# Patient Record
Sex: FEMALE | Race: WHITE | NOT HISPANIC OR LATINO | Employment: UNEMPLOYED | URBAN - METROPOLITAN AREA
[De-identification: names, ages, dates, MRNs, and addresses within clinical notes are randomized per-mention and may not be internally consistent; named-entity substitution may affect disease eponyms.]

---

## 2019-10-01 RX ORDER — LORATADINE 10 MG/1
TABLET ORAL
Qty: 30 TABLET | Refills: 0 | Status: SHIPPED | OUTPATIENT
Start: 2019-10-01 | End: 2019-12-30

## 2019-10-23 RX ORDER — OMEPRAZOLE 40 MG/1
CAPSULE, DELAYED RELEASE ORAL
Qty: 90 CAPSULE | Refills: 3 | Status: SHIPPED | OUTPATIENT
Start: 2019-10-23 | End: 2019-10-25

## 2019-10-24 ENCOUNTER — TELEPHONE (OUTPATIENT)
Dept: FAMILY MEDICINE CLINIC | Facility: CLINIC | Age: 54
End: 2019-10-24

## 2019-10-25 ENCOUNTER — OFFICE VISIT (OUTPATIENT)
Dept: FAMILY MEDICINE CLINIC | Facility: CLINIC | Age: 54
End: 2019-10-25

## 2019-10-25 VITALS
DIASTOLIC BLOOD PRESSURE: 68 MMHG | OXYGEN SATURATION: 100 % | HEIGHT: 66 IN | HEART RATE: 111 BPM | SYSTOLIC BLOOD PRESSURE: 126 MMHG | BODY MASS INDEX: 47.09 KG/M2 | TEMPERATURE: 97.8 F | WEIGHT: 293 LBS

## 2019-10-25 DIAGNOSIS — F43.23 ADJUSTMENT DISORDER WITH MIXED ANXIETY AND DEPRESSED MOOD: ICD-10-CM

## 2019-10-25 DIAGNOSIS — E11.9 TYPE 2 DIABETES MELLITUS WITHOUT COMPLICATION, WITHOUT LONG-TERM CURRENT USE OF INSULIN (HCC): ICD-10-CM

## 2019-10-25 DIAGNOSIS — Z23 NEEDS FLU SHOT: ICD-10-CM

## 2019-10-25 DIAGNOSIS — Z79.899 ENCOUNTER FOR LONG-TERM (CURRENT) USE OF MEDICATIONS: ICD-10-CM

## 2019-10-25 DIAGNOSIS — E78.2 MIXED HYPERLIPIDEMIA: ICD-10-CM

## 2019-10-25 DIAGNOSIS — K21.9 GASTROESOPHAGEAL REFLUX DISEASE WITHOUT ESOPHAGITIS: Primary | ICD-10-CM

## 2019-10-25 DIAGNOSIS — E66.01 CLASS 3 SEVERE OBESITY WITH SERIOUS COMORBIDITY AND BODY MASS INDEX (BMI) OF 45.0 TO 49.9 IN ADULT, UNSPECIFIED OBESITY TYPE (HCC): ICD-10-CM

## 2019-10-25 DIAGNOSIS — I10 ESSENTIAL HYPERTENSION: ICD-10-CM

## 2019-10-25 DIAGNOSIS — R01.1 MURMUR, CARDIAC: ICD-10-CM

## 2019-10-25 PROBLEM — E78.5 HYPERLIPIDEMIA: Status: ACTIVE | Noted: 2019-10-25

## 2019-10-25 PROCEDURE — 99214 OFFICE O/P EST MOD 30 MIN: CPT | Performed by: FAMILY MEDICINE

## 2019-10-25 PROCEDURE — 90686 IIV4 VACC NO PRSV 0.5 ML IM: CPT | Performed by: FAMILY MEDICINE

## 2019-10-25 PROCEDURE — 90471 IMMUNIZATION ADMIN: CPT | Performed by: FAMILY MEDICINE

## 2019-10-25 RX ORDER — BLOOD-GLUCOSE METER
1 KIT MISCELLANEOUS DAILY
Refills: 6 | COMMUNITY
Start: 2019-10-01 | End: 2020-06-08

## 2019-10-25 RX ORDER — METFORMIN HYDROCHLORIDE 500 MG/1
500 TABLET, EXTENDED RELEASE ORAL
Qty: 90 TABLET | Refills: 1 | Status: SHIPPED | OUTPATIENT
Start: 2019-10-25 | End: 2020-05-12

## 2019-10-25 RX ORDER — DOCUSATE SODIUM 100 MG/1
CAPSULE, LIQUID FILLED ORAL
Refills: 7 | COMMUNITY
Start: 2019-09-20 | End: 2020-01-28 | Stop reason: SDUPTHER

## 2019-10-25 RX ORDER — ATORVASTATIN CALCIUM 10 MG/1
10 TABLET, FILM COATED ORAL NIGHTLY
Qty: 90 TABLET | Refills: 1 | Status: SHIPPED | OUTPATIENT
Start: 2019-10-25 | End: 2020-05-12

## 2019-10-25 RX ORDER — ATORVASTATIN CALCIUM 10 MG/1
10 TABLET, FILM COATED ORAL NIGHTLY
Refills: 1 | COMMUNITY
Start: 2019-10-01 | End: 2019-10-25 | Stop reason: SDUPTHER

## 2019-10-25 RX ORDER — FERROUS SULFATE 325(65) MG
TABLET ORAL
Refills: 10 | COMMUNITY
Start: 2019-09-20 | End: 2019-10-29 | Stop reason: SDUPTHER

## 2019-10-25 RX ORDER — LISINOPRIL 40 MG/1
40 TABLET ORAL DAILY
Refills: 3 | COMMUNITY
Start: 2019-10-01 | End: 2019-10-25 | Stop reason: SDUPTHER

## 2019-10-25 RX ORDER — METFORMIN HYDROCHLORIDE 500 MG/1
TABLET, EXTENDED RELEASE ORAL
Refills: 2 | COMMUNITY
Start: 2019-10-11 | End: 2019-10-25 | Stop reason: SDUPTHER

## 2019-10-25 RX ORDER — PANTOPRAZOLE SODIUM 40 MG/1
40 TABLET, DELAYED RELEASE ORAL DAILY
Qty: 90 TABLET | Refills: 1 | Status: SHIPPED | OUTPATIENT
Start: 2019-10-25 | End: 2020-05-12 | Stop reason: SDUPTHER

## 2019-10-25 RX ORDER — LISINOPRIL 40 MG/1
40 TABLET ORAL DAILY
Qty: 90 TABLET | Refills: 1 | Status: SHIPPED | OUTPATIENT
Start: 2019-10-25 | End: 2020-06-10

## 2019-10-25 RX ORDER — SERTRALINE HYDROCHLORIDE 25 MG/1
TABLET, FILM COATED ORAL
Refills: 3 | COMMUNITY
Start: 2019-10-01 | End: 2019-10-25 | Stop reason: SDUPTHER

## 2019-10-25 NOTE — PROGRESS NOTES
Sreekanth Stapleton is a 54 y.o. female.     Chief Complaint   Patient presents with   • Heartburn     insurance will no longer cover omeprozole   • Hypertension   • Hyperlipidemia   • Diabetes       Patient is here to follow-up on her chronic health conditions:  GERD.  The patient has not been taking omeprazole the past couple of months because her insurance stopped paying for it.  She tried to take the over-the-counter medication but it was not as good as the omeprazole 40 mg I had her on.  She has a lot of gas on her stomach and belching and heartburn symptoms.  The symptoms occur daily.  Hypertension.  The patient is compliant with taking lisinopril 40 mg daily.  She denies any side effects.  She states that her blood pressures have been under good control.  Hyperlipidemia.  The patient is compliant with taking atorvastatin 10 mg daily and denies any side effects.  Type 2 diabetes.  The patient is compliant with taking metformin 500 mg once daily.  She denies any side effects and states that her blood sugars have been under good control.  Depression.  The patient currently takes sertraline 25 mg daily.  She originally was sleeping very well when she first started the medication however lately she has not been resting as well.  Her  seems to think that increasing the medication would help her rest better.  Patient agrees with this.    After hearing and murmur on exam I asked the patient if she had been told in the past that she has a murmur and she stated she had not.  She denies ever having a echo.         Review of Systems   Constitutional: Negative for activity change, chills, fatigue and fever.   HENT: Negative for hearing loss, swollen glands, tinnitus and trouble swallowing.    Eyes: Negative for pain and visual disturbance.   Respiratory: Negative for cough and shortness of breath.    Cardiovascular: Negative for chest pain, palpitations and leg swelling.   Gastrointestinal: Positive for  indigestion. Negative for diarrhea and nausea.   Endocrine: Negative for polydipsia and polyuria.   Genitourinary: Negative for difficulty urinating and urinary incontinence.   Musculoskeletal: Negative for arthralgias, gait problem and joint swelling.   Skin: Negative for rash.   Allergic/Immunologic: Negative for immunocompromised state.   Neurological: Negative for dizziness, light-headedness and headache.   Hematological: Negative for adenopathy. Does not bruise/bleed easily.   Psychiatric/Behavioral: Negative for dysphoric mood and sleep disturbance.       The following portions of the patient's history were reviewed and updated as appropriate: allergies, current medications, past family history, past medical history, past social history, past surgical history and problem list.    Past Medical History:   Diagnosis Date   • Adjustment disorder with mixed anxiety and depressed mood    • Allergic rhinitis    • Anemia    • Elevated LFTs    • Encounter for long-term (current) use of medications    • GERD (gastroesophageal reflux disease)    • Hyperlipidemia    • Hypertension    • Leukopenia    • Overweight    • Type II diabetes mellitus (CMS/[facility])        History reviewed. No pertinent surgical history.    Family History   Problem Relation Age of Onset   • Diabetes Other    • Hypertension Other        Social History     Socioeconomic History   • Marital status: Single     Spouse name: Not on file   • Number of children: Not on file   • Years of education: Not on file   • Highest education level: Not on file   Tobacco Use   • Smoking status: Never Smoker   Substance and Sexual Activity   • Alcohol use: No     Frequency: Never         Current Outpatient Medications:   •  atorvastatin (LIPITOR) 10 MG tablet, Take 10 mg by mouth Every Night., Disp: , Rfl: 1  •  docusate sodium (COLACE) 100 MG capsule, TAKE ONE CAPSULE BY MOUTH ONCE DAILY WHILE TAKING IRON SUPPLEMENT, Disp: , Rfl: 7  •  FEROSUL 325 (65 Fe) MG tablet, TAKE  "ONE TABLET BY MOUTH ONCE DAILY WITH FOOD FOR ANEMIA. TAKE WITH STOOL SOFTENER AS NEEDED, Disp: , Rfl: 10  •  FREESTYLE LITE test strip, 1 each by Other route Daily., Disp: , Rfl: 6  •  lisinopril (PRINIVIL,ZESTRIL) 40 MG tablet, Take 40 mg by mouth Daily., Disp: , Rfl: 3  •  loratadine (CLARITIN) 10 MG tablet, TAKE ONE TABLET BY MOUTH ONCE DAILY AS NEEDED FOR ALLERGIES, Disp: 30 tablet, Rfl: 0  •  metFORMIN ER (GLUCOPHAGE-XR) 500 MG 24 hr tablet, TAKE ONE TABLET BY MOUTH ONCE DAILY WITH EVENING MEAL, Disp: , Rfl: 2  •  sertraline (ZOLOFT) 25 MG tablet, TAKE 1 TABLET BY MOUTH EVERY DAY FOR MOOD, Disp: , Rfl: 3  •  pantoprazole (PROTONIX) 40 MG EC tablet, Take 1 tablet by mouth Daily., Disp: 90 tablet, Rfl: 1    Objective     Vitals:    10/25/19 1416   BP: 126/68   Pulse: 111   Temp: 97.8 °F (36.6 °C)   SpO2: 100%   Weight: 136 kg (300 lb)   Height: 167.6 cm (66\")       Body mass index is 48.42 kg/m².    No components found for: 2D    Physical Exam   Constitutional: She is oriented to person, place, and time. She appears well-developed and well-nourished. She is obese.  HENT:   Head: Normocephalic and atraumatic.   Eyes: Conjunctivae are normal.   Neck: Normal range of motion. Neck supple.   Cardiovascular: Normal rate, regular rhythm and intact distal pulses.   Murmur (2/6 RAMSES at LSB) heard.  Pulmonary/Chest: Effort normal and breath sounds normal.   Abdominal: Soft. Bowel sounds are normal.   Musculoskeletal: Normal range of motion. She exhibits no edema.   Neurological: She is alert and oriented to person, place, and time.   Skin: Skin is warm and dry. Capillary refill takes less than 2 seconds. No rash noted.   Psychiatric: She has a normal mood and affect. Her behavior is normal. Judgment and thought content normal.   Nursing note and vitals reviewed.      Procedures    Assessment/Plan   Sameera was seen today for heartburn, hypertension, hyperlipidemia and diabetes.    Diagnoses and all orders for this " visit:    Gastroesophageal reflux disease without esophagitis  -     pantoprazole (PROTONIX) 40 MG EC tablet; Take 1 tablet by mouth Daily.    Essential hypertension    Mixed hyperlipidemia  -     Lipid Panel    Type 2 diabetes mellitus without complication, without long-term current use of insulin (CMS/HCC)  -     Comprehensive Metabolic Panel  -     Hemoglobin A1c    Encounter for long-term (current) use of medications  -     pantoprazole (PROTONIX) 40 MG EC tablet; Take 1 tablet by mouth Daily.  -     CBC & Differential  -     Lipid Panel  -     Comprehensive Metabolic Panel  -     TSH    Murmur, cardiac  -     Adult Transthoracic Echo Complete W/ Cont if Necessary Per Protocol; Future    Class 3 severe obesity with serious comorbidity and body mass index (BMI) of 45.0 to 49.9 in adult, unspecified obesity type (CMS/HCC)    Needs flu shot  -     Fluarix/Fluzone/Afluria Quad>6 Months        Patient Instructions   We discussed the possible causes of heart murmurs and that most heart murmurs and reassurance was given.  I started the patient on a new medication for her reflux since her insurance will not cover omeprazole I have written for pantoprazole.  I have also increased the patient's Zoloft to 50 mg at nighttime for better rest.      Heart Murmur    A heart murmur is an extra sound that is caused by chaotic blood flow. The murmur can be heard as a “hum” or “whoosh” sound when blood flows through the heart.  The heart has four areas called chambers. Valves separate the upper and lower chambers from each other (tricuspid valve and mitral valve) and separate the lower chambers of the heart from pathways that lead away from the heart (aortic valve and pulmonary valve). Normally, the valves open to let blood flow through or out of your heart, and then they shut to keep the blood from flowing backward.  There are two types of heart murmurs:  · Innocent murmurs. Most people with this type of heart murmur do not have a  heart problem. Many children have innocent heart murmurs. Your health care provider may suggest some basic testing to find out whether your murmur is an innocent murmur. If an innocent heart murmur is found, there is no need for further tests or treatment and no need to restrict activities or stop playing sports.  · Abnormal murmurs. These types of murmurs can occur in children and adults. Abnormal murmurs may be a sign of a more serious heart condition, such as a heart defect present at birth (congenital defect) or heart valve disease.  What are the causes?  This condition is caused by heart valves that are not working properly. In children, abnormal heart murmurs are typically caused by congenital defects. In adults, abnormal murmurs are usually from heart valve problems caused by disease, infection, or aging. Three types of heart valve defects can cause a murmur:  · Regurgitation. This is when blood leaks back through the valve in the wrong direction.  · Mitral valve prolapse. This is when the mitral valve of the heart has a loose flap and does not close tightly.  · Stenosis. This is when a valve does not open enough and blocks blood flow.  This condition may also be caused by:  · Pregnancy.  · Fever.  · Overactive thyroid gland.  · Anemia.  · Exercise.  · Rapid growth spurts (in children).  What are the signs or symptoms?  Innocent murmurs do not cause symptoms, and many people with abnormal murmurs may or may not have symptoms. If symptoms do develop, they may include:  · Shortness of breath.  · Blue coloring of the skin, especially on the fingertips.  · Chest pain.  · Palpitations, or feeling a fluttering or skipped heartbeat.  · Fainting.  · Persistent cough.  · Getting tired much faster than expected.  · Swelling in the abdomen, feet, or ankles.  How is this diagnosed?  This condition may be diagnosed during a routine physical or other exam. If your health care provider hears a murmur with a stethoscope, he  or she will listen for:  · Where the murmur is located in your heart.  · How long the murmur lasts (duration).  · When the murmur is heard during the heartbeat.  · How loud the murmur is. This may help the health care provider figure out what is causing the murmur.  You may be referred to a heart specialist (cardiologist). You may also have other tests, including:  · Electrocardiogram (ECG or EKG). This test measures the electrical activity of your heart.  · Echocardiogram. This test uses high frequency sound waves to make pictures of your heart.  · MRI or chest X-ray.  · Cardiac catheterization. This test looks at blood flow through the heart.  For children and adults who have an abnormal heart murmur and want to stay active, it is important to complete testing, review test results, and receive recommendations from your health care provider. If heart disease is present, it may not be safe to play or be active.  How is this treated?  Heart murmurs themselves do not need treatment. In some cases, a heart murmur may go away on its own. If an underlying problem or disease is causing the murmur, you may need treatment. If treatment is needed, it will depend on the type and severity of the disease or heart problem causing the murmur. Treatment may include:  · Medicine.  · Surgery.  · Dietary and lifestyle changes.  Follow these instructions at home:  · Talk with your health care provider before participating in sports or other activities that require a lot of effort and energy (are strenuous).  · Learn as much as possible about your condition and any related diseases. Ask your health care provider if you may at risk for any medical emergencies.  · Talk with your health care provider about what symptoms you should look out for.  · It is up to you to get your test results. Ask your health care provider, or the department that is doing the test, when your results will be ready.  · Keep all follow-up visits as told by your  health care provider. This is important.  Contact a health care provider if:  · You feel light-headed.  · You are frequently short of breath.  · You feel more tired than usual.  · You are having a hard time keeping up with normal activities or fitness routines.  · You have swelling in your ankles or feet.  · You have chest pain.  · You notice that your heart often beats irregularly.  · You develop any new symptoms.  Get help right away if:  · You develop severe chest pain.  · You are having trouble breathing.  · You have fainting spells.  · Your symptoms suddenly get worse.  These symptoms may represent a serious problem that is an emergency. Do not wait to see if the symptoms will go away. Get medical help right away. Call your local emergency services (911 in the [place] Do not drive yourself to the hospital.  Summary  · Normally, the heart valves open to let blood flow through or out of your heart, and then they shut to keep the blood from flowing backward.  · Heart murmur is caused by heart valves that are not working properly.  · You may need treatment if an underlying problem or disease is causing the heart murmur. Treatment may include medicine, surgery, or dietary and lifestyle changes.  · Talk with your health care provider before participating in sports or other activities that require a lot of effort and energy (are strenuous).  · Talk with your health care provider about what symptoms you should watch out for.  This information is not intended to replace advice given to you by your health care provider. Make sure you discuss any questions you have with your health care provider.  Document Released: 01/25/2006 Document Revised: 12/06/2017 Document Reviewed: 12/06/2017  [employer] Interactive Patient Education © 2019 [employer]

## 2019-10-25 NOTE — PATIENT INSTRUCTIONS
We discussed the possible causes of heart murmurs and that most heart murmurs and reassurance was given.  I started the patient on a new medication for her reflux since her insurance will not cover omeprazole I have written for pantoprazole.  I have also increased the patient's Zoloft to 50 mg at nighttime for better rest.      Heart Murmur    A heart murmur is an extra sound that is caused by chaotic blood flow. The murmur can be heard as a “hum” or “whoosh” sound when blood flows through the heart.  The heart has four areas called chambers. Valves separate the upper and lower chambers from each other (tricuspid valve and mitral valve) and separate the lower chambers of the heart from pathways that lead away from the heart (aortic valve and pulmonary valve). Normally, the valves open to let blood flow through or out of your heart, and then they shut to keep the blood from flowing backward.  There are two types of heart murmurs:  · Innocent murmurs. Most people with this type of heart murmur do not have a heart problem. Many children have innocent heart murmurs. Your health care provider may suggest some basic testing to find out whether your murmur is an innocent murmur. If an innocent heart murmur is found, there is no need for further tests or treatment and no need to restrict activities or stop playing sports.  · Abnormal murmurs. These types of murmurs can occur in children and adults. Abnormal murmurs may be a sign of a more serious heart condition, such as a heart defect present at birth (congenital defect) or heart valve disease.  What are the causes?  This condition is caused by heart valves that are not working properly. In children, abnormal heart murmurs are typically caused by congenital defects. In adults, abnormal murmurs are usually from heart valve problems caused by disease, infection, or aging. Three types of heart valve defects can cause a murmur:  · Regurgitation. This is when blood leaks back  through the valve in the wrong direction.  · Mitral valve prolapse. This is when the mitral valve of the heart has a loose flap and does not close tightly.  · Stenosis. This is when a valve does not open enough and blocks blood flow.  This condition may also be caused by:  · Pregnancy.  · Fever.  · Overactive thyroid gland.  · Anemia.  · Exercise.  · Rapid growth spurts (in children).  What are the signs or symptoms?  Innocent murmurs do not cause symptoms, and many people with abnormal murmurs may or may not have symptoms. If symptoms do develop, they may include:  · Shortness of breath.  · Blue coloring of the skin, especially on the fingertips.  · Chest pain.  · Palpitations, or feeling a fluttering or skipped heartbeat.  · Fainting.  · Persistent cough.  · Getting tired much faster than expected.  · Swelling in the abdomen, feet, or ankles.  How is this diagnosed?  This condition may be diagnosed during a routine physical or other exam. If your health care provider hears a murmur with a stethoscope, he or she will listen for:  · Where the murmur is located in your heart.  · How long the murmur lasts (duration).  · When the murmur is heard during the heartbeat.  · How loud the murmur is. This may help the health care provider figure out what is causing the murmur.  You may be referred to a heart specialist (cardiologist). You may also have other tests, including:  · Electrocardiogram (ECG or EKG). This test measures the electrical activity of your heart.  · Echocardiogram. This test uses high frequency sound waves to make pictures of your heart.  · MRI or chest X-ray.  · Cardiac catheterization. This test looks at blood flow through the heart.  For children and adults who have an abnormal heart murmur and want to stay active, it is important to complete testing, review test results, and receive recommendations from your health care provider. If heart disease is present, it may not be safe to play or be  active.  How is this treated?  Heart murmurs themselves do not need treatment. In some cases, a heart murmur may go away on its own. If an underlying problem or disease is causing the murmur, you may need treatment. If treatment is needed, it will depend on the type and severity of the disease or heart problem causing the murmur. Treatment may include:  · Medicine.  · Surgery.  · Dietary and lifestyle changes.  Follow these instructions at home:  · Talk with your health care provider before participating in sports or other activities that require a lot of effort and energy (are strenuous).  · Learn as much as possible about your condition and any related diseases. Ask your health care provider if you may at risk for any medical emergencies.  · Talk with your health care provider about what symptoms you should look out for.  · It is up to you to get your test results. Ask your health care provider, or the department that is doing the test, when your results will be ready.  · Keep all follow-up visits as told by your health care provider. This is important.  Contact a health care provider if:  · You feel light-headed.  · You are frequently short of breath.  · You feel more tired than usual.  · You are having a hard time keeping up with normal activities or fitness routines.  · You have swelling in your ankles or feet.  · You have chest pain.  · You notice that your heart often beats irregularly.  · You develop any new symptoms.  Get help right away if:  · You develop severe chest pain.  · You are having trouble breathing.  · You have fainting spells.  · Your symptoms suddenly get worse.  These symptoms may represent a serious problem that is an emergency. Do not wait to see if the symptoms will go away. Get medical help right away. Call your local emergency services (911 in the [place] Do not drive yourself to the hospital.  Summary  · Normally, the heart valves open to let blood flow through or out of your heart, and  then they shut to keep the blood from flowing backward.  · Heart murmur is caused by heart valves that are not working properly.  · You may need treatment if an underlying problem or disease is causing the heart murmur. Treatment may include medicine, surgery, or dietary and lifestyle changes.  · Talk with your health care provider before participating in sports or other activities that require a lot of effort and energy (are strenuous).  · Talk with your health care provider about what symptoms you should watch out for.  This information is not intended to replace advice given to you by your health care provider. Make sure you discuss any questions you have with your health care provider.  Document Released: 01/25/2006 Document Revised: 12/06/2017 Document Reviewed: 12/06/2017  [employer] Interactive Patient Education © 2019 [employer]

## 2019-10-26 LAB
ALBUMIN SERPL-MCNC: 3 G/DL (ref 3.5–5.5)
ALBUMIN/GLOB SERPL: 0.8 {RATIO} (ref 1.2–2.2)
ALP SERPL-CCNC: 107 IU/L (ref 39–117)
ALT SERPL-CCNC: 29 IU/L (ref 0–32)
AST SERPL-CCNC: 70 IU/L (ref 0–40)
BASOPHILS # BLD AUTO: 0 X10E3/UL (ref 0–0.2)
BASOPHILS NFR BLD AUTO: 1 %
BILIRUB SERPL-MCNC: 1 MG/DL (ref 0–1.2)
BUN SERPL-MCNC: 12 MG/DL (ref 6–24)
BUN/CREAT SERPL: 14 (ref 9–23)
CALCIUM SERPL-MCNC: 8.8 MG/DL (ref 8.7–10.2)
CHLORIDE SERPL-SCNC: 109 MMOL/L (ref 96–106)
CHOLEST SERPL-MCNC: 138 MG/DL (ref 100–199)
CO2 SERPL-SCNC: 23 MMOL/L (ref 20–29)
CREAT SERPL-MCNC: 0.87 MG/DL (ref 0.57–1)
EOSINOPHIL # BLD AUTO: 0.2 X10E3/UL (ref 0–0.4)
EOSINOPHIL NFR BLD AUTO: 4 %
ERYTHROCYTE [DISTWIDTH] IN BLOOD BY AUTOMATED COUNT: 15.5 % (ref 12.3–15.4)
GLOBULIN SER CALC-MCNC: 3.9 G/DL (ref 1.5–4.5)
GLUCOSE SERPL-MCNC: 92 MG/DL (ref 65–99)
HBA1C MFR BLD: 5.5 % (ref 4.8–5.6)
HCT VFR BLD AUTO: 33.7 % (ref 34–46.6)
HDLC SERPL-MCNC: 55 MG/DL
HGB BLD-MCNC: 11 G/DL (ref 11.1–15.9)
IMM GRANULOCYTES # BLD AUTO: 0 X10E3/UL (ref 0–0.1)
IMM GRANULOCYTES NFR BLD AUTO: 0 %
LDLC SERPL CALC-MCNC: 67 MG/DL (ref 0–99)
LYMPHOCYTES # BLD AUTO: 1.1 X10E3/UL (ref 0.7–3.1)
LYMPHOCYTES NFR BLD AUTO: 25 %
MCH RBC QN AUTO: 33.5 PG (ref 26.6–33)
MCHC RBC AUTO-ENTMCNC: 32.6 G/DL (ref 31.5–35.7)
MCV RBC AUTO: 103 FL (ref 79–97)
MONOCYTES # BLD AUTO: 0.4 X10E3/UL (ref 0.1–0.9)
MONOCYTES NFR BLD AUTO: 8 %
NEUTROPHILS # BLD AUTO: 2.7 X10E3/UL (ref 1.4–7)
NEUTROPHILS NFR BLD AUTO: 62 %
PLATELET # BLD AUTO: 129 X10E3/UL (ref 150–450)
POTASSIUM SERPL-SCNC: 4.1 MMOL/L (ref 3.5–5.2)
PROT SERPL-MCNC: 6.9 G/DL (ref 6–8.5)
RBC # BLD AUTO: 3.28 X10E6/UL (ref 3.77–5.28)
SODIUM SERPL-SCNC: 143 MMOL/L (ref 134–144)
TRIGL SERPL-MCNC: 80 MG/DL (ref 0–149)
TSH SERPL DL<=0.005 MIU/L-ACNC: 1.79 UIU/ML (ref 0.45–4.5)
VLDLC SERPL CALC-MCNC: 16 MG/DL (ref 5–40)
WBC # BLD AUTO: 4.4 X10E3/UL (ref 3.4–10.8)

## 2019-10-29 RX ORDER — FERROUS SULFATE 325(65) MG
TABLET ORAL
Qty: 30 TABLET | Refills: 10 | Status: SHIPPED | OUTPATIENT
Start: 2019-10-29 | End: 2020-11-20

## 2019-11-26 DIAGNOSIS — R01.1 MURMUR, CARDIAC: ICD-10-CM

## 2019-12-30 RX ORDER — LORATADINE 10 MG/1
TABLET ORAL
Qty: 30 TABLET | Refills: 0 | Status: SHIPPED | OUTPATIENT
Start: 2019-12-30 | End: 2020-01-27 | Stop reason: SDUPTHER

## 2020-01-27 RX ORDER — LORATADINE 10 MG/1
TABLET ORAL
Qty: 30 TABLET | Refills: 0 | Status: SHIPPED | OUTPATIENT
Start: 2020-01-27 | End: 2020-03-24

## 2020-01-28 ENCOUNTER — OFFICE VISIT (OUTPATIENT)
Dept: FAMILY MEDICINE CLINIC | Facility: CLINIC | Age: 55
End: 2020-01-28

## 2020-01-28 DIAGNOSIS — Z79.899 ENCOUNTER FOR LONG-TERM (CURRENT) USE OF MEDICATIONS: ICD-10-CM

## 2020-01-28 DIAGNOSIS — E66.01 CLASS 3 SEVERE OBESITY WITH SERIOUS COMORBIDITY AND BODY MASS INDEX (BMI) OF 45.0 TO 49.9 IN ADULT, UNSPECIFIED OBESITY TYPE (HCC): ICD-10-CM

## 2020-01-28 DIAGNOSIS — R01.0 BENIGN AND INNOCENT CARDIAC MURMURS: ICD-10-CM

## 2020-01-28 DIAGNOSIS — K21.9 GASTROESOPHAGEAL REFLUX DISEASE WITHOUT ESOPHAGITIS: ICD-10-CM

## 2020-01-28 DIAGNOSIS — E11.9 TYPE 2 DIABETES MELLITUS WITHOUT COMPLICATION, WITHOUT LONG-TERM CURRENT USE OF INSULIN (HCC): Primary | ICD-10-CM

## 2020-01-28 DIAGNOSIS — F43.23 ADJUSTMENT DISORDER WITH MIXED ANXIETY AND DEPRESSED MOOD: ICD-10-CM

## 2020-01-28 DIAGNOSIS — E78.2 MIXED HYPERLIPIDEMIA: ICD-10-CM

## 2020-01-28 DIAGNOSIS — I10 ESSENTIAL HYPERTENSION: ICD-10-CM

## 2020-01-28 LAB
GLUCOSE BLDC GLUCOMTR-MCNC: 144 MG/DL (ref 70–130)
HBA1C MFR BLD: 5.9 %

## 2020-01-28 PROCEDURE — 82962 GLUCOSE BLOOD TEST: CPT | Performed by: FAMILY MEDICINE

## 2020-01-28 PROCEDURE — 99214 OFFICE O/P EST MOD 30 MIN: CPT | Performed by: FAMILY MEDICINE

## 2020-01-28 PROCEDURE — 83036 HEMOGLOBIN GLYCOSYLATED A1C: CPT | Performed by: FAMILY MEDICINE

## 2020-01-28 RX ORDER — LANCETS 28 GAUGE
EACH MISCELLANEOUS
COMMUNITY
Start: 2019-12-17 | End: 2020-08-07

## 2020-01-28 RX ORDER — DOCUSATE SODIUM 100 MG/1
100 CAPSULE, LIQUID FILLED ORAL DAILY
Qty: 90 CAPSULE | Refills: 3 | Status: SHIPPED | OUTPATIENT
Start: 2020-01-28 | End: 2020-09-23

## 2020-01-28 RX ORDER — FLUOXETINE HYDROCHLORIDE 20 MG/1
20 CAPSULE ORAL DAILY
Qty: 90 CAPSULE | Refills: 0 | Status: SHIPPED | OUTPATIENT
Start: 2020-01-28 | End: 2021-01-01

## 2020-01-28 NOTE — PROGRESS NOTES
Sreekanth Stapleton is a 54 y.o. female.     Chief Complaint   Patient presents with   • Hypertension   • Hyperlipidemia   • Diabetes   • Anxiety   • Depression       Patient is here to follow-up on her chronic health conditions:  GERD.  The patient has not been taking omeprazole the past couple of months because her insurance stopped paying for it.  She tried to take the over-the-counter medication but it was not as good as the omeprazole 40 mg I had her on.  She has a lot of gas on her stomach and belching and heartburn symptoms.  The symptoms occur daily.  Hypertension.  The patient is compliant with taking lisinopril 40 mg daily.  She denies any side effects.  She states that her blood pressures have been under good control.  Hyperlipidemia.  The patient is compliant with taking atorvastatin 10 mg daily and denies any side effects.  Type 2 diabetes.  The patient is compliant with taking metformin 500 mg once daily.  She denies any side effects and states that her blood sugars have been under good control.  Depression.  The patient currently takes sertraline 50 mg daily.  We increased it at the last visit to help her sleep but it is not helping.  She also does not feel that is helping with her mood.       Review of Systems   Constitutional: Negative for activity change, chills, fatigue and fever.   HENT: Negative for hearing loss, swollen glands, tinnitus and trouble swallowing.    Eyes: Negative for pain and visual disturbance.   Respiratory: Negative for cough and shortness of breath.    Cardiovascular: Negative for chest pain, palpitations and leg swelling.   Gastrointestinal: Negative for diarrhea and nausea.   Endocrine: Negative for polydipsia and polyuria.   Genitourinary: Negative for difficulty urinating and urinary incontinence.   Musculoskeletal: Negative for arthralgias, gait problem and joint swelling.   Skin: Negative for rash.   Allergic/Immunologic: Negative for immunocompromised state.    Neurological: Negative for dizziness, light-headedness and headache.   Hematological: Negative for adenopathy. Does not bruise/bleed easily.   Psychiatric/Behavioral: Negative for dysphoric mood and sleep disturbance.       The following portions of the patient's history were reviewed and updated as appropriate: allergies, current medications, past family history, past medical history, past social history, past surgical history and problem list.    Past Medical History:   Diagnosis Date   • Adjustment disorder with mixed anxiety and depressed mood    • Allergic rhinitis    • Anemia    • Elevated LFTs    • Encounter for long-term (current) use of medications    • GERD (gastroesophageal reflux disease)    • Hyperlipidemia    • Hypertension    • Leukopenia    • Overweight    • Type II diabetes mellitus (CMS/[facility])        History reviewed. No pertinent surgical history.    Family History   Problem Relation Age of Onset   • Diabetes Other    • Hypertension Other        Social History     Socioeconomic History   • Marital status: Single     Spouse name: Not on file   • Number of children: Not on file   • Years of education: Not on file   • Highest education level: Not on file   Tobacco Use   • Smoking status: Never Smoker   Substance and Sexual Activity   • Alcohol use: No     Frequency: Never         Current Outpatient Medications:   •  atorvastatin (LIPITOR) 10 MG tablet, Take 1 tablet by mouth Every Night., Disp: 90 tablet, Rfl: 1  •  docusate sodium (COLACE) 100 MG capsule, Take 1 capsule by mouth Daily., Disp: 90 capsule, Rfl: 3  •  FEROSUL 325 (65 Fe) MG tablet, TAKE ONE TABLET BY MOUTH ONCE DAILY WITH FOOD FOR ANEMIA. TAKE WITH STOOL SOFTENER AS NEEDED, Disp: 30 tablet, Rfl: 10  •  FREESTYLE LITE test strip, 1 each by Other route Daily., Disp: , Rfl: 6  •  lisinopril (PRINIVIL,ZESTRIL) 40 MG tablet, Take 1 tablet by mouth Daily., Disp: 90 tablet, Rfl: 1  •  loratadine (CLARITIN) 10 MG tablet, TAKE 1 TABLET BY MOUTH  EVERY DAY AS NEEDED FOR ALLERGIES, Disp: 30 tablet, Rfl: 0  •  metFORMIN ER (GLUCOPHAGE-XR) 500 MG 24 hr tablet, Take 1 tablet by mouth Daily With Breakfast., Disp: 90 tablet, Rfl: 1  •  pantoprazole (PROTONIX) 40 MG EC tablet, Take 1 tablet by mouth Daily., Disp: 90 tablet, Rfl: 1  •  FLUoxetine (PROzac) 20 MG capsule, Take 1 capsule by mouth Daily., Disp: 90 capsule, Rfl: 0  •  Lancets (FREESTYLE) lancets, , Disp: , Rfl:     Objective     There were no vitals filed for this visit.    There is no height or weight on file to calculate BMI.    No components found for: 2D    Physical Exam   Constitutional: She is oriented to person, place, and time. She appears well-developed and well-nourished.   HENT:   Head: Normocephalic and atraumatic.   Eyes: Conjunctivae are normal.   Neck: Normal range of motion. Neck supple.   Cardiovascular: Normal rate, regular rhythm, normal heart sounds and intact distal pulses.   Pulmonary/Chest: Effort normal and breath sounds normal.   Abdominal: Soft. Bowel sounds are normal.   Musculoskeletal: Normal range of motion. She exhibits no edema.   Neurological: She is alert and oriented to person, place, and time.   Skin: Skin is warm and dry. Capillary refill takes less than 2 seconds. No rash noted.   Psychiatric: She has a normal mood and affect. Her behavior is normal. Judgment and thought content normal.   Nursing note and vitals reviewed.      Procedures    Assessment/Plan   Sameera was seen today for hypertension, hyperlipidemia, diabetes, anxiety and depression.    Diagnoses and all orders for this visit:    Type 2 diabetes mellitus without complication, without long-term current use of insulin (CMS/[facility])  -     POCT Glucose  -     POC Glycosylated Hemoglobin (Hb A1C)  -     Comprehensive Metabolic Panel    Benign and innocent cardiac murmurs  -     Comprehensive Metabolic Panel    Essential hypertension    Mixed hyperlipidemia  -     Comprehensive Metabolic Panel    Gastroesophageal  reflux disease without esophagitis  -     CBC & Differential    Adjustment disorder with mixed anxiety and depressed mood  -     FLUoxetine (PROzac) 20 MG capsule; Take 1 capsule by mouth Daily.    Encounter for long-term (current) use of medications  -     CBC & Differential  -     Comprehensive Metabolic Panel  -     Amylase  -     Lipase    Class 3 severe obesity with serious comorbidity and body mass index (BMI) of 45.0 to 49.9 in adult, unspecified obesity type (CMS/HCC)    Other orders  -     docusate sodium (COLACE) 100 MG capsule; Take 1 capsule by mouth Daily.        Patient Instructions   Surveillance labs were obtained today and any medication changes will be made based on lab results and will be called to the patient later this week.    For better control of the patient's depression I have changed her medication from sertraline to fluoxetine today.      Fatty Liver Disease    Fatty liver disease occurs when too much fat has built up in your liver cells. Fatty liver disease is also called hepatic steatosis or steatohepatitis. The liver removes harmful substances from your bloodstream and produces fluids that your body needs. It also helps your body use and store energy from the food you eat.  In many cases, fatty liver disease does not cause symptoms or problems. It is often diagnosed when tests are being done for other reasons. However, over time, fatty liver can cause inflammation that may lead to more serious liver problems, such as scarring of the liver (cirrhosis) and liver failure.  Fatty liver is associated with insulin resistance, increased body fat, high blood pressure (hypertension), and high cholesterol. These are features of metabolic syndrome and increase your risk for stroke, diabetes, and heart disease.  What are the causes?  This condition may be caused by:  · Drinking too much alcohol.  · Poor nutrition.  · Obesity.  · Cushing's syndrome.  · Diabetes.  · High cholesterol.  · Certain  drugs.  · Poisons.  · Some viral infections.  · Pregnancy.  What increases the risk?  You are more likely to develop this condition if you:  · Abuse alcohol.  · Are overweight.  · Have diabetes.  · Have hepatitis.  · Have a high triglyceride level.  · Are pregnant.  What are the signs or symptoms?  Fatty liver disease often does not cause symptoms. If symptoms do develop, they can include:  · Fatigue.  · Weakness.  · Weight loss.  · Confusion.  · Abdominal pain.  · Nausea and vomiting.  · Yellowing of your skin and the white parts of your eyes (jaundice).  · Itchy skin.  How is this diagnosed?  This condition may be diagnosed by:  · A physical exam and medical history.  · Blood tests.  · Imaging tests, such as an ultrasound, CT scan, or MRI.  · A liver biopsy. A small sample of liver tissue is removed using a needle. The sample is then looked at under a microscope.  How is this treated?  Fatty liver disease is often caused by other health conditions. Treatment for fatty liver may involve medicines and lifestyle changes to manage conditions such as:  · Alcoholism.  · High cholesterol.  · Diabetes.  · Being overweight or obese.  Follow these instructions at home:    · Do not drink alcohol. If you have trouble quitting, ask your health care provider how to safely quit with the help of medicine or a supervised program. This is important to keep your condition from getting worse.  · Eat a healthy diet as told by your health care provider. Ask your health care provider about working with a diet and nutrition specialist (dietitian) to develop an eating plan.  · Exercise regularly. This can help you lose weight and control your cholesterol and diabetes. Talk to your health care provider about an exercise plan and which activities are best for you.  · Take over-the-counter and prescription medicines only as told by your health care provider.  · Keep all follow-up visits as told by your health care provider. This is  important.  Contact a health care provider if:  You have trouble controlling your:  · Blood sugar. This is especially important if you have diabetes.  · Cholesterol.  · Drinking of alcohol.  Get help right away if:  · You have abdominal pain.  · You have jaundice.  · You have nausea and vomiting.  · You vomit blood or material that looks like coffee grounds.  · You have stools that are black, tar-like, or bloody.  Summary  · Fatty liver disease develops when too much fat builds up in the cells of your liver.  · Fatty liver disease often causes no symptoms or problems. However, over time, fatty liver can cause inflammation that may lead to more serious liver problems, such as scarring of the liver (cirrhosis).  · You are more likely to develop this condition if you abuse alcohol, are pregnant, are overweight, have diabetes, have hepatitis, or have high triglyceride levels.  · Contact your health care provider if you have trouble controlling your weight, blood sugar, cholesterol, or drinking of alcohol.  This information is not intended to replace advice given to you by your health care provider. Make sure you discuss any questions you have with your health care provider.  Document Released: 02/02/2007 Document Revised: 09/26/2018 Document Reviewed: 09/26/2018  [employer] Patient Education © 2019 [employer]

## 2020-01-28 NOTE — PATIENT INSTRUCTIONS
Surveillance labs were obtained today and any medication changes will be made based on lab results and will be called to the patient later this week.    For better control of the patient's depression I have changed her medication from sertraline to fluoxetine today.      Fatty Liver Disease    Fatty liver disease occurs when too much fat has built up in your liver cells. Fatty liver disease is also called hepatic steatosis or steatohepatitis. The liver removes harmful substances from your bloodstream and produces fluids that your body needs. It also helps your body use and store energy from the food you eat.  In many cases, fatty liver disease does not cause symptoms or problems. It is often diagnosed when tests are being done for other reasons. However, over time, fatty liver can cause inflammation that may lead to more serious liver problems, such as scarring of the liver (cirrhosis) and liver failure.  Fatty liver is associated with insulin resistance, increased body fat, high blood pressure (hypertension), and high cholesterol. These are features of metabolic syndrome and increase your risk for stroke, diabetes, and heart disease.  What are the causes?  This condition may be caused by:  · Drinking too much alcohol.  · Poor nutrition.  · Obesity.  · Cushing's syndrome.  · Diabetes.  · High cholesterol.  · Certain drugs.  · Poisons.  · Some viral infections.  · Pregnancy.  What increases the risk?  You are more likely to develop this condition if you:  · Abuse alcohol.  · Are overweight.  · Have diabetes.  · Have hepatitis.  · Have a high triglyceride level.  · Are pregnant.  What are the signs or symptoms?  Fatty liver disease often does not cause symptoms. If symptoms do develop, they can include:  · Fatigue.  · Weakness.  · Weight loss.  · Confusion.  · Abdominal pain.  · Nausea and vomiting.  · Yellowing of your skin and the white parts of your eyes (jaundice).  · Itchy skin.  How is this diagnosed?  This  condition may be diagnosed by:  · A physical exam and medical history.  · Blood tests.  · Imaging tests, such as an ultrasound, CT scan, or MRI.  · A liver biopsy. A small sample of liver tissue is removed using a needle. The sample is then looked at under a microscope.  How is this treated?  Fatty liver disease is often caused by other health conditions. Treatment for fatty liver may involve medicines and lifestyle changes to manage conditions such as:  · Alcoholism.  · High cholesterol.  · Diabetes.  · Being overweight or obese.  Follow these instructions at home:    · Do not drink alcohol. If you have trouble quitting, ask your health care provider how to safely quit with the help of medicine or a supervised program. This is important to keep your condition from getting worse.  · Eat a healthy diet as told by your health care provider. Ask your health care provider about working with a diet and nutrition specialist (dietitian) to develop an eating plan.  · Exercise regularly. This can help you lose weight and control your cholesterol and diabetes. Talk to your health care provider about an exercise plan and which activities are best for you.  · Take over-the-counter and prescription medicines only as told by your health care provider.  · Keep all follow-up visits as told by your health care provider. This is important.  Contact a health care provider if:  You have trouble controlling your:  · Blood sugar. This is especially important if you have diabetes.  · Cholesterol.  · Drinking of alcohol.  Get help right away if:  · You have abdominal pain.  · You have jaundice.  · You have nausea and vomiting.  · You vomit blood or material that looks like coffee grounds.  · You have stools that are black, tar-like, or bloody.  Summary  · Fatty liver disease develops when too much fat builds up in the cells of your liver.  · Fatty liver disease often causes no symptoms or problems. However, over time, fatty liver can cause  inflammation that may lead to more serious liver problems, such as scarring of the liver (cirrhosis).  · You are more likely to develop this condition if you abuse alcohol, are pregnant, are overweight, have diabetes, have hepatitis, or have high triglyceride levels.  · Contact your health care provider if you have trouble controlling your weight, blood sugar, cholesterol, or drinking of alcohol.  This information is not intended to replace advice given to you by your health care provider. Make sure you discuss any questions you have with your health care provider.  Document Released: 02/02/2007 Document Revised: 09/26/2018 Document Reviewed: 09/26/2018  [employer] Patient Education © 2019 [employer]

## 2020-01-29 LAB
ALBUMIN SERPL-MCNC: 3.1 G/DL (ref 3.8–4.9)
ALBUMIN/GLOB SERPL: 0.8 {RATIO} (ref 1.2–2.2)
ALP SERPL-CCNC: 92 IU/L (ref 39–117)
ALT SERPL-CCNC: 24 IU/L (ref 0–32)
AMYLASE SERPL-CCNC: 77 U/L (ref 31–110)
AST SERPL-CCNC: 52 IU/L (ref 0–40)
BASOPHILS # BLD AUTO: 0 X10E3/UL (ref 0–0.2)
BASOPHILS NFR BLD AUTO: 1 %
BILIRUB SERPL-MCNC: 1 MG/DL (ref 0–1.2)
BUN SERPL-MCNC: 14 MG/DL (ref 6–24)
BUN/CREAT SERPL: 13 (ref 9–23)
CALCIUM SERPL-MCNC: 8.7 MG/DL (ref 8.7–10.2)
CHLORIDE SERPL-SCNC: 104 MMOL/L (ref 96–106)
CO2 SERPL-SCNC: 23 MMOL/L (ref 20–29)
CREAT SERPL-MCNC: 1.11 MG/DL (ref 0.57–1)
EOSINOPHIL # BLD AUTO: 0.1 X10E3/UL (ref 0–0.4)
EOSINOPHIL NFR BLD AUTO: 3 %
ERYTHROCYTE [DISTWIDTH] IN BLOOD BY AUTOMATED COUNT: 13.5 % (ref 11.7–15.4)
GLOBULIN SER CALC-MCNC: 3.9 G/DL (ref 1.5–4.5)
GLUCOSE SERPL-MCNC: 117 MG/DL (ref 65–99)
HCT VFR BLD AUTO: 32.2 % (ref 34–46.6)
HGB BLD-MCNC: 10.7 G/DL (ref 11.1–15.9)
IMM GRANULOCYTES # BLD AUTO: 0 X10E3/UL (ref 0–0.1)
IMM GRANULOCYTES NFR BLD AUTO: 0 %
LIPASE SERPL-CCNC: 70 U/L (ref 14–72)
LYMPHOCYTES # BLD AUTO: 1.3 X10E3/UL (ref 0.7–3.1)
LYMPHOCYTES NFR BLD AUTO: 31 %
MCH RBC QN AUTO: 32.3 PG (ref 26.6–33)
MCHC RBC AUTO-ENTMCNC: 33.2 G/DL (ref 31.5–35.7)
MCV RBC AUTO: 97 FL (ref 79–97)
MONOCYTES # BLD AUTO: 0.7 X10E3/UL (ref 0.1–0.9)
MONOCYTES NFR BLD AUTO: 16 %
NEUTROPHILS # BLD AUTO: 2.1 X10E3/UL (ref 1.4–7)
NEUTROPHILS NFR BLD AUTO: 49 %
PLATELET # BLD AUTO: 132 X10E3/UL (ref 150–450)
POTASSIUM SERPL-SCNC: 3.9 MMOL/L (ref 3.5–5.2)
PROT SERPL-MCNC: 7 G/DL (ref 6–8.5)
RBC # BLD AUTO: 3.31 X10E6/UL (ref 3.77–5.28)
SODIUM SERPL-SCNC: 140 MMOL/L (ref 134–144)
WBC # BLD AUTO: 4.2 X10E3/UL (ref 3.4–10.8)

## 2020-03-24 RX ORDER — LORATADINE 10 MG/1
TABLET ORAL
Qty: 90 TABLET | Refills: 2 | Status: SHIPPED | OUTPATIENT
Start: 2020-03-24 | End: 2020-11-20

## 2020-04-17 ENCOUNTER — TELEPHONE (OUTPATIENT)
Dept: FAMILY MEDICINE CLINIC | Facility: CLINIC | Age: 55
End: 2020-04-17

## 2020-04-17 NOTE — TELEPHONE ENCOUNTER
PLEASE ADVISE IF SHE WILL NEED TO DO A VISIT BEFORE FILLING THE INHALER. IT IS NOT LISTED ON HER MED LIST, AND I WAS UNABLE TO FIND IT ON AN OLD OFFICE NOTE.

## 2020-04-17 NOTE — TELEPHONE ENCOUNTER
Pharmacy is calling in stating the patient was at the pharmacy requesting a prescription of  Ventolin HFA Inhaler  Pharmacy did not have any record of one and was wondering if Dr Roper would be able to send over a prescription for one.  PLEASE ADVISE  [facility]  [phone]

## 2020-04-20 ENCOUNTER — OFFICE VISIT (OUTPATIENT)
Dept: FAMILY MEDICINE CLINIC | Facility: CLINIC | Age: 55
End: 2020-04-20

## 2020-04-20 DIAGNOSIS — R06.2 WHEEZING: Primary | ICD-10-CM

## 2020-04-20 PROCEDURE — 99442 PR PHYS/QHP TELEPHONE EVALUATION 11-20 MIN: CPT | Performed by: FAMILY MEDICINE

## 2020-04-20 RX ORDER — ALBUTEROL SULFATE 90 UG/1
2 AEROSOL, METERED RESPIRATORY (INHALATION) EVERY 4 HOURS PRN
Qty: 1 INHALER | Refills: 0 | Status: SHIPPED | OUTPATIENT
Start: 2020-04-20 | End: 2020-06-26

## 2020-04-20 NOTE — PROGRESS NOTES
Sreekanth Stapleton is a 54 y.o. female.     Chief Complaint   Patient presents with   • Shortness of Breath       You have chosen to receive care through a telephone visit. Do you consent to use a telephone visit for your medical care today? Yes    Patient requested a telephone visit today during this coronavirus pandemic to discuss shortness of breath.  The patient states that intermittently in her lifetime she has had shortness of breath.  She seems to notice it when she is really active or really anxious.  She has used an albuterol inhaler in the past with relief.  She states that she sometimes notices wheezing at night.  She denies any cough, fever, or chest pain.  She has never been diagnosed with asthma.  She does not smoke.       Review of Systems   Constitutional: Negative for activity change, chills, fatigue and fever.   HENT: Negative for hearing loss, swollen glands, tinnitus and trouble swallowing.    Eyes: Negative for pain and visual disturbance.   Respiratory: Positive for shortness of breath (Intermittent) and wheezing. Negative for cough.    Cardiovascular: Negative for chest pain, palpitations and leg swelling.   Gastrointestinal: Negative for diarrhea and nausea.   Endocrine: Negative for polydipsia and polyuria.   Genitourinary: Negative for difficulty urinating and urinary incontinence.   Musculoskeletal: Negative for arthralgias, gait problem and joint swelling.   Skin: Negative for rash.   Allergic/Immunologic: Negative for immunocompromised state.   Neurological: Negative for dizziness, light-headedness and headache.   Hematological: Negative for adenopathy. Does not bruise/bleed easily.   Psychiatric/Behavioral: Negative for dysphoric mood and sleep disturbance.       The following portions of the patient's history were reviewed and updated as appropriate: allergies, current medications, past family history, past medical history, past social history, past surgical history and problem  list.    Past Medical History:   Diagnosis Date   • Adjustment disorder with mixed anxiety and depressed mood    • Allergic rhinitis    • Anemia    • Elevated LFTs    • Encounter for long-term (current) use of medications    • GERD (gastroesophageal reflux disease)    • Hyperlipidemia    • Hypertension    • Leukopenia    • Overweight    • Type II diabetes mellitus (CMS/HCC)        History reviewed. No pertinent surgical history.    Family History   Problem Relation Age of Onset   • Diabetes Other    • Hypertension Other        Social History     Socioeconomic History   • Marital status: Single     Spouse name: Not on file   • Number of children: Not on file   • Years of education: Not on file   • Highest education level: Not on file   Tobacco Use   • Smoking status: Never Smoker   Substance and Sexual Activity   • Alcohol use: No     Frequency: Never         Current Outpatient Medications:   •  atorvastatin (LIPITOR) 10 MG tablet, Take 1 tablet by mouth Every Night., Disp: 90 tablet, Rfl: 1  •  docusate sodium (COLACE) 100 MG capsule, Take 1 capsule by mouth Daily., Disp: 90 capsule, Rfl: 3  •  FEROSUL 325 (65 Fe) MG tablet, TAKE ONE TABLET BY MOUTH ONCE DAILY WITH FOOD FOR ANEMIA. TAKE WITH STOOL SOFTENER AS NEEDED, Disp: 30 tablet, Rfl: 10  •  FLUoxetine (PROzac) 20 MG capsule, Take 1 capsule by mouth Daily., Disp: 90 capsule, Rfl: 0  •  FREESTYLE LITE test strip, 1 each by Other route Daily., Disp: , Rfl: 6  •  Lancets (FREESTYLE) lancets, , Disp: , Rfl:   •  lisinopril (PRINIVIL,ZESTRIL) 40 MG tablet, Take 1 tablet by mouth Daily., Disp: 90 tablet, Rfl: 1  •  loratadine (CLARITIN) 10 MG tablet, TAKE 1 TABLET BY MOUTH EVERY DAY AS NEEDED FOR ALLERGIES, Disp: 90 tablet, Rfl: 2  •  metFORMIN ER (GLUCOPHAGE-XR) 500 MG 24 hr tablet, Take 1 tablet by mouth Daily With Breakfast., Disp: 90 tablet, Rfl: 1  •  pantoprazole (PROTONIX) 40 MG EC tablet, Take 1 tablet by mouth Daily., Disp: 90 tablet, Rfl: 1  •  albuterol  sulfate  (90 Base) MCG/ACT inhaler, Inhale 2 puffs Every 4 (Four) Hours As Needed for Wheezing., Disp: 1 inhaler, Rfl: 0    Objective     There were no vitals filed for this visit.    There is no height or weight on file to calculate BMI.    No components found for: 2D    Physical Exam   Constitutional: She is oriented to person, place, and time.   Pulmonary/Chest: Effort normal.   Neurological: She is alert and oriented to person, place, and time.   Psychiatric: She has a normal mood and affect. Her behavior is normal. Judgment and thought content normal.       Procedures    Assessment/Plan   Sameera was seen today for shortness of breath.    Diagnoses and all orders for this visit:    Wheezing  -     albuterol sulfate  (90 Base) MCG/ACT inhaler; Inhale 2 puffs Every 4 (Four) Hours As Needed for Wheezing.    Patient was advised to contact the office if the inhaler does not help with her intermittent shortness of breath and wheezing.  If symptoms worsen contact the office or go to the nearest emergency room depending on the severity.    This visit has been scheduled as a phone visit to comply with patient safety concerns in accordance with CDC recommendations during the coronavirus pandemic. Total time of discussion was 11 minutes.      There are no Patient Instructions on file for this visit.

## 2020-04-28 ENCOUNTER — TELEPHONE (OUTPATIENT)
Dept: FAMILY MEDICINE CLINIC | Facility: CLINIC | Age: 55
End: 2020-04-28

## 2020-04-28 NOTE — TELEPHONE ENCOUNTER
PT CALLED AND HAS SOME SWELLING IN HER LEFT LEG AND WANTS TO SCHEDULE A TELEPHONE VISIT.    PLEASE ADVISE     PT CALL BACK   [phone]

## 2020-05-12 ENCOUNTER — OFFICE VISIT (OUTPATIENT)
Dept: FAMILY MEDICINE CLINIC | Facility: CLINIC | Age: 55
End: 2020-05-12

## 2020-05-12 VITALS
DIASTOLIC BLOOD PRESSURE: 62 MMHG | HEIGHT: 66 IN | OXYGEN SATURATION: 99 % | TEMPERATURE: 97.3 F | HEART RATE: 120 BPM | WEIGHT: 293 LBS | SYSTOLIC BLOOD PRESSURE: 120 MMHG | BODY MASS INDEX: 47.09 KG/M2

## 2020-05-12 DIAGNOSIS — K21.9 GASTROESOPHAGEAL REFLUX DISEASE WITHOUT ESOPHAGITIS: ICD-10-CM

## 2020-05-12 DIAGNOSIS — K76.0: ICD-10-CM

## 2020-05-12 DIAGNOSIS — E11.9 TYPE 2 DIABETES MELLITUS WITHOUT COMPLICATION, WITHOUT LONG-TERM CURRENT USE OF INSULIN (HCC): ICD-10-CM

## 2020-05-12 DIAGNOSIS — I10 ESSENTIAL HYPERTENSION: ICD-10-CM

## 2020-05-12 DIAGNOSIS — E78.2 MIXED HYPERLIPIDEMIA: ICD-10-CM

## 2020-05-12 DIAGNOSIS — D64.9 ANEMIA, UNSPECIFIED TYPE: ICD-10-CM

## 2020-05-12 DIAGNOSIS — K72.90 LIVER FAILURE WITHOUT HEPATIC COMA, UNSPECIFIED CHRONICITY (HCC): Primary | ICD-10-CM

## 2020-05-12 DIAGNOSIS — G93.40: ICD-10-CM

## 2020-05-12 DIAGNOSIS — Z79.899 ENCOUNTER FOR LONG-TERM (CURRENT) USE OF MEDICATIONS: ICD-10-CM

## 2020-05-12 PROCEDURE — 99214 OFFICE O/P EST MOD 30 MIN: CPT | Performed by: FAMILY MEDICINE

## 2020-05-12 RX ORDER — LACTULOSE 10 G/15ML
30 SOLUTION ORAL; RECTAL DAILY
COMMUNITY
Start: 2020-04-25 | End: 2020-05-18 | Stop reason: HOSPADM

## 2020-05-12 RX ORDER — PANTOPRAZOLE SODIUM 40 MG/1
40 TABLET, DELAYED RELEASE ORAL DAILY
Qty: 90 TABLET | Refills: 1 | Status: SHIPPED | OUTPATIENT
Start: 2020-05-12 | End: 2020-11-02 | Stop reason: SDUPTHER

## 2020-05-12 RX ORDER — IBUPROFEN 600 MG/1
TABLET ORAL
COMMUNITY
Start: 2020-03-16 | End: 2020-05-18 | Stop reason: HOSPADM

## 2020-05-12 NOTE — PROGRESS NOTES
Sreekanth Stapleton is a 54 y.o. female.     Chief Complaint   Patient presents with   • Altered Mental Status     WENT TO [facility] FOR ALTERED MENTAL STATUS, PT WAS NOTED TO HAVE AN ABNORMAL AMMONIA LEVEL AND UTI WHILE INPATIENT       Patient is here today to follow-up from [facility] admission on April 24 for acute mental status changes.  It was found during that hospitalization that her ammonia level was 127 and the source of her mental status changes was likely due to acute liver failure.  On the ultrasound of the abdomen it did reveal some ascites.  Patient had presumed fatty liver disease.  Patient states that she was tested for hepatitis in the hospital and she did not have it.  She states that since she has been home she has been having loose bowel movements but no diarrhea.  She has not taken the lactulose in a few days.  She was told to take it, if she was unable to have a bowel movement.       Review of Systems   Constitutional: Positive for fatigue. Negative for activity change, chills and fever.   HENT: Negative for hearing loss, swollen glands, tinnitus and trouble swallowing.    Eyes: Negative for pain and visual disturbance.   Respiratory: Negative for cough and shortness of breath.    Cardiovascular: Negative for chest pain, palpitations and leg swelling.   Gastrointestinal: Negative for diarrhea and nausea.   Endocrine: Negative for polydipsia and polyuria.   Genitourinary: Negative for difficulty urinating and urinary incontinence.   Musculoskeletal: Negative for arthralgias, gait problem and joint swelling.   Skin: Negative for rash.   Allergic/Immunologic: Negative for immunocompromised state.   Neurological: Negative for dizziness, light-headedness and headache.   Hematological: Negative for adenopathy. Does not bruise/bleed easily.   Psychiatric/Behavioral: Negative for dysphoric mood and sleep disturbance.       The following portions of the patient's history were reviewed and updated as  appropriate: allergies, current medications, past family history, past medical history, past social history, past surgical history and problem list.    Past Medical History:   Diagnosis Date   • Adjustment disorder with mixed anxiety and depressed mood    • Allergic rhinitis    • Anemia    • Elevated LFTs    • Encounter for long-term (current) use of medications    • GERD (gastroesophageal reflux disease)    • Hyperlipidemia    • Hypertension    • Leukopenia    • Overweight    • Type II diabetes mellitus (CMS/[facility])        History reviewed. No pertinent surgical history.    Family History   Problem Relation Age of Onset   • Diabetes Other    • Hypertension Other        Social History     Socioeconomic History   • Marital status: Single     Spouse name: Not on file   • Number of children: Not on file   • Years of education: Not on file   • Highest education level: Not on file   Tobacco Use   • Smoking status: Never Smoker   Substance and Sexual Activity   • Alcohol use: No     Frequency: Never         Current Outpatient Medications:   •  albuterol sulfate  (90 Base) MCG/ACT inhaler, Inhale 2 puffs Every 4 (Four) Hours As Needed for Wheezing., Disp: 1 inhaler, Rfl: 0  •  docusate sodium (COLACE) 100 MG capsule, Take 1 capsule by mouth Daily., Disp: 90 capsule, Rfl: 3  •  ENULOSE 10 GM/15ML solution solution (encephalopathy), , Disp: , Rfl:   •  FEROSUL 325 (65 Fe) MG tablet, TAKE ONE TABLET BY MOUTH ONCE DAILY WITH FOOD FOR ANEMIA. TAKE WITH STOOL SOFTENER AS NEEDED, Disp: 30 tablet, Rfl: 10  •  FLUoxetine (PROzac) 20 MG capsule, Take 1 capsule by mouth Daily., Disp: 90 capsule, Rfl: 0  •  FREESTYLE LITE test strip, 1 each by Other route Daily., Disp: , Rfl: 6  •  ibuprofen (ADVIL,MOTRIN) 600 MG tablet, , Disp: , Rfl:   •  Lancets (FREESTYLE) lancets, , Disp: , Rfl:   •  lisinopril (PRINIVIL,ZESTRIL) 40 MG tablet, Take 1 tablet by mouth Daily., Disp: 90 tablet, Rfl: 1  •  loratadine (CLARITIN) 10 MG tablet, TAKE  "1 TABLET BY MOUTH EVERY DAY AS NEEDED FOR ALLERGIES, Disp: 90 tablet, Rfl: 2  •  pantoprazole (PROTONIX) 40 MG EC tablet, Take 1 tablet by mouth Daily., Disp: 90 tablet, Rfl: 1    Objective     Vitals:    05/12/20 1058   BP: 120/62   Pulse: 120   Temp: 97.3 °F (36.3 °C)   SpO2: 99%   Weight: (!) 149 kg (327 lb 9.6 oz)   Height: 167.6 cm (66\")       Body mass index is 52.88 kg/m².    No components found for: 2D    Physical Exam   Constitutional: She is oriented to person, place, and time. She appears well-developed and well-nourished.   HENT:   Head: Normocephalic and atraumatic.   Eyes: Conjunctivae are normal.   Neck: Normal range of motion. Neck supple.   Cardiovascular: Normal rate, regular rhythm, normal heart sounds and intact distal pulses.   Pulmonary/Chest: Effort normal and breath sounds normal.   Abdominal: Soft. Bowel sounds are normal.   Large abdominal girth compared to her extremities.   Musculoskeletal: Normal range of motion. She exhibits no edema.   Neurological: She is alert and oriented to person, place, and time.   Skin: Skin is warm and dry. Capillary refill takes less than 2 seconds. No rash noted.   Psychiatric: She has a normal mood and affect. Her behavior is normal. Judgment and thought content normal.   Nursing note and vitals reviewed.    Troponins were negative x2, TSH 2.4, lactic acid 1.4, creatinine 0.95, bilirubin 1.1, AST 57, ALT 28,  Hemoglobin 7.9, platelets 119, urine drug screen was negative    Procedures    Assessment/Plan   Sameera was seen today for altered mental status.    Diagnoses and all orders for this visit:    Liver failure without hepatic coma, unspecified chronicity (CMS/HCC)  -     Comprehensive Metabolic Panel  -     Ammonia  -     Ambulatory Referral to Hepatology    Fatty liver with encephalopathy  -     Comprehensive Metabolic Panel  -     Ammonia  -     Ambulatory Referral to Hepatology    Anemia, unspecified type  -     CBC & Differential    Mixed " hyperlipidemia    Gastroesophageal reflux disease without esophagitis  -     pantoprazole (PROTONIX) 40 MG EC tablet; Take 1 tablet by mouth Daily.    Essential hypertension    Encounter for long-term (current) use of medications  -     pantoprazole (PROTONIX) 40 MG EC tablet; Take 1 tablet by mouth Daily.  -     Comprehensive Metabolic Panel  -     CBC & Differential  -     Ammonia    Type 2 diabetes mellitus without complication, without long-term current use of insulin (CMS/HCC)  -     Hemoglobin A1c    I will recheck abnormal labs today (hemoglobin, platelets, LFTs, and ammonia level).  The patient will be referred to hepatology for work-up.  I will take the patient off metformin and atorvastatin today until further notice.  Patient was advised to restart lactulose and continue it unless she gets severe diarrhea of more than 5 bowel movements a day.  Follow-up will be based on labs today.        There are no Patient Instructions on file for this visit.

## 2020-05-13 LAB
ALBUMIN SERPL-MCNC: 2.4 G/DL (ref 3.8–4.9)
ALBUMIN/GLOB SERPL: 0.6 {RATIO} (ref 1.2–2.2)
ALP SERPL-CCNC: 108 IU/L (ref 39–117)
ALT SERPL-CCNC: 19 IU/L (ref 0–32)
AMMONIA PLAS-MCNC: 196 UG/DL (ref 34–178)
AST SERPL-CCNC: 45 IU/L (ref 0–40)
BASOPHILS # BLD AUTO: 0 X10E3/UL (ref 0–0.2)
BASOPHILS NFR BLD AUTO: 1 %
BILIRUB SERPL-MCNC: 0.9 MG/DL (ref 0–1.2)
BUN SERPL-MCNC: 8 MG/DL (ref 6–24)
BUN/CREAT SERPL: 9 (ref 9–23)
CALCIUM SERPL-MCNC: 8.1 MG/DL (ref 8.7–10.2)
CHLORIDE SERPL-SCNC: 105 MMOL/L (ref 96–106)
CO2 SERPL-SCNC: 24 MMOL/L (ref 20–29)
CREAT SERPL-MCNC: 0.89 MG/DL (ref 0.57–1)
EOSINOPHIL # BLD AUTO: 0.3 X10E3/UL (ref 0–0.4)
EOSINOPHIL NFR BLD AUTO: 13 %
ERYTHROCYTE [DISTWIDTH] IN BLOOD BY AUTOMATED COUNT: 14.4 % (ref 11.7–15.4)
GLOBULIN SER CALC-MCNC: 4.1 G/DL (ref 1.5–4.5)
GLUCOSE SERPL-MCNC: 115 MG/DL (ref 65–99)
HBA1C MFR BLD: 5.5 % (ref 4.8–5.6)
HCT VFR BLD AUTO: 22.3 % (ref 34–46.6)
HGB BLD-MCNC: 6.9 G/DL (ref 11.1–15.9)
IMM GRANULOCYTES # BLD AUTO: 0 X10E3/UL (ref 0–0.1)
IMM GRANULOCYTES NFR BLD AUTO: 0 %
LYMPHOCYTES # BLD AUTO: 0.8 X10E3/UL (ref 0.7–3.1)
LYMPHOCYTES NFR BLD AUTO: 32 %
MCH RBC QN AUTO: 27.1 PG (ref 26.6–33)
MCHC RBC AUTO-ENTMCNC: 30.9 G/DL (ref 31.5–35.7)
MCV RBC AUTO: 88 FL (ref 79–97)
MONOCYTES # BLD AUTO: 0.3 X10E3/UL (ref 0.1–0.9)
MONOCYTES NFR BLD AUTO: 10 %
NEUTROPHILS # BLD AUTO: 1.1 X10E3/UL (ref 1.4–7)
NEUTROPHILS NFR BLD AUTO: 44 %
PLATELET # BLD AUTO: 115 X10E3/UL (ref 150–450)
POTASSIUM SERPL-SCNC: 3.6 MMOL/L (ref 3.5–5.2)
PROT SERPL-MCNC: 6.5 G/DL (ref 6–8.5)
RBC # BLD AUTO: 2.55 X10E6/UL (ref 3.77–5.28)
SODIUM SERPL-SCNC: 142 MMOL/L (ref 134–144)
WBC # BLD AUTO: 2.4 X10E3/UL (ref 3.4–10.8)

## 2020-05-14 ENCOUNTER — APPOINTMENT (OUTPATIENT)
Dept: CT IMAGING | Facility: HOSPITAL | Age: 55
End: 2020-05-14

## 2020-05-14 ENCOUNTER — HOSPITAL ENCOUNTER (INPATIENT)
Facility: HOSPITAL | Age: 55
LOS: 4 days | Discharge: HOME | End: 2020-05-18
Attending: INTERNAL MEDICINE | Admitting: INTERNAL MEDICINE

## 2020-05-14 DIAGNOSIS — K74.4 SECONDARY BILIARY CIRRHOSIS (HCC): ICD-10-CM

## 2020-05-14 DIAGNOSIS — D50.8 OTHER IRON DEFICIENCY ANEMIA: Primary | ICD-10-CM

## 2020-05-14 LAB
ALBUMIN SERPL-MCNC: 2.6 G/DL (ref 3.5–5.2)
ALBUMIN/GLOB SERPL: 0.6 G/DL
ALP SERPL-CCNC: 111 U/L (ref 39–117)
ALT SERPL W P-5'-P-CCNC: 19 U/L (ref 1–33)
AMMONIA BLD-SCNC: 109 UMOL/L (ref 11–51)
ANION GAP SERPL CALCULATED.3IONS-SCNC: 6.8 MMOL/L (ref 5–15)
AST SERPL-CCNC: 47 U/L (ref 1–32)
BILIRUB SERPL-MCNC: 1.4 MG/DL (ref 0.2–1.2)
BUN BLD-MCNC: 6 MG/DL (ref 6–20)
BUN/CREAT SERPL: 6.5 (ref 7–25)
CALCIUM SPEC-SCNC: 7.9 MG/DL (ref 8.6–10.5)
CERULOPLASMIN SERPL-MCNC: 17 MG/DL (ref 19–39)
CHLORIDE SERPL-SCNC: 107 MMOL/L (ref 98–107)
CO2 SERPL-SCNC: 26.2 MMOL/L (ref 22–29)
CREAT BLD-MCNC: 0.92 MG/DL (ref 0.57–1)
DEPRECATED RDW RBC AUTO: 46.2 FL (ref 37–54)
ERYTHROCYTE [DISTWIDTH] IN BLOOD BY AUTOMATED COUNT: 14.6 % (ref 12.3–15.4)
FERRITIN SERPL-MCNC: 23.1 NG/ML (ref 13–150)
GFR SERPL CREATININE-BSD FRML MDRD: 64 ML/MIN/1.73
GLOBULIN UR ELPH-MCNC: 4.1 GM/DL
GLUCOSE BLD-MCNC: 110 MG/DL (ref 65–99)
GLUCOSE BLDC GLUCOMTR-MCNC: 114 MG/DL (ref 70–130)
GLUCOSE BLDC GLUCOMTR-MCNC: 118 MG/DL (ref 70–130)
GLUCOSE BLDC GLUCOMTR-MCNC: 127 MG/DL (ref 70–130)
HCT VFR BLD AUTO: 23.4 % (ref 34–46.6)
HGB BLD-MCNC: 7.2 G/DL (ref 12–15.9)
IRON 24H UR-MRATE: 20 MCG/DL (ref 37–145)
IRON SATN MFR SERPL: 6 % (ref 20–50)
MAGNESIUM SERPL-MCNC: 1.6 MG/DL (ref 1.6–2.6)
MCH RBC QN AUTO: 26.6 PG (ref 26.6–33)
MCHC RBC AUTO-ENTMCNC: 30.8 G/DL (ref 31.5–35.7)
MCV RBC AUTO: 86.3 FL (ref 79–97)
PLATELET # BLD AUTO: 124 10*3/MM3 (ref 140–450)
PMV BLD AUTO: 10.9 FL (ref 6–12)
POTASSIUM BLD-SCNC: 3.6 MMOL/L (ref 3.5–5.2)
PROT SERPL-MCNC: 6.7 G/DL (ref 6–8.5)
RBC # BLD AUTO: 2.71 10*6/MM3 (ref 3.77–5.28)
SARS-COV-2 RNA RESP QL NAA+PROBE: NOT DETECTED
SODIUM BLD-SCNC: 140 MMOL/L (ref 136–145)
TIBC SERPL-MCNC: 349 MCG/DL (ref 298–536)
TRANSFERRIN SERPL-MCNC: 234 MG/DL (ref 200–360)
WBC NRBC COR # BLD: 2.83 10*3/MM3 (ref 3.4–10.8)

## 2020-05-14 PROCEDURE — 74177 CT ABD & PELVIS W/CONTRAST: CPT

## 2020-05-14 PROCEDURE — 82390 ASSAY OF CERULOPLASMIN: CPT | Performed by: INTERNAL MEDICINE

## 2020-05-14 PROCEDURE — 80053 COMPREHEN METABOLIC PANEL: CPT | Performed by: INTERNAL MEDICINE

## 2020-05-14 PROCEDURE — 85027 COMPLETE CBC AUTOMATED: CPT | Performed by: INTERNAL MEDICINE

## 2020-05-14 PROCEDURE — 99254 IP/OBS CNSLTJ NEW/EST MOD 60: CPT | Performed by: INTERNAL MEDICINE

## 2020-05-14 PROCEDURE — 83540 ASSAY OF IRON: CPT | Performed by: INTERNAL MEDICINE

## 2020-05-14 PROCEDURE — 87635 SARS-COV-2 COVID-19 AMP PRB: CPT | Performed by: INTERNAL MEDICINE

## 2020-05-14 PROCEDURE — 82140 ASSAY OF AMMONIA: CPT | Performed by: INTERNAL MEDICINE

## 2020-05-14 PROCEDURE — 83735 ASSAY OF MAGNESIUM: CPT | Performed by: INTERNAL MEDICINE

## 2020-05-14 PROCEDURE — 25010000002 IOPAMIDOL 61 % SOLUTION: Performed by: INTERNAL MEDICINE

## 2020-05-14 PROCEDURE — 84466 ASSAY OF TRANSFERRIN: CPT | Performed by: INTERNAL MEDICINE

## 2020-05-14 PROCEDURE — 82962 GLUCOSE BLOOD TEST: CPT

## 2020-05-14 PROCEDURE — 82728 ASSAY OF FERRITIN: CPT | Performed by: INTERNAL MEDICINE

## 2020-05-14 RX ORDER — DOCUSATE SODIUM 100 MG/1
100 CAPSULE, LIQUID FILLED ORAL DAILY
Status: DISCONTINUED | OUTPATIENT
Start: 2020-05-14 | End: 2020-05-18 | Stop reason: HOSPADM

## 2020-05-14 RX ORDER — ALBUTEROL SULFATE 2.5 MG/3ML
2.5 SOLUTION RESPIRATORY (INHALATION) EVERY 6 HOURS PRN
Status: DISCONTINUED | OUTPATIENT
Start: 2020-05-14 | End: 2020-05-18 | Stop reason: HOSPADM

## 2020-05-14 RX ORDER — PANTOPRAZOLE SODIUM 40 MG/1
40 TABLET, DELAYED RELEASE ORAL DAILY
Status: DISCONTINUED | OUTPATIENT
Start: 2020-05-14 | End: 2020-05-18 | Stop reason: HOSPADM

## 2020-05-14 RX ORDER — LISINOPRIL 40 MG/1
40 TABLET ORAL DAILY
Status: DISCONTINUED | OUTPATIENT
Start: 2020-05-14 | End: 2020-05-18 | Stop reason: HOSPADM

## 2020-05-14 RX ORDER — FLUOXETINE HYDROCHLORIDE 20 MG/1
20 CAPSULE ORAL DAILY
Status: DISCONTINUED | OUTPATIENT
Start: 2020-05-14 | End: 2020-05-18 | Stop reason: HOSPADM

## 2020-05-14 RX ADMIN — IOPAMIDOL 85 ML: 612 INJECTION, SOLUTION INTRAVENOUS at 18:30

## 2020-05-14 RX ADMIN — PANTOPRAZOLE SODIUM 40 MG: 40 TABLET, DELAYED RELEASE ORAL at 16:53

## 2020-05-14 RX ADMIN — FLUOXETINE HYDROCHLORIDE 20 MG: 20 CAPSULE ORAL at 16:53

## 2020-05-14 RX ADMIN — LISINOPRIL 40 MG: 40 TABLET ORAL at 16:53

## 2020-05-14 NOTE — CONSULTS
"[facility]  Initial Inpatient Consult Note    Referring Provider: Dr Restrepo    Reason for Consultation: cirrhosis, ascites    Subjective     History of present illness:      Thank you for requesting my opinion.    This is a 54-year-old woman, previously unknown to our service who was directly admitted by her primary care physician for findings of anemia and new liver disease.  The patient is a poor historian and is quite simple in her thinking but on review of her chart, it appears that she has been hospitalized at [facility] on 2 occasions, once in March and once in April both for indications of altered mental status.  Both of these episodes were attributed to a UTI, however most recently it was found that she had elevated liver enzymes and an ultrasound was done of her abdomen showing ascites.  She has had anemia but denies any overt bleeding.  She says that \"they did lots of tests on her\" at [facility].  She does say that they put something down her throat and upper bottom but on review of the discharge summaries from both of these visits it does not appear that she has had any endoscopy.    She is a morbidly obese woman with a history of diabetes.  She denies that she is ever heard of any issues with liver disease.  There is no family history of liver disease to her knowledge.  She does not drink alcohol, she denies any history of hepatitis C.  She lives in apartment near her boyfriend and does not work.  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    She says that she otherwise was feeling fine and was planning on going for a walk today when she got called by her primary care physician.  Blood work on May 12 showed a hemoglobin of 6.9 with normocytic indices.  Platelets were low at 115.  Labs today show hemoglobin of 7.2.  Platelets of 124.  Ferritin of 23.10    She was found to have elevated ammonia levels at [place] and was treated with lactulose but she says " that she has not been taking that regularly.    She says that she feels pretty well now and does not feel that anything is wrong with her.      Past Medical History:  Past Medical History:   Diagnosis Date   • Adjustment disorder with mixed anxiety and depressed mood    • Allergic rhinitis    • Anemia    • Anemia    • Elevated LFTs    • Encounter for long-term (current) use of medications    • GERD (gastroesophageal reflux disease)    • Hyperlipidemia    • Hypertension    • Leukopenia    • Overweight    • Type II diabetes mellitus (CMS/HCC)        Past Surgical History:  No past surgical history on file.     Social History:   Social History     Tobacco Use   • Smoking status: Never Smoker   Substance Use Topics   • Alcohol use: No     Frequency: Never        Family History:  Family History   Problem Relation Age of Onset   • Diabetes Other    • Hypertension Other        Home Meds:  Medications Prior to Admission   Medication Sig Dispense Refill Last Dose   • albuterol sulfate  (90 Base) MCG/ACT inhaler Inhale 2 puffs Every 4 (Four) Hours As Needed for Wheezing. 1 inhaler 0 Taking   • ENULOSE 10 GM/15ML solution solution (encephalopathy) 30 g Daily.   Taking   • FEROSUL 325 (65 Fe) MG tablet TAKE ONE TABLET BY MOUTH ONCE DAILY WITH FOOD FOR ANEMIA. TAKE WITH STOOL SOFTENER AS NEEDED 30 tablet 10 Taking   • FLUoxetine (PROzac) 20 MG capsule Take 1 capsule by mouth Daily. 90 capsule 0 Taking   • ibuprofen (ADVIL,MOTRIN) 600 MG tablet    Taking   • lisinopril (PRINIVIL,ZESTRIL) 40 MG tablet Take 1 tablet by mouth Daily. 90 tablet 1 Taking   • loratadine (CLARITIN) 10 MG tablet TAKE 1 TABLET BY MOUTH EVERY DAY AS NEEDED FOR ALLERGIES 90 tablet 2 Taking   • pantoprazole (PROTONIX) 40 MG EC tablet Take 1 tablet by mouth Daily. 90 tablet 1    • docusate sodium (COLACE) 100 MG capsule Take 1 capsule by mouth Daily. 90 capsule 3 Taking   • FREESTYLE LITE test strip 1 each by Other route Daily.  6 Taking   • Lancets  (FREESTYLE) lancets    Taking       Current Meds:     docusate sodium 100 mg Oral Daily   FLUoxetine 20 mg Oral Daily   iopamidol 100 mL Intravenous Once in imaging   lisinopril 40 mg Oral Daily   pantoprazole 40 mg Oral Daily       Allergies:  No Known Allergies    Review of Systems  The following systems were reviewed and negative;  constitution, eyes, ENT, respiratory, cardiovascular, gastrointestinal, genitourinary, integument, musculoskeletal, neurological, behavioral/psych, endocrine and allergies / immunologic     Objective     Vital Signs  Temp:  [98.5 °F (36.9 °C)] 98.5 °F (36.9 °C)  Heart Rate:  [96] 96  Resp:  [16] 16  BP: (121)/(48) 121/48    Physical Exam:  Constitutional:   Alert, cooperative, in no acute distress, appears stated age, morbidly obese   Eyes:           Lids and lashes normal, conjunctivae and sclerae normal,   no icterus   Ears, nose, mouth and throat:  Normal appearance of external ears and nose, no oral l  lesions, no thrush, oral mucosa moist   Respiratory:    Clear to auscultation, respirations regular, even and             unlabored    Cardiovascular:   Regular rhythm and normal rate, normal S1 and S2, no        murmur, no gallop, palpable distal pulses, no lower extremity edema   Gastrointestinal:    Firm, obese nondistended, nontender to palpation, no guarding, no rebound tenderness, normal bowel sounds, no palpable masses or organomegaly  Rectal exam: deferred   Musculoskeletal:  Normal station, no atrophy, no tenderness to palpation, normal digits and nails   Skin:  Normal color, no bleeding, bruising, rashes or lesions   Lymphatics:  No palpable cervical or supraclavicular adenopathy   Psychiatric:  Judgement and insight: Limited   Orientation to person, place and time: normal   Mood and affect: normal       Results Review:   I reviewed the patient's new clinical results.    Results from last 7 days   Lab Units 05/14/20  1505 05/12/20  1159   WBC 10*3/mm3 2.83* 2.4*    HEMOGLOBIN g/dL 7.2* 6.9*   HEMATOCRIT % 23.4* 22.3*   PLATELETS 10*3/mm3 124* 115*       Results from last 7 days   Lab Units 05/14/20  1505 05/12/20  1159   SODIUM mmol/L 140 142   POTASSIUM mmol/L 3.6 3.6   CHLORIDE mmol/L 107 105   TOTAL CO2 mmol/L  --  24   CO2 mmol/L 26.2  --    BUN mg/dL 6 8   CREATININE mg/dL 0.92 0.89   CALCIUM mg/dL 7.9* 8.1*   BILIRUBIN mg/dL 1.4* 0.9   ALK PHOS U/L 111 108   ALT (SGPT) U/L 19 19   AST (SGOT) U/L 47* 45*   GLUCOSE mg/dL 110*  --              Lab Results   Lab Value Date/Time    LIPASE 70 01/28/2020 1607       Radiology:  Imaging Results (Last 72 Hours)     Procedure Component Value Units Date/Time    CT Abdomen Pelvis With Contrast [[id number]] Resulted:  05/14/20 1740     Updated:  05/14/20 1740          Assessment/Plan       * No active hospital problems. *      Impression  1.  Suspected [place]N cirrhosis: complicated by hepatic encephalopathy, ascites which would indicate decompensated/end-stage disease    2.  Iron deficiency anemia    3.  Hepatic encephalopathy    4.  Diabetes    5.  Morbid obesity    6. Suspected intellectual disability    Plan  Liver serologic work-up  Clear liquid diet  She is going to need EGD and colonoscopy for further work-up of her anemia and for variceal surveillance, will plan for bowel prep starting tomorrow and to do both procedures on the 16th  Continue lactulose  Monitor for overt bleeding  It appears that she does a good job of following up with her doctor though I am worried about her capability of understanding the complexity of her disease  3 was CT imaging to further assess liver, ascites  If there is ascites, will need paracentesis with fluid studies    Given cirrhosis, this is quite advanced disease for a 54-year-old woman and long-term prognosis is poor.      I discussed the patients findings and my recommendations with patient and nursing staff    Bibiana Smith MD  [facility]      Aneglina Walden  dictation

## 2020-05-14 NOTE — PLAN OF CARE
Problem: Patient Care Overview  Goal: Plan of Care Review  Flowsheets  Taken 5/14/2020 1616  Progress: no change  Outcome Summary: DR. FLOR SAW.CT ORDERED OF ABD/PELVIS. HGB 7.2. NO ORDERS FOR BLOOD AT THIS TIME. AMMONIA ELEVATED. RESTING COMFORTABLY WITH NO COMPLAINTS. SAFETY MAINTAINED WILL CONTINUE TO MONITOR.  Taken 5/14/2020 1344  Plan of Care Reviewed With: patient

## 2020-05-14 NOTE — H&P
HISTORY AND PHYSICAL   [facility]        Patient Identification:  Name: Sameera Stapleton  Age: 54 y.o.  Sex: female  :  1965  MRN: [id number]                     Primary Care Physician: Maia Roper DO    Chief Complaint:  54 year old female who was sent from her pcp office for direct admission; she has a history of liver disease for which she was recently hospitalized at [facility]; she was started on lactulose for elevated ammonia but has not been taking it;     History of Present Illness:   As above    Past Medical History:  Past Medical History:   Diagnosis Date   • Adjustment disorder with mixed anxiety and depressed mood    • Allergic rhinitis    • Anemia    • Anemia    • Elevated LFTs    • Encounter for long-term (current) use of medications    • GERD (gastroesophageal reflux disease)    • Hyperlipidemia    • Hypertension    • Leukopenia    • Overweight    • Type II diabetes mellitus (CMS/HCC)      Past Surgical History:  No past surgical history on file.   Home Meds:  Medications Prior to Admission   Medication Sig Dispense Refill Last Dose   • albuterol sulfate  (90 Base) MCG/ACT inhaler Inhale 2 puffs Every 4 (Four) Hours As Needed for Wheezing. 1 inhaler 0 Taking   • ENULOSE 10 GM/15ML solution solution (encephalopathy) 30 g Daily.   Taking   • FEROSUL 325 (65 Fe) MG tablet TAKE ONE TABLET BY MOUTH ONCE DAILY WITH FOOD FOR ANEMIA. TAKE WITH STOOL SOFTENER AS NEEDED 30 tablet 10 Taking   • FLUoxetine (PROzac) 20 MG capsule Take 1 capsule by mouth Daily. 90 capsule 0 Taking   • ibuprofen (ADVIL,MOTRIN) 600 MG tablet    Taking   • lisinopril (PRINIVIL,ZESTRIL) 40 MG tablet Take 1 tablet by mouth Daily. 90 tablet 1 Taking   • loratadine (CLARITIN) 10 MG tablet TAKE 1 TABLET BY MOUTH EVERY DAY AS NEEDED FOR ALLERGIES 90 tablet 2 Taking   • pantoprazole (PROTONIX) 40 MG EC tablet Take 1 tablet by mouth Daily. 90 tablet 1    • docusate sodium (COLACE) 100 MG capsule Take 1  capsule by mouth Daily. 90 capsule 3 Taking   • FREESTYLE LITE test strip 1 each by Other route Daily.  6 Taking   • Lancets (FREESTYLE) lancets    Taking       Allergies:  No Known Allergies  Immunizations:  Immunization History   Administered Date(s) Administered   • FLUARIX/FLUZONE/AFLURIA/FLULAVAL QUAD 10/25/2019     Social History:   Social History     Social History Narrative   • Not on file     Social History     Socioeconomic History   • Marital status: Single     Spouse name: Not on file   • Number of children: Not on file   • Years of education: Not on file   • Highest education level: Not on file   Tobacco Use   • Smoking status: Never Smoker   Substance and Sexual Activity   • Alcohol use: No     Frequency: Never       Family History:  Family History   Problem Relation Age of Onset   • Diabetes Other    • Hypertension Other         Review of Systems  See history of present illness and past medical history.  Patient denies headache, dizziness, syncope, falls, trauma, change in vision, change in hearing, change in taste, changes in weight, changes in appetite, focal weakness, numbness, or paresthesia.  Patient denies chest pain, palpitations, dyspnea, orthopnea, PND, cough, sinus pressure, rhinorrhea, epistaxis, hemoptysis, nausea, vomiting,hematemesis, diarrhea, constipation or hematchezia.  Denies cold or heat intolerance, polydipsia, polyuria, polyphagia. Denies hematuria, pyuria, dysuria, hesitancy, frequency or urgency. Denies consumption of raw and under cooked meats foods or change in water source.  Denies fever, chills, sweats, night sweats.  Denies missing any routine medications. Remainder of ROS is negative.    Objective:  tMax 24 hrs: Temp (24hrs), Av.5 °F (36.9 °C), Min:98.5 °F (36.9 °C), Max:98.5 °F (36.9 °C)    Vitals Ranges:   Temp:  [98.5 °F (36.9 °C)] 98.5 °F (36.9 °C)  Heart Rate:  [96] 96  Resp:  [16] 16  BP: (121)/(48) 121/48      Exam:  /48 (BP Location: Right arm, Patient  Position: Sitting)   Pulse 96   Temp 98.5 °F (36.9 °C) (Oral)   Resp 16   SpO2 99%     General Appearance:    Alert, cooperative, no distress, appears stated age   Head:    Normocephalic, without obvious abnormality, atraumatic   Eyes:    PERRL, conjunctiva/corneas clear, EOM's intact, both eyes   Ears:    Normal external ear canals, both ears   Nose:   Nares normal, septum midline, mucosa normal, no drainage    or sinus tenderness   Throat:   Lips, mucosa, and tongue normal   Neck:   Supple, symmetrical, trachea midline, no adenopathy;     thyroid:  no enlargement/tenderness/nodules; no carotid    bruit or JVD   Back:     Symmetric, no curvature, ROM normal, no CVA tenderness   Lungs:     Decreased breath sounds bilaterally, respirations unlabored   Chest Wall:    No tenderness or deformity    Heart:    Regular rate and rhythm, S1 and S2 normal, no murmur, rub   or gallop   Abdomen:     Soft, non-tender, bowel sounds active all four quadrants,     no masses, no hepatomegaly, no splenomegaly   Extremities:   Extremities normal, atraumatic, no cyanosis or edema   Pulses:   2+ and symmetric all extremities   Skin:   Skin color, texture, turgor normal, no rashes or lesions   Lymph nodes:   Cervical, supraclavicular, and axillary nodes normal   Neurologic:   CNII-XII intact, normal strength, sensation intact throughout      .    Data Review:  Labs in chart were reviewed.  WBC   Date Value Ref Range Status   05/12/2020 2.4 (L) 3.4 - 10.8 x10E3/uL Final     Hemoglobin   Date Value Ref Range Status   05/12/2020 6.9 (L) 11.1 - 15.9 g/dL Final     Comment:     **Verified by repeat analysis**     Hematocrit   Date Value Ref Range Status   05/12/2020 22.3 (L) 34.0 - 46.6 % Final     Platelets   Date Value Ref Range Status   05/12/2020 115 (L) 150 - 450 x10E3/uL Final     Sodium   Date Value Ref Range Status   05/12/2020 142 134 - 144 mmol/L Final     Potassium   Date Value Ref Range Status   05/12/2020 3.6 3.5 - 5.2 mmol/L  Final     Chloride   Date Value Ref Range Status   05/12/2020 105 96 - 106 mmol/L Final     Total CO2   Date Value Ref Range Status   05/12/2020 24 20 - 29 mmol/L Final     BUN   Date Value Ref Range Status   05/12/2020 8 6 - 24 mg/dL Final     Creatinine   Date Value Ref Range Status   05/12/2020 0.89 0.57 - 1.00 mg/dL Final     Calcium   Date Value Ref Range Status   05/12/2020 8.1 (L) 8.7 - 10.2 mg/dL Final     AST (SGOT)   Date Value Ref Range Status   05/12/2020 45 (H) 0 - 40 IU/L Final     ALT (SGPT)   Date Value Ref Range Status   05/12/2020 19 0 - 32 IU/L Final     Alkaline Phosphatase   Date Value Ref Range Status   05/12/2020 108 39 - 117 IU/L Final     No results found for: APTT, INR  No results found for: COLORU, CLARITYU, SPECGRAV, PHUR, PROTEINUR, GLUCOSEU, KETONESU, BLOODU, NITRITE, LEUKOCYTESUR, BILIRUBINUR, UROBILINOGEN, RBCUA, WBCUA, BACTERIA, UACOMMENT        Results from last 7 days   Lab Units 05/12/20  1159   HEMOGLOBIN A1C % 5.5      Imaging Results (All)     None        Patient Active Problem List   Diagnosis Code   • GERD (gastroesophageal reflux disease) K21.9   • Hypertension I10   • Hyperlipidemia E78.5   • Type II diabetes mellitus (CMS/HCC) E11.9   • Encounter for long-term (current) use of medications Z79.899   • Benign and innocent cardiac murmurs R01.0   • Class 3 severe obesity with serious comorbidity and body mass index (BMI) of 45.0 to 49.9 in adult (CMS/HCC) E66.01, Z68.42   • Adjustment disorder with mixed anxiety and depressed mood F43.23   • Liver failure without hepatic coma (CMS/HCC) K72.90   • Fatty liver with encephalopathy K76.0, G93.40       Assessment:  Cirrhosis  Ascites  Obesity  Diabetes  Hypertension  Hepatic steatosis  neutropenia    Plan:  Will ask gi to see her  Control blood sugar  May need paracentesis for further workup   Monitor blood counts  Likely low due to cirrhosis, hypersplenism  Nancy holguin per phone and with patient    Saundra Restrepo,  MD  5/14/2020  14:45

## 2020-05-15 PROBLEM — K74.4 SECONDARY BILIARY CIRRHOSIS (HCC): Status: ACTIVE | Noted: 2020-05-14

## 2020-05-15 PROBLEM — K74.60 CIRRHOSIS (HCC): Status: ACTIVE | Noted: 2020-05-15

## 2020-05-15 PROBLEM — D64.9 ABSOLUTE ANEMIA: Status: ACTIVE | Noted: 2020-05-14

## 2020-05-15 LAB
ABO GROUP BLD: NORMAL
ALPHA-FETOPROTEIN: 3.34 NG/ML (ref 0–8.3)
ANION GAP SERPL CALCULATED.3IONS-SCNC: 8.5 MMOL/L (ref 5–15)
BLD GP AB SCN SERPL QL: NEGATIVE
BUN BLD-MCNC: 6 MG/DL (ref 6–20)
BUN/CREAT SERPL: 7.5 (ref 7–25)
CALCIUM SPEC-SCNC: 8 MG/DL (ref 8.6–10.5)
CHLORIDE SERPL-SCNC: 107 MMOL/L (ref 98–107)
CO2 SERPL-SCNC: 24.5 MMOL/L (ref 22–29)
CREAT BLD-MCNC: 0.8 MG/DL (ref 0.57–1)
DEPRECATED RDW RBC AUTO: 48.2 FL (ref 37–54)
ERYTHROCYTE [DISTWIDTH] IN BLOOD BY AUTOMATED COUNT: 14.6 % (ref 12.3–15.4)
GFR SERPL CREATININE-BSD FRML MDRD: 75 ML/MIN/1.73
GLUCOSE BLD-MCNC: 87 MG/DL (ref 65–99)
GLUCOSE BLDC GLUCOMTR-MCNC: 108 MG/DL (ref 70–130)
GLUCOSE BLDC GLUCOMTR-MCNC: 133 MG/DL (ref 70–130)
GLUCOSE BLDC GLUCOMTR-MCNC: 96 MG/DL (ref 70–130)
HAV IGM SERPL QL IA: NORMAL
HBV CORE IGM SERPL QL IA: NORMAL
HBV SURFACE AG SERPL QL IA: NORMAL
HCT VFR BLD AUTO: 22.5 % (ref 34–46.6)
HCV AB SER DONR QL: NORMAL
HGB BLD-MCNC: 6.8 G/DL (ref 12–15.9)
HOLD SPECIMEN: NORMAL
INR PPP: 1.87 (ref 0.9–1.1)
MAGNESIUM SERPL-MCNC: 1.6 MG/DL (ref 1.6–2.6)
MCH RBC QN AUTO: 26.6 PG (ref 26.6–33)
MCHC RBC AUTO-ENTMCNC: 30.2 G/DL (ref 31.5–35.7)
MCV RBC AUTO: 87.9 FL (ref 79–97)
PLATELET # BLD AUTO: 115 10*3/MM3 (ref 140–450)
PMV BLD AUTO: 11.4 FL (ref 6–12)
POTASSIUM BLD-SCNC: 3.4 MMOL/L (ref 3.5–5.2)
PROTHROMBIN TIME: 21.2 SECONDS (ref 11.7–14.2)
RBC # BLD AUTO: 2.56 10*6/MM3 (ref 3.77–5.28)
RH BLD: POSITIVE
SODIUM BLD-SCNC: 140 MMOL/L (ref 136–145)
T&S EXPIRATION DATE: NORMAL
WBC NRBC COR # BLD: 2.83 10*3/MM3 (ref 3.4–10.8)

## 2020-05-15 PROCEDURE — 86923 COMPATIBILITY TEST ELECTRIC: CPT

## 2020-05-15 PROCEDURE — 82962 GLUCOSE BLOOD TEST: CPT

## 2020-05-15 PROCEDURE — 83516 IMMUNOASSAY NONANTIBODY: CPT | Performed by: INTERNAL MEDICINE

## 2020-05-15 PROCEDURE — 80048 BASIC METABOLIC PNL TOTAL CA: CPT | Performed by: INTERNAL MEDICINE

## 2020-05-15 PROCEDURE — 82104 ALPHA-1-ANTITRYPSIN PHENO: CPT | Performed by: INTERNAL MEDICINE

## 2020-05-15 PROCEDURE — 82525 ASSAY OF COPPER: CPT | Performed by: INTERNAL MEDICINE

## 2020-05-15 PROCEDURE — 86235 NUCLEAR ANTIGEN ANTIBODY: CPT | Performed by: INTERNAL MEDICINE

## 2020-05-15 PROCEDURE — 86900 BLOOD TYPING SEROLOGIC ABO: CPT | Performed by: INTERNAL MEDICINE

## 2020-05-15 PROCEDURE — 86225 DNA ANTIBODY NATIVE: CPT | Performed by: INTERNAL MEDICINE

## 2020-05-15 PROCEDURE — 82570 ASSAY OF URINE CREATININE: CPT | Performed by: INTERNAL MEDICINE

## 2020-05-15 PROCEDURE — 86901 BLOOD TYPING SEROLOGIC RH(D): CPT

## 2020-05-15 PROCEDURE — 86850 RBC ANTIBODY SCREEN: CPT | Performed by: INTERNAL MEDICINE

## 2020-05-15 PROCEDURE — 82103 ALPHA-1-ANTITRYPSIN TOTAL: CPT | Performed by: INTERNAL MEDICINE

## 2020-05-15 PROCEDURE — 99232 SBSQ HOSP IP/OBS MODERATE 35: CPT | Performed by: INTERNAL MEDICINE

## 2020-05-15 PROCEDURE — 85610 PROTHROMBIN TIME: CPT | Performed by: INTERNAL MEDICINE

## 2020-05-15 PROCEDURE — 85027 COMPLETE CBC AUTOMATED: CPT | Performed by: INTERNAL MEDICINE

## 2020-05-15 PROCEDURE — 82105 ALPHA-FETOPROTEIN SERUM: CPT | Performed by: INTERNAL MEDICINE

## 2020-05-15 PROCEDURE — 86900 BLOOD TYPING SEROLOGIC ABO: CPT

## 2020-05-15 PROCEDURE — 83735 ASSAY OF MAGNESIUM: CPT | Performed by: INTERNAL MEDICINE

## 2020-05-15 PROCEDURE — 36430 TRANSFUSION BLD/BLD COMPNT: CPT

## 2020-05-15 PROCEDURE — 81050 URINALYSIS VOLUME MEASURE: CPT | Performed by: INTERNAL MEDICINE

## 2020-05-15 PROCEDURE — 86901 BLOOD TYPING SEROLOGIC RH(D): CPT | Performed by: INTERNAL MEDICINE

## 2020-05-15 PROCEDURE — 80074 ACUTE HEPATITIS PANEL: CPT | Performed by: INTERNAL MEDICINE

## 2020-05-15 PROCEDURE — P9016 RBC LEUKOCYTES REDUCED: HCPCS

## 2020-05-15 RX ORDER — POLYETHYLENE GLYCOL 3350, SODIUM CHLORIDE, POTASSIUM CHLORIDE, SODIUM BICARBONATE, AND SODIUM SULFATE 240; 5.84; 2.98; 6.72; 22.72 G/4L; G/4L; G/4L; G/4L; G/4L
2000 POWDER, FOR SOLUTION ORAL EVERY 8 HOURS SCHEDULED
Status: COMPLETED | OUTPATIENT
Start: 2020-05-15 | End: 2020-05-16

## 2020-05-15 RX ORDER — BISACODYL 5 MG/1
20 TABLET, DELAYED RELEASE ORAL ONCE
Status: COMPLETED | OUTPATIENT
Start: 2020-05-15 | End: 2020-05-15

## 2020-05-15 RX ORDER — PHYTONADIONE 5 MG/1
5 TABLET ORAL DAILY
Status: COMPLETED | OUTPATIENT
Start: 2020-05-15 | End: 2020-05-17

## 2020-05-15 RX ADMIN — POLYETHYLENE GLYCOL-3350 AND ELECTROLYTES WITH FLAVOR PACK 2000 ML: 240; 5.84; 2.98; 6.72; 22.72 POWDER, FOR SOLUTION ORAL at 19:55

## 2020-05-15 RX ADMIN — DOCUSATE SODIUM 100 MG: 100 CAPSULE, LIQUID FILLED ORAL at 09:14

## 2020-05-15 RX ADMIN — FLUOXETINE HYDROCHLORIDE 20 MG: 20 CAPSULE ORAL at 09:14

## 2020-05-15 RX ADMIN — LISINOPRIL 40 MG: 40 TABLET ORAL at 09:14

## 2020-05-15 RX ADMIN — PANTOPRAZOLE SODIUM 40 MG: 40 TABLET, DELAYED RELEASE ORAL at 09:14

## 2020-05-15 RX ADMIN — PHYTONADIONE 5 MG: 5 TABLET ORAL at 16:57

## 2020-05-15 RX ADMIN — BISACODYL 20 MG: 5 TABLET ORAL at 16:35

## 2020-05-15 NOTE — PROGRESS NOTES
[facility]  Inpatient Progress Note    Reason for Follow Up:  Anemia, cirrhosis    Subjective     Interval History:   She denies complaints.  Tolerating diet.  Discussed results of CT imaging confirming cirrhosis.  Discussed need for endoscopy and discussed the procedures.    Current Facility-Administered Medications:   •  albuterol (PROVENTIL) nebulizer solution 0.083% 2.5 mg/3mL, 2.5 mg, Nebulization, Q6H PRN, Saundra Restrepo MD  •  docusate sodium (COLACE) capsule 100 mg, 100 mg, Oral, Daily, Saundra Restrepo MD, 100 mg at 05/15/20 0914  •  FLUoxetine (PROzac) capsule 20 mg, 20 mg, Oral, Daily, Saundra Restrepo MD, 20 mg at 05/15/20 0914  •  lisinopril (PRINIVIL,ZESTRIL) tablet 40 mg, 40 mg, Oral, Daily, Saundra Restrepo MD, 40 mg at 05/15/20 0914  •  pantoprazole (PROTONIX) EC tablet 40 mg, 40 mg, Oral, Daily, Saundra Restrepo MD, 40 mg at 05/15/20 0914  Review of Systems:     The following systems were reviewed and negative: Constitution, pulmonary, cardiac, gastrointestinal, genitourinary      Objective     Vital Signs  Temp:  [98.2 °F (36.8 °C)-98.8 °F (37.1 °C)] 98.2 °F (36.8 °C)  Heart Rate:  [] 95  Resp:  [16-18] 18  BP: ()/(48-77) 122/63  There is no height or weight on file to calculate BMI.    Intake/Output Summary (Last 24 hours) at 5/15/2020 0951  Last data filed at 5/15/2020 0606  Gross per 24 hour   Intake 220 ml   Output 300 ml   Net -80 ml     No intake/output data recorded.     Physical Exam:   General: patient awake, alert and cooperative   Eyes: Normal lids and lashes, no scleral icterus   Neck: supple, normal ROM   Skin: warm and dry, not jaundiced   Cardiovascular: regular rhythm and rate, no murmurs auscultated   Pulm: clear to auscultation bilaterally, regular and unlabored   Abdomen: soft, obese, nontender, nondistended; normal bowel sounds   Rectal: deferred   Extremities: no rash or edema   Psychiatric: Normal mood and  behavior; memory intact, simplistic thinking     Results Review:     I reviewed the patient's new clinical results.    Results from last 7 days   Lab Units 05/15/20  0559 05/14/20  1505 05/12/20  1159   WBC 10*3/mm3 2.83* 2.83* 2.4*   HEMOGLOBIN g/dL 6.8* 7.2* 6.9*   HEMATOCRIT % 22.5* 23.4* 22.3*   PLATELETS 10*3/mm3 115* 124* 115*     Results from last 7 days   Lab Units 05/15/20  0559 05/14/20  1505 05/12/20  1159   SODIUM mmol/L 140 140 142   POTASSIUM mmol/L 3.4* 3.6 3.6   CHLORIDE mmol/L 107 107 105   TOTAL CO2 mmol/L  --   --  24   CO2 mmol/L 24.5 26.2  --    BUN mg/dL 6 6 8   CREATININE mg/dL 0.80 0.92 0.89   CALCIUM mg/dL 8.0* 7.9* 8.1*   BILIRUBIN mg/dL  --  1.4* 0.9   ALK PHOS U/L  --  111 108   ALT (SGPT) U/L  --  19 19   AST (SGOT) U/L  --  47* 45*   GLUCOSE mg/dL 87 110*  --      Results from last 7 days   Lab Units 05/15/20  0559   INR  1.87*     Lab Results   Lab Value Date/Time    LIPASE 70 01/28/2020 1607       Radiology:  CT Abdomen Pelvis With Contrast   Final Result          Assessment/Plan     Patient Active Problem List   Diagnosis   • GERD (gastroesophageal reflux disease)   • Hypertension   • Hyperlipidemia   • Type II diabetes mellitus (CMS/HCC)   • Encounter for long-term (current) use of medications   • Benign and innocent cardiac murmurs   • Class 3 severe obesity with serious comorbidity and body mass index (BMI) of 45.0 to 49.9 in adult (CMS/HCC)   • Adjustment disorder with mixed anxiety and depressed mood   • Liver failure without hepatic coma (CMS/HCC)   • Fatty liver with encephalopathy       Impression  1.  Cirrhosis: Suspected Hall: Complicated by recent episodes of hepatic encephalopathy, ascites; this would be consistent with decompensated/end-stage disease    2.  Iron deficiency anemia: She has never had any endoscopy    3.  Hepatic encephalopathy: 2 recent admissions to [facility]    4.  Morbid obesity    5.  Type 2 diabetes    6.  Coagulopathy: INR greater than 1.8,  secondary to #1      Plan  EGD and colonoscopy tomorrow with Dr. Pandya for evaluation of the anemia as well as esophageal variceal surveillance.  I discussed the procedures, the bowel prep with her    Going to give her 2 units of FFP and start oral vitamin K to get her INR down.  Ideally we could have her INR under 1.8 in anticipation of endoscopy as she may need variceal banding.    Going forward, she is going to need close monitoring for her cirrhosis; she is 54 and has significant end-stage disease and her morbid obesity will exlude liver transplant    Follow hemoglobin level, no indication for transfusion needed at this time    I discussed the patients findings and my recommendations with patient and nursing staff.    Over 25 minutes was spent reviewing the patient's chart and records, in discussion with the patient, in examination of the patient, and in discussion with members of the patient's medical team.    Bibiana Smith MD

## 2020-05-15 NOTE — PLAN OF CARE
Problem: Patient Care Overview  Goal: Plan of Care Review  Outcome: Ongoing (interventions implemented as appropriate)  Flowsheets (Taken 5/15/2020 1805)  Progress: no change  Plan of Care Reviewed With: patient  Outcome Summary: HGB 6.8. PRBC x1 unit tolerated well. No s/s GI bleed. Denies pain or SOA. Plan for egd and c-scope in am.     Problem: Fall Risk (Adult)  Goal: Absence of Fall  Description  Patient will demonstrate the desired outcomes by discharge/transition of care.  Outcome: Ongoing (interventions implemented as appropriate)  Flowsheets (Taken 5/15/2020 1805)  Absence of Fall: making progress toward outcome     Problem: Liver Failure, Acute/Chronic (Adult)  Description  Prevent and manage potential problems includin. fluid/electrolyte imbalance  2. gastrointestinal complications  3. impaired coagulation  4. infection  5. malnutrition  6. neurologic deterioration  7. pain  8. renal dysfunction  9. respiratory compromise  10. situational response  Goal: Signs and Symptoms of Listed Potential Problems Will be Absent, Minimized or Managed (Liver Failure, Acute/Chronic)  Description  Signs and symptoms of listed potential problems will be absent, minimized or managed by discharge/transition of care (reference Liver Failure, Acute/Chronic (Adult) CPG).  Outcome: Ongoing (interventions implemented as appropriate)  Flowsheets (Taken 5/15/2020 1805)  Problems Assessed (Liver Failure, Acute/Chronic): all  Problems Present (Liver Failure): fluid/electrolyte imbalance; gastrointestinal complications     Problem: Diabetes, Type 2 (Adult)  Description  Prevent and manage potential problems includin. DKA (diabetic ketoacidosis)/HHS (hyperosmolar hyperglycemic state)  2. hyperglycemia  3. hypoglycemia  4. situational response  Goal: Signs and Symptoms of Listed Potential Problems Will be Absent, Minimized or Managed (Diabetes, Type 2)  Description  Signs and symptoms of listed potential problems will be  absent, minimized or managed by discharge/transition of care (reference Diabetes, Type 2 (Adult) CPG).  Outcome: Ongoing (interventions implemented as appropriate)  Flowsheets (Taken 5/15/2020 1805)  Problems Assessed (Type 2 Diabetes): all  Problems Present (Type 2 Diabetes): none     Problem: Anemia (Adult)  Goal: Identify Related Risk Factors and Signs and Symptoms  Description  Related risk factors and signs and symptoms are identified upon initiation of Human Response Clinical Practice Guideline (CPG).  Outcome: Outcome(s) achieved  Flowsheets (Taken 5/15/2020 1805)  Related Risk Factors (Anemia): chronic illness  Signs and Symptoms (Anemia): dyspnea  Goal: Symptom Improvement  Description  Patient will demonstrate the desired outcomes by discharge/transition of care.  Outcome: Ongoing (interventions implemented as appropriate)  Flowsheets (Taken 5/15/2020 1805)  Symptom Improvement: making progress toward outcome

## 2020-05-15 NOTE — PROGRESS NOTES
DAILY PROGRESS NOTE  [facility]    Patient Identification:  Name: Sameera Stapleton  Age: 54 y.o.  Sex: female  :  1965  MRN: [id number]         Primary Care Physician: Maia Roper,     Subjective: patient is eating; being transfused  Interval History: follow up for anemia, cirrhosis, ascites, hypertension, obesity    Objective:    Scheduled Meds:  docusate sodium 100 mg Oral Daily   FLUoxetine 20 mg Oral Daily   lisinopril 40 mg Oral Daily   pantoprazole 40 mg Oral Daily     Continuous Infusions:     Vital signs in last 24 hours:  Temp:  [98.2 °F (36.8 °C)-98.8 °F (37.1 °C)] 98.4 °F (36.9 °C)  Heart Rate:  [] 96  Resp:  [16-18] 18  BP: ()/(63-77) 137/74    Intake/Output:    Intake/Output Summary (Last 24 hours) at 5/15/2020 1454  Last data filed at 5/15/2020 1440  Gross per 24 hour   Intake 500 ml   Output 300 ml   Net 200 ml       Exam:  /74   Pulse 96   Temp 98.4 °F (36.9 °C) (Oral)   Resp 18   SpO2 96%     General Appearance:    Alert, cooperative, no distress, AAOx3                          Head:    Normocephalic, without obvious abnormality, atraumatic                           Eyes:    PERRL, conjunctiva/corneas clear, EOM's intact, both eyes                         Throat:   Lips, tongue, gums normal; oral mucosa pink and moist                           Neck:   Supple, symmetrical, trachea midline, no JVD                         Lungs:    Clear to auscultation bilaterally, respirations unlabored                 Chest Wall:    No tenderness or deformity                          Heart:    Regular rate and rhythm, S1 and S2 normal, no murmur,no  Rub                                      or gallop                  Abdomen:     Soft, non-tender, bowel sounds active, no masses, no                                                        organomegaly                  Extremities:   Extremities normal, atraumatic, no cyanosis or edema                        Pulses:   Pulses  palpable in all extremities                            Skin:   Skin is warm and dry,  no rashes or palpable lesions                  Neurologic:   CNII-XII intact, motor strength grossly intact, sensation grossly                                         intact to light touch, no focal deficits noted       Data Review:  Labs in chart were reviewed.  WBC   Date Value Ref Range Status   05/15/2020 2.83 (L) 3.40 - 10.80 10*3/mm3 Final     Hemoglobin   Date Value Ref Range Status   05/15/2020 6.8 (C) 12.0 - 15.9 g/dL Final     Hematocrit   Date Value Ref Range Status   05/15/2020 22.5 (L) 34.0 - 46.6 % Final     Platelets   Date Value Ref Range Status   05/15/2020 115 (L) 140 - 450 10*3/mm3 Final     Sodium   Date Value Ref Range Status   05/15/2020 140 136 - 145 mmol/L Final     Potassium   Date Value Ref Range Status   05/15/2020 3.4 (L) 3.5 - 5.2 mmol/L Final     Chloride   Date Value Ref Range Status   05/15/2020 107 98 - 107 mmol/L Final     CO2   Date Value Ref Range Status   05/15/2020 24.5 22.0 - 29.0 mmol/L Final     BUN   Date Value Ref Range Status   05/15/2020 6 6 - 20 mg/dL Final     Creatinine   Date Value Ref Range Status   05/15/2020 0.80 0.57 - 1.00 mg/dL Final     Glucose   Date Value Ref Range Status   05/15/2020 87 65 - 99 mg/dL Final     Calcium   Date Value Ref Range Status   05/15/2020 8.0 (L) 8.6 - 10.5 mg/dL Final     Magnesium   Date Value Ref Range Status   05/15/2020 1.6 1.6 - 2.6 mg/dL Final     AST (SGOT)   Date Value Ref Range Status   05/14/2020 47 (H) 1 - 32 U/L Final     ALT (SGPT)   Date Value Ref Range Status   05/14/2020 19 1 - 33 U/L Final     Alkaline Phosphatase   Date Value Ref Range Status   05/14/2020 111 39 - 117 U/L Final     INR   Date Value Ref Range Status   05/15/2020 1.87 (H) 0.90 - 1.10 Final     Patient Active Problem List   Diagnosis Code   • GERD (gastroesophageal reflux disease) K21.9   • Hypertension I10   • Hyperlipidemia E78.5   • Type II diabetes mellitus  (CMS/HCC) E11.9   • Encounter for long-term (current) use of medications Z79.899   • Benign and innocent cardiac murmurs R01.0   • Class 3 severe obesity with serious comorbidity and body mass index (BMI) of 45.0 to 49.9 in adult (CMS/HCC) E66.01, Z68.42   • Adjustment disorder with mixed anxiety and depressed mood F43.23   • Liver failure without hepatic coma (CMS/HCC) K72.90   • Fatty liver with encephalopathy K76.0, G93.40   • Cirrhosis (CMS/HCC) K74.60       Assessment:    Cirrhosis (CMS/HCC)  diabetes  Hypertension  Ascites  Cirrhosis  anemia  Plan:  egd and colonoscopy are planned  Trend hgb  Control blood sugar and blood pressure  hgb is stable  Ct scan noted  Appreciate help from gi  Medium risk  D/w patient  Saundra Restrepo MD  5/15/2020  14:54

## 2020-05-15 NOTE — PROGRESS NOTES
Discharge Planning Assessment  [facility]     Patient Name: Sameera Stapleton  MRN: [id number]  Today's Date: 5/15/2020    Admit Date: 5/14/2020    Discharge Needs Assessment     Row Name 05/15/20 1234       Living Environment    Lives With  alone    Current Living Arrangements  home/apartment/condo    Potentially Unsafe Housing Conditions  other (see comments) no concerns    Primary Care Provided by  self    Provides Primary Care For  no one    Family Caregiver if Needed  significant other    Family Caregiver Names  [place] [phone]    Quality of Family Relationships  supportive    Able to Return to Prior Arrangements  yes       Resource/Environmental Concerns    Resource/Environmental Concerns  none       Transition Planning    Patient/Family Anticipates Transition to  home    Patient/Family Anticipated Services at Transition  none    Transportation Anticipated  other (see comments);public transportation cab voucher or Lyft       Discharge Needs Assessment    Readmission Within the Last 30 Days  no previous admission in last 30 days    Concerns to be Addressed  discharge planning    Equipment Currently Used at Home  glucometer    Anticipated Changes Related to Illness  none    Equipment Needed After Discharge  none    Provided Post Acute Provider List?  N/A    N/A Provider List Comment  Patient declined list and stated she does not anticipate any needs        Discharge Plan     Row Name 05/15/20 1237       Plan    Plan  Plans to return home, will need transportation via cab voucher or Lyft at discharge    Provided Post Acute Provider Quality & Resource List?  N/A    N/A Quality & Resource List Comment  Patient does not anticipate any needs    Patient/Family in Agreement with Plan  yes    Plan Comments  CCP met with patient at bedside. CCP role explained and discharge planning discussed. Face sheet verified. Patient denies having a living will or power of . Patient's PCP is Dr. Maia Roper. Patient  lives alone in a single level home with no stairs at the entrance. Patient uses a glucometer at home and is independent with her ADLs. Patient denies past home health and subacute rehab history. Patient declined HH/SNF list stating she plans to return home at discharge and does not anticipate any needs. Patient's pharmacy is [facility]. Patient denies difficulty affording or remembering to take her medications. Patient will need transportation via cab voucher or Lyft at discharge. CCP will follow to assist with discharge home and arrange transportation. [facility]        Destination      Coordination has not been started for this encounter.      Durable Medical Equipment      Coordination has not been started for this encounter.      Dialysis/Infusion      Coordination has not been started for this encounter.      Home Medical Care      Coordination has not been started for this encounter.      Therapy      Coordination has not been started for this encounter.      Community Resources      Coordination has not been started for this encounter.          Demographic Summary     Row Name 05/15/20 1234       General Information    Admission Type  inpatient    Arrived From  emergency department    Referral Source  admission list    Reason for Consult  discharge planning    Preferred Language  English     Used During This Interaction  no        Functional Status     Row Name 05/15/20 1234       Functional Status    Usual Activity Tolerance  moderate    Current Activity Tolerance  moderate       Functional Status, IADL    Medications  independent    Meal Preparation  independent    Housekeeping  independent    Laundry  independent    Shopping  independent       Mental Status    General Appearance WDL  WDL       Mental Status Summary    Recent Changes in Mental Status/Cognitive Functioning  no changes       Employment/    Employment Status  unemployed        Psychosocial    No  documentation.       Abuse/Neglect    No documentation.       Legal    No documentation.       Substance Abuse    No documentation.       Patient Forms    No documentation.           JOSE R Valentin

## 2020-05-16 ENCOUNTER — ANESTHESIA EVENT (OUTPATIENT)
Dept: GASTROENTEROLOGY | Facility: HOSPITAL | Age: 55
End: 2020-05-16

## 2020-05-16 ENCOUNTER — ANESTHESIA (OUTPATIENT)
Dept: GASTROENTEROLOGY | Facility: HOSPITAL | Age: 55
End: 2020-05-16

## 2020-05-16 LAB
ACTIN IGG SERPL-ACNC: 9 UNITS (ref 0–19)
ANION GAP SERPL CALCULATED.3IONS-SCNC: 8.5 MMOL/L (ref 5–15)
BH BB BLOOD EXPIRATION DATE: NORMAL
BH BB BLOOD TYPE BARCODE: 5100
BH BB DISPENSE STATUS: NORMAL
BH BB PRODUCT CODE: NORMAL
BH BB UNIT NUMBER: NORMAL
BUN BLD-MCNC: 6 MG/DL (ref 6–20)
BUN/CREAT SERPL: 8.1 (ref 7–25)
CALCIUM SPEC-SCNC: 7.9 MG/DL (ref 8.6–10.5)
CHLORIDE SERPL-SCNC: 106 MMOL/L (ref 98–107)
CO2 SERPL-SCNC: 24.5 MMOL/L (ref 22–29)
CREAT BLD-MCNC: 0.74 MG/DL (ref 0.57–1)
CROSSMATCH INTERPRETATION: NORMAL
DEPRECATED MITOCHONDRIA M2 IGG SER-ACNC: <20 UNITS (ref 0–20)
DEPRECATED RDW RBC AUTO: 45.1 FL (ref 37–54)
ERYTHROCYTE [DISTWIDTH] IN BLOOD BY AUTOMATED COUNT: 14.6 % (ref 12.3–15.4)
GFR SERPL CREATININE-BSD FRML MDRD: 82 ML/MIN/1.73
GLUCOSE BLD-MCNC: 91 MG/DL (ref 65–99)
HCT VFR BLD AUTO: 23.4 % (ref 34–46.6)
HGB BLD-MCNC: 7.4 G/DL (ref 12–15.9)
INR PPP: 1.9 (ref 0.9–1.1)
MAGNESIUM SERPL-MCNC: 1.5 MG/DL (ref 1.6–2.6)
MCH RBC QN AUTO: 26.7 PG (ref 26.6–33)
MCHC RBC AUTO-ENTMCNC: 31.6 G/DL (ref 31.5–35.7)
MCV RBC AUTO: 84.5 FL (ref 79–97)
PLATELET # BLD AUTO: 111 10*3/MM3 (ref 140–450)
PMV BLD AUTO: 11.2 FL (ref 6–12)
POTASSIUM BLD-SCNC: 3.4 MMOL/L (ref 3.5–5.2)
PROTHROMBIN TIME: 21.5 SECONDS (ref 11.7–14.2)
RBC # BLD AUTO: 2.77 10*6/MM3 (ref 3.77–5.28)
SODIUM BLD-SCNC: 139 MMOL/L (ref 136–145)
UNIT  ABO: NORMAL
UNIT  RH: NORMAL
WBC NRBC COR # BLD: 3.13 10*3/MM3 (ref 3.4–10.8)

## 2020-05-16 PROCEDURE — 88342 IMHCHEM/IMCYTCHM 1ST ANTB: CPT | Performed by: INTERNAL MEDICINE

## 2020-05-16 PROCEDURE — 25010000002 PROPOFOL 10 MG/ML EMULSION: Performed by: ANESTHESIOLOGY

## 2020-05-16 PROCEDURE — 45378 DIAGNOSTIC COLONOSCOPY: CPT | Performed by: INTERNAL MEDICINE

## 2020-05-16 PROCEDURE — 36430 TRANSFUSION BLD/BLD COMPNT: CPT

## 2020-05-16 PROCEDURE — 43239 EGD BIOPSY SINGLE/MULTIPLE: CPT | Performed by: INTERNAL MEDICINE

## 2020-05-16 PROCEDURE — 85610 PROTHROMBIN TIME: CPT | Performed by: INTERNAL MEDICINE

## 2020-05-16 PROCEDURE — 0DB98ZX EXCISION OF DUODENUM, VIA NATURAL OR ARTIFICIAL OPENING ENDOSCOPIC, DIAGNOSTIC: ICD-10-PCS | Performed by: INTERNAL MEDICINE

## 2020-05-16 PROCEDURE — 85027 COMPLETE CBC AUTOMATED: CPT | Performed by: INTERNAL MEDICINE

## 2020-05-16 PROCEDURE — 83735 ASSAY OF MAGNESIUM: CPT | Performed by: INTERNAL MEDICINE

## 2020-05-16 PROCEDURE — 0DJD8ZZ INSPECTION OF LOWER INTESTINAL TRACT, VIA NATURAL OR ARTIFICIAL OPENING ENDOSCOPIC: ICD-10-PCS | Performed by: INTERNAL MEDICINE

## 2020-05-16 PROCEDURE — 0DB68ZX EXCISION OF STOMACH, VIA NATURAL OR ARTIFICIAL OPENING ENDOSCOPIC, DIAGNOSTIC: ICD-10-PCS | Performed by: INTERNAL MEDICINE

## 2020-05-16 PROCEDURE — P9017 PLASMA 1 DONOR FRZ W/IN 8 HR: HCPCS

## 2020-05-16 PROCEDURE — 88305 TISSUE EXAM BY PATHOLOGIST: CPT | Performed by: INTERNAL MEDICINE

## 2020-05-16 PROCEDURE — 86927 PLASMA FRESH FROZEN: CPT

## 2020-05-16 PROCEDURE — 25010000002 METOCLOPRAMIDE PER 10 MG: Performed by: INTERNAL MEDICINE

## 2020-05-16 PROCEDURE — 80048 BASIC METABOLIC PNL TOTAL CA: CPT | Performed by: INTERNAL MEDICINE

## 2020-05-16 RX ORDER — MAGNESIUM CARB/ALUMINUM HYDROX 105-160MG
296 TABLET,CHEWABLE ORAL ONCE
Status: COMPLETED | OUTPATIENT
Start: 2020-05-16 | End: 2020-05-16

## 2020-05-16 RX ORDER — LIDOCAINE HYDROCHLORIDE 20 MG/ML
INJECTION, SOLUTION INFILTRATION; PERINEURAL AS NEEDED
Status: DISCONTINUED | OUTPATIENT
Start: 2020-05-16 | End: 2020-05-16 | Stop reason: SURG

## 2020-05-16 RX ORDER — METOCLOPRAMIDE HYDROCHLORIDE 5 MG/ML
10 INJECTION INTRAMUSCULAR; INTRAVENOUS ONCE
Status: COMPLETED | OUTPATIENT
Start: 2020-05-16 | End: 2020-05-16

## 2020-05-16 RX ORDER — SODIUM CHLORIDE 9 MG/ML
1000 INJECTION, SOLUTION INTRAVENOUS CONTINUOUS
Status: ACTIVE | OUTPATIENT
Start: 2020-05-16 | End: 2020-05-18

## 2020-05-16 RX ORDER — SODIUM CHLORIDE 0.9 % (FLUSH) 0.9 %
10 SYRINGE (ML) INJECTION AS NEEDED
Status: DISCONTINUED | OUTPATIENT
Start: 2020-05-16 | End: 2020-05-16

## 2020-05-16 RX ORDER — PROPOFOL 10 MG/ML
VIAL (ML) INTRAVENOUS CONTINUOUS PRN
Status: DISCONTINUED | OUTPATIENT
Start: 2020-05-16 | End: 2020-05-16 | Stop reason: SURG

## 2020-05-16 RX ORDER — PROPOFOL 10 MG/ML
VIAL (ML) INTRAVENOUS AS NEEDED
Status: DISCONTINUED | OUTPATIENT
Start: 2020-05-16 | End: 2020-05-16 | Stop reason: SURG

## 2020-05-16 RX ADMIN — PROPOFOL 100 MG: 10 INJECTION, EMULSION INTRAVENOUS at 12:48

## 2020-05-16 RX ADMIN — LIDOCAINE HYDROCHLORIDE 60 MG: 20 INJECTION, SOLUTION INFILTRATION; PERINEURAL at 12:48

## 2020-05-16 RX ADMIN — LISINOPRIL 40 MG: 40 TABLET ORAL at 15:02

## 2020-05-16 RX ADMIN — PANTOPRAZOLE SODIUM 40 MG: 40 TABLET, DELAYED RELEASE ORAL at 15:02

## 2020-05-16 RX ADMIN — PROPOFOL 100 MCG/KG/MIN: 10 INJECTION, EMULSION INTRAVENOUS at 12:48

## 2020-05-16 RX ADMIN — POLYETHYLENE GLYCOL-3350 AND ELECTROLYTES WITH FLAVOR PACK 2000 ML: 240; 5.84; 2.98; 6.72; 22.72 POWDER, FOR SOLUTION ORAL at 05:57

## 2020-05-16 RX ADMIN — FLUOXETINE HYDROCHLORIDE 20 MG: 20 CAPSULE ORAL at 15:02

## 2020-05-16 RX ADMIN — PHYTONADIONE 5 MG: 5 TABLET ORAL at 15:02

## 2020-05-16 RX ADMIN — METOCLOPRAMIDE 10 MG: 5 INJECTION, SOLUTION INTRAMUSCULAR; INTRAVENOUS at 08:19

## 2020-05-16 RX ADMIN — Medication 296 ML: at 08:22

## 2020-05-16 RX ADMIN — SODIUM CHLORIDE 1000 ML: 9 INJECTION, SOLUTION INTRAVENOUS at 12:22

## 2020-05-16 NOTE — PROGRESS NOTES
Name: Sameera Stapleton ADMIT: 2020   : 1965  PCP: Maia Roper DO    MRN: [id number] LOS: 2 days   AGE/SEX: 54 y.o. female  ROOM: ENDO/ENDO     Subjective   Subjective   feeling better this am    Review of Systems   denies cp soa, rectal bleeding  Objective   Objective   Vital Signs  Temp:  [98.4 °F (36.9 °C)-98.9 °F (37.2 °C)] 98.9 °F (37.2 °C)  Heart Rate:  [] 107  Resp:  [16-18] 18  BP: (106-137)/(52-74) 120/72  SpO2:  [96 %-100 %] 100 %  on   ;   Device (Oxygen Therapy): room air  There is no height or weight on file to calculate BMI.  Physical Exam  GEN: nad  Neck: supple no jvd bruit  Lungs: cta  Abd:soft non tender positive bowel sounds  Ext:without edema  Neuro non focal  Results Review:       I reviewed the patient's new clinical results.  Results from last 7 days   Lab Units 20  0539 05/15/20  0559 20  1505 20  1159   WBC 10*3/mm3 3.13* 2.83* 2.83* 2.4*   HEMOGLOBIN g/dL 7.4* 6.8* 7.2* 6.9*   PLATELETS 10*3/mm3 111* 115* 124* 115*     Results from last 7 days   Lab Units 20  0539 05/15/20  0559 20  1505 20  1159   SODIUM mmol/L 139 140 140 142   POTASSIUM mmol/L 3.4* 3.4* 3.6 3.6   CHLORIDE mmol/L 106 107 107 105   TOTAL CO2 mmol/L  --   --   --  24   CO2 mmol/L 24.5 24.5 26.2  --    BUN mg/dL 6 6 6 8   CREATININE mg/dL 0.74 0.80 0.92 0.89   GLUCOSE mg/dL 91 87 110*  --    Estimated Creatinine Clearance: 132.1 mL/min (by C-G formula based on SCr of 0.74 mg/dL).  Results from last 7 days   Lab Units 20  1505 20  1159   ALBUMIN g/dL 2.60* 2.4*   BILIRUBIN mg/dL 1.4* 0.9   ALK PHOS U/L 111 108   AST (SGOT) U/L 47* 45*   ALT (SGPT) U/L 19 19     Results from last 7 days   Lab Units 20  0539 05/15/20  0559 20  1505 20  1159   CALCIUM mg/dL 7.9* 8.0* 7.9* 8.1*   ALBUMIN g/dL  --   --  2.60* 2.4*   MAGNESIUM mg/dL 1.5* 1.6 1.6  --        Glucose   Date/Time Value Ref Range Status   05/15/2020 1658 133 (H) 70 - 130 mg/dL Final    05/15/2020 1202 108 70 - 130 mg/dL Final   05/15/2020 0602 96 70 - 130 mg/dL Final   05/14/2020 2002 114 70 - 130 mg/dL Final   05/14/2020 1639 127 70 - 130 mg/dL Final   05/14/2020 1435 118 70 - 130 mg/dL Final         docusate sodium 100 mg Oral Daily   FLUoxetine 20 mg Oral Daily   lisinopril 40 mg Oral Daily   pantoprazole 40 mg Oral Daily   phytonadione 5 mg Oral Daily       sodium chloride 1,000 mL Last Rate: 1,000 mL (05/16/20 1222)   NPO Diet       Assessment/Plan     Active Hospital Problems    Diagnosis  POA   • Cirrhosis (CMS/HCC) [K74.60]  Yes   • Absolute anemia [D64.9]  Unknown   • 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       54 y.o. female admitted with <principal problem not specified>.  Pt receiving FFP and scheduled for endoscopy later this afternoon      · SCDs for DVT prophylaxis.  · Full code.  · Discussed with patient and ED provider.  · Anticipate discharge home in 2-3 days.      Jensen Kent MD  [facility]  05/16/20  13:21

## 2020-05-16 NOTE — NURSING NOTE
Spoke with Dr. Sarkar related to pt not completing bowel prep stating it is making her sick and they will be up here to get her to preop her for surgery around 10 am. Dr Dacosta said to just get what we can get down and she possibly might not be able to have CScope done. Pt needs two units of FFP before procedure. Will pass along to dayshift nurse

## 2020-05-16 NOTE — ANESTHESIA POSTPROCEDURE EVALUATION
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          Post Anesthesia Care and Evaluation    Patient location during evaluation: bedside  Patient participation: complete - patient participated  Level of consciousness: awake  Pain management: adequate  Airway patency: patent  Anesthetic complications: No anesthetic complications    Cardiovascular status: acceptable  Respiratory status: acceptable  Hydration status: acceptable

## 2020-05-16 NOTE — PLAN OF CARE
Problem: Patient Care Overview  Goal: Plan of Care Review  Outcome: Ongoing (interventions implemented as appropriate)  Flowsheets (Taken 5/16/2020 6078)  Progress: no change  Plan of Care Reviewed With: patient  Outcome Summary: pt completed bowel prep w/o difficulty; egd and c-scope complete; clear liquid diet in place; npo after mn for barium enema in am; no acute distress; no c/o pain; will cont to monitor  Goal: Individualization and Mutuality  Outcome: Ongoing (interventions implemented as appropriate)  Goal: Discharge Needs Assessment  Outcome: Ongoing (interventions implemented as appropriate)  Goal: Interprofessional Rounds/Family Conf  Outcome: Ongoing (interventions implemented as appropriate)     Problem: Fall Risk (Adult)  Goal: Absence of Fall  Outcome: Ongoing (interventions implemented as appropriate)     Problem: Liver Failure, Acute/Chronic (Adult)  Goal: Signs and Symptoms of Listed Potential Problems Will be Absent, Minimized or Managed (Liver Failure, Acute/Chronic)  Outcome: Ongoing (interventions implemented as appropriate)

## 2020-05-16 NOTE — ANESTHESIA PREPROCEDURE EVALUATION
Anesthesia Evaluation                  Airway   Mallampati: I  TM distance: >3 FB  Neck ROM: full  No difficulty expected  Dental - normal exam     Pulmonary - normal exam   Cardiovascular - normal exam    (+) hypertension, valvular problems/murmurs, hyperlipidemia,       Neuro/Psych  GI/Hepatic/Renal/Endo    (+) obesity, morbid obesity, GERD,  liver disease, diabetes mellitus,     Musculoskeletal     Abdominal   (+) obese,     Bowel sounds: normal.   Substance History      OB/GYN          Other                        Anesthesia Plan    ASA 4     MAC     intravenous induction     Anesthetic plan, all risks, benefits, and alternatives have been provided, discussed and informed consent has been obtained with: patient.

## 2020-05-16 NOTE — PLAN OF CARE
Problem: Patient Care Overview  Goal: Plan of Care Review  Outcome: Ongoing (interventions implemented as appropriate)  Flowsheets (Taken 5/16/2020 0424)  Progress: no change  Plan of Care Reviewed With: patient  Outcome Summary: No c/o pain, bowel prep started, npo for EGD and CScope this am. No s/s of distress noted will continue to monitor     Problem: Fall Risk (Adult)  Goal: Absence of Fall  Outcome: Ongoing (interventions implemented as appropriate)

## 2020-05-17 ENCOUNTER — APPOINTMENT (OUTPATIENT)
Dept: GENERAL RADIOLOGY | Facility: HOSPITAL | Age: 55
End: 2020-05-17

## 2020-05-17 LAB
ALBUMIN SERPL-MCNC: 2.8 G/DL (ref 3.5–5.2)
ALBUMIN/GLOB SERPL: 0.7 G/DL
ALP SERPL-CCNC: 101 U/L (ref 39–117)
ALT SERPL W P-5'-P-CCNC: 19 U/L (ref 1–33)
AMMONIA BLD-SCNC: 88 UMOL/L (ref 11–51)
ANION GAP SERPL CALCULATED.3IONS-SCNC: 9.8 MMOL/L (ref 5–15)
AST SERPL-CCNC: 48 U/L (ref 1–32)
BASOPHILS # BLD AUTO: 0.03 10*3/MM3 (ref 0–0.2)
BASOPHILS NFR BLD AUTO: 1 % (ref 0–1.5)
BH BB BLOOD EXPIRATION DATE: NORMAL
BH BB BLOOD EXPIRATION DATE: NORMAL
BH BB BLOOD TYPE BARCODE: 5100
BH BB BLOOD TYPE BARCODE: 9500
BH BB DISPENSE STATUS: NORMAL
BH BB DISPENSE STATUS: NORMAL
BH BB PRODUCT CODE: NORMAL
BH BB PRODUCT CODE: NORMAL
BH BB UNIT NUMBER: NORMAL
BH BB UNIT NUMBER: NORMAL
BILIRUB SERPL-MCNC: 1.8 MG/DL (ref 0.2–1.2)
BUN BLD-MCNC: 7 MG/DL (ref 6–20)
BUN/CREAT SERPL: 7.4 (ref 7–25)
CALCIUM SPEC-SCNC: 8.1 MG/DL (ref 8.6–10.5)
CHLORIDE SERPL-SCNC: 105 MMOL/L (ref 98–107)
CO2 SERPL-SCNC: 24.2 MMOL/L (ref 22–29)
CREAT BLD-MCNC: 0.94 MG/DL (ref 0.57–1)
DEPRECATED RDW RBC AUTO: 43.8 FL (ref 37–54)
EOSINOPHIL # BLD AUTO: 0.29 10*3/MM3 (ref 0–0.4)
EOSINOPHIL NFR BLD AUTO: 9.2 % (ref 0.3–6.2)
ERYTHROCYTE [DISTWIDTH] IN BLOOD BY AUTOMATED COUNT: 14.3 % (ref 12.3–15.4)
GFR SERPL CREATININE-BSD FRML MDRD: 62 ML/MIN/1.73
GLOBULIN UR ELPH-MCNC: 4.1 GM/DL
GLUCOSE BLD-MCNC: 135 MG/DL (ref 65–99)
HCT VFR BLD AUTO: 25.4 % (ref 34–46.6)
HGB BLD-MCNC: 7.8 G/DL (ref 12–15.9)
LYMPHOCYTES # BLD AUTO: 0.94 10*3/MM3 (ref 0.7–3.1)
LYMPHOCYTES NFR BLD AUTO: 29.8 % (ref 19.6–45.3)
MCH RBC QN AUTO: 26.4 PG (ref 26.6–33)
MCHC RBC AUTO-ENTMCNC: 30.7 G/DL (ref 31.5–35.7)
MCV RBC AUTO: 85.8 FL (ref 79–97)
MONOCYTES # BLD AUTO: 0.32 10*3/MM3 (ref 0.1–0.9)
MONOCYTES NFR BLD AUTO: 10.2 % (ref 5–12)
NEUTROPHILS # BLD AUTO: 1.56 10*3/MM3 (ref 1.7–7)
NEUTROPHILS NFR BLD AUTO: 49.5 % (ref 42.7–76)
PLATELET # BLD AUTO: 122 10*3/MM3 (ref 140–450)
PMV BLD AUTO: 11 FL (ref 6–12)
POTASSIUM BLD-SCNC: 3.3 MMOL/L (ref 3.5–5.2)
PROT SERPL-MCNC: 6.9 G/DL (ref 6–8.5)
RBC # BLD AUTO: 2.96 10*6/MM3 (ref 3.77–5.28)
SODIUM BLD-SCNC: 139 MMOL/L (ref 136–145)
UNIT  ABO: NORMAL
UNIT  ABO: NORMAL
UNIT  RH: NORMAL
UNIT  RH: NORMAL
WBC NRBC COR # BLD: 3.15 10*3/MM3 (ref 3.4–10.8)

## 2020-05-17 PROCEDURE — 80053 COMPREHEN METABOLIC PANEL: CPT | Performed by: INTERNAL MEDICINE

## 2020-05-17 PROCEDURE — 85025 COMPLETE CBC W/AUTO DIFF WBC: CPT | Performed by: INTERNAL MEDICINE

## 2020-05-17 PROCEDURE — 99232 SBSQ HOSP IP/OBS MODERATE 35: CPT | Performed by: INTERNAL MEDICINE

## 2020-05-17 PROCEDURE — 74270 X-RAY XM COLON 1CNTRST STD: CPT

## 2020-05-17 PROCEDURE — 82140 ASSAY OF AMMONIA: CPT | Performed by: INTERNAL MEDICINE

## 2020-05-17 RX ADMIN — PANTOPRAZOLE SODIUM 40 MG: 40 TABLET, DELAYED RELEASE ORAL at 10:05

## 2020-05-17 RX ADMIN — PHYTONADIONE 5 MG: 5 TABLET ORAL at 10:05

## 2020-05-17 RX ADMIN — FLUOXETINE HYDROCHLORIDE 20 MG: 20 CAPSULE ORAL at 10:06

## 2020-05-17 RX ADMIN — LISINOPRIL 40 MG: 40 TABLET ORAL at 10:05

## 2020-05-17 RX ADMIN — BARIUM SULFATE 1500 ML: 1.05 SUSPENSION ORAL; RECTAL at 16:08

## 2020-05-17 NOTE — PLAN OF CARE
Problem: Patient Care Overview  Goal: Plan of Care Review  Outcome: Ongoing (interventions implemented as appropriate)  Flowsheets (Taken 5/17/2020 3290)  Progress: no change  Plan of Care Reviewed With: patient  Outcome Summary: No c/o pain, NPO for barium enema this am, VSS. No s/s of distress noted will continue to monitor     Problem: Diabetes, Type 2 (Adult)  Goal: Signs and Symptoms of Listed Potential Problems Will be Absent, Minimized or Managed (Diabetes, Type 2)  Outcome: Ongoing (interventions implemented as appropriate)

## 2020-05-17 NOTE — PROGRESS NOTES
[facility]  Inpatient Progress Note    Reason for Follow Up: Anemia and cirrhosis    Subjective     Interval History:   EGD and colonoscopy done yesterday reports are under the media section of epic, I am not absolutely sure I cleared her right colon    Current Facility-Administered Medications:   •  albuterol (PROVENTIL) nebulizer solution 0.083% 2.5 mg/3mL, 2.5 mg, Nebulization, Q6H PRN, Aaron Pandya MD  •  docusate sodium (COLACE) capsule 100 mg, 100 mg, Oral, Daily, Aaron Pandya MD, 100 mg at 05/15/20 0914  •  FLUoxetine (PROzac) capsule 20 mg, 20 mg, Oral, Daily, Aaron Pandya MD, 20 mg at 05/17/20 1006  •  lisinopril (PRINIVIL,ZESTRIL) tablet 40 mg, 40 mg, Oral, Daily, Aaron Pandya MD, 40 mg at 05/17/20 1005  •  pantoprazole (PROTONIX) EC tablet 40 mg, 40 mg, Oral, Daily, Aaron Pandya MD, 40 mg at 05/17/20 1005  •  sodium chloride 0.9 % infusion 1,000 mL, 1,000 mL, Intravenous, Continuous, Aaron Pandya MD, Stopped at 05/16/20 1333  Review of Systems:    She has weakness fatigue all other systems reviewed and negative    Objective     Vital Signs  Temp:  [97.7 °F (36.5 °C)-98.4 °F (36.9 °C)] 98.4 °F (36.9 °C)  Heart Rate:  [] 93  Resp:  [16-18] 16  BP: (112-140)/(65-83) 140/83  There is no height or weight on file to calculate BMI.    Intake/Output Summary (Last 24 hours) at 5/17/2020 1242  Last data filed at 5/16/2020 2343  Gross per 24 hour   Intake 120 ml   Output --   Net 120 ml     No intake/output data recorded.     Physical Exam:   General: patient awake, alert and cooperative   Eyes: Normal lids and lashes, no scleral icterus   Neck: supple, normal ROM   Skin: warm and dry, not jaundiced   Cardiovascular: regular rhythm and rate, no murmurs auscultated   Pulm: clear to auscultation bilaterally, regular and unlabored   Abdomen: soft, nontender, nondistended; normal bowel sounds   Extremities: no rash or edema   Psychiatric: Normal mood and behavior;  memory intact     Results Review:     I reviewed the patient's new clinical results.    Results from last 7 days   Lab Units 05/17/20  0856 05/16/20  0539 05/15/20  0559   WBC 10*3/mm3 3.15* 3.13* 2.83*   HEMOGLOBIN g/dL 7.8* 7.4* 6.8*   HEMATOCRIT % 25.4* 23.4* 22.5*   PLATELETS 10*3/mm3 122* 111* 115*     Results from last 7 days   Lab Units 05/17/20  0856 05/16/20  0539 05/15/20  0559 05/14/20  1505  05/12/20  1159   SODIUM mmol/L 139 139 140 140   < > 142   POTASSIUM mmol/L 3.3* 3.4* 3.4* 3.6   < > 3.6   CHLORIDE mmol/L 105 106 107 107   < > 105   TOTAL CO2 mmol/L  --   --   --   --   --  24   CO2 mmol/L 24.2 24.5 24.5 26.2   < >  --    BUN mg/dL 7 6 6 6   < > 8   CREATININE mg/dL 0.94 0.74 0.80 0.92   < > 0.89   CALCIUM mg/dL 8.1* 7.9* 8.0* 7.9*   < > 8.1*   BILIRUBIN mg/dL 1.8*  --   --  1.4*  --  0.9   ALK PHOS U/L 101  --   --  111  --  108   ALT (SGPT) U/L 19  --   --  19  --  19   AST (SGOT) U/L 48*  --   --  47*  --  45*   GLUCOSE mg/dL 135* 91 87 110*   < >  --     < > = values in this interval not displayed.     Results from last 7 days   Lab Units 05/16/20  0539 05/15/20  0559   INR  1.90* 1.87*     Lab Results   Lab Value Date/Time    LIPASE 70 01/28/2020 1607       Radiology:  CT Abdomen Pelvis With Contrast   Final Result      FL Barium Enema Air Double Contrast    (Results Pending)       Assessment/Plan     Patient Active Problem List   Diagnosis   • GERD (gastroesophageal reflux disease)   • Hypertension   • Hyperlipidemia   • Type II diabetes mellitus (CMS/HCC)   • Encounter for long-term (current) use of medications   • Benign and innocent cardiac murmurs   • Class 3 severe obesity with serious comorbidity and body mass index (BMI) of 45.0 to 49.9 in adult (CMS/HCC)   • Adjustment disorder with mixed anxiety and depressed mood   • Liver failure without hepatic coma (CMS/HCC)   • Fatty liver with encephalopathy   • Cirrhosis (CMS/HCC)   • Absolute anemia   • Secondary biliary cirrhosis (CMS/HCC)      Impression  1.  Cirrhosis: Suspected Hall: Complicated by recent episodes of hepatic encephalopathy, ascites; this would be consistent with decompensated/end-stage disease     2.  Iron deficiency anemia: EGD with small varices, colonoscopy appeared normal but I could not clear the cecum     3.  Hepatic encephalopathy: 2 recent admissions to [facility]     4.  Morbid obesity     5.  Type 2 diabetes     6.  Coagulopathy: INR greater than 1.8 on admission, secondary to #1    Plan:    Await barium enema results  Can follow-up with our GI office in 2 weeks  She should call for an appointment  Going forward, she is going to need close monitoring for her cirrhosis; she is 54 and has significant end-stage disease and her morbid obesity will exlude liver transplant  Okay to discharge home if barium enema is okay      I discussed the patients findings and my recommendations with patient and nursing staff.    Aaron Pandya MD

## 2020-05-17 NOTE — PROGRESS NOTES
Name: Sameera Stapleton ADMIT: 2020   : 1965  PCP: Maia Roper DO    MRN: [id number] LOS: 3 days   AGE/SEX: 54 y.o. female  ROOM: [facility]     Subjective   Subjective   No new problems noted.     Review of Systems   she denies chest pain,soa,cough, abdominal pain. Asking to go home.  Objective   Objective   Vital Signs  Temp:  [97.7 °F (36.5 °C)-98.9 °F (37.2 °C)] 98.4 °F (36.9 °C)  Heart Rate:  [] 93  Resp:  [16-18] 16  BP: (112-140)/(65-83) 140/83  SpO2:  [96 %-100 %] 97 %  on   ;   Device (Oxygen Therapy): room air  There is no height or weight on file to calculate BMI.  Physical Exam  Physical Exam:  Constitutional:   Alert, cooperative, in no acute distress, appears stated age, morbidly obese   Eyes:           conjunctivae and sclerae normal,    Ears, nose, mouth and throat:  Normal appearance of external ears and nose, no oral l  lesions, no thrush, oral mucosa moist   Respiratory:    Clear to auscultation, respirations regular, even and             unlabored    Cardiovascular:   Regular rhythm and normal rate, normal S1 and S2, no        murmur, no gallop, palpable distal pulses, no lower extremity edema   Abdomen:    Firm, obese nondistended, nontender to palpation, no guarding, no rebound tenderness, normal bowel sounds, no palpable masses or organomegaly       Neuro: non focal        Psychiatric:  alert         Results Review:       I reviewed the patient's new clinical results.  Results from last 7 days   Lab Units 20  0856 20  0539 05/15/20  0559 20  1505   WBC 10*3/mm3 3.15* 3.13* 2.83* 2.83*   HEMOGLOBIN g/dL 7.8* 7.4* 6.8* 7.2*   PLATELETS 10*3/mm3 122* 111* 115* 124*     Results from last 7 days   Lab Units 20  0856 20  0539 05/15/20  0559 20  1505   SODIUM mmol/L 139 139 140 140   POTASSIUM mmol/L 3.3* 3.4* 3.4* 3.6   CHLORIDE mmol/L 105 106 107 107   CO2 mmol/L 24.2 24.5 24.5 26.2   BUN mg/dL 7 6 6 6   CREATININE mg/dL 0.94 0.74 0.80 0.92      GLUCOSE mg/dL 135* 91 87 110*   Estimated Creatinine Clearance: 104 mL/min (by C-G formula based on SCr of 0.94 mg/dL).  Results from last 7 days   Lab Units 20  0856 20  1505 20  1159   ALBUMIN g/dL 2.80* 2.60* 2.4*   BILIRUBIN mg/dL 1.8* 1.4* 0.9   ALK PHOS U/L 101 111 108   AST (SGOT) U/L 48* 47* 45*   ALT (SGPT) U/L 19 19 19     Results from last 7 days   Lab Units 20  0856 20  0539 05/15/20  0559 20  1505 20  1159   CALCIUM mg/dL 8.1* 7.9* 8.0* 7.9* 8.1*   ALBUMIN g/dL 2.80*  --   --  2.60* 2.4*   MAGNESIUM mg/dL  --  1.5* 1.6 1.6  --        Glucose   Date/Time Value Ref Range Status   05/15/2020 1658 133 (H) 70 - 130 mg/dL Final   05/15/2020 1202 108 70 - 130 mg/dL Final   05/15/2020 0602 96 70 - 130 mg/dL Final   2020 2002 114 70 - 130 mg/dL Final   2020 1639 127 70 - 130 mg/dL Final   2020 1435 118 70 - 130 mg/dL Final       CT Abdomen Pelvis With Contrast  CT SCAN OF THE ABDOMEN AND PELVIS WITHOUT CONTRAST     HISTORY: Abdominal distention. Liver failure with cirrhosis and portal  hypertension.     The CT scan was performed through the abdomen and pelvis with  intravenous contrast and demonstrates the followin. There are no prior exams for comparison. The liver has a contracted  and vaguely nodular contour and this is combined with a slightly  prominent spleen and moderate amount of ascites scattered throughout the  abdomen and extending into the pelvis and most consistent with  cirrhosis. No significant varices are identified by CT. There is no  portal vein thrombosis. No focal liver lesions are identified.  2. The lung bases are clear. The pancreas and both adrenal glands are  unremarkable. There are several tiny nonobstructing stones involving  both kidneys. There is no aortic aneurysm or retroperitoneal  lymphadenopathy.  3. The patient is markedly obese. In the pelvis the uterus is normal in  size. The urinary bladder is somewhat  decompressed but has a smooth  contour.                 Radiation dose reduction techniques were utilized, including automated  exposure control and exposure modulation based on body size.     This report was finalized on 5/14/2020 7:19 PM by Dr. Wesley Brownlee M.D.           docusate sodium 100 mg Oral Daily   FLUoxetine 20 mg Oral Daily   lisinopril 40 mg Oral Daily   pantoprazole 40 mg Oral Daily       sodium chloride 1,000 mL Last Rate: Stopped (05/16/20 1333)   Diet Clear Liquid       Assessment/Plan     Active Hospital Problems    Diagnosis  POA   • Cirrhosis (CMS/HCC) [K74.60]  Yes   • Absolute anemia [D64.9]  Yes   • Secondary biliary cirrhosis (CMS/HCC) [K74.4]  Yes   • Class 3 severe obesity with serious comorbidity and body mass index (BMI) of 45.0 to 49.9 in adult (CMS/HCC) [E66.01, Z68.42]  Not Applicable   • Hypertension [I10]  Yes   • Type II diabetes mellitus (CMS/HCC) [E11.9]  Yes      Resolved Hospital Problems   No resolved problems to display.       54 y.o. female admitted with <principal problem not specified>.    · Admitted with probable BELTRÁN with hepatic encephalopathy and iron def anemia. S/P EGD and colonoscopy yesterday per GI and scheduled for barium enema in am.  · SCDs for DVT prophylaxis.  · Full code.  · Discussed with patient and ED provider.  · Anticipate discharge home in 1-2 days.      Jensen Kent MD  [facility]  05/17/20  11:25

## 2020-05-18 ENCOUNTER — APPOINTMENT (OUTPATIENT)
Dept: GENERAL RADIOLOGY | Facility: HOSPITAL | Age: 55
End: 2020-05-18

## 2020-05-18 VITALS
RESPIRATION RATE: 18 BRPM | HEART RATE: 90 BPM | TEMPERATURE: 98.3 F | OXYGEN SATURATION: 96 % | SYSTOLIC BLOOD PRESSURE: 134 MMHG | DIASTOLIC BLOOD PRESSURE: 73 MMHG

## 2020-05-18 LAB
ALBUMIN SERPL-MCNC: 2.3 G/DL (ref 3.5–5.2)
ALBUMIN/GLOB SERPL: 0.6 G/DL
ALP SERPL-CCNC: 81 U/L (ref 39–117)
ALT SERPL W P-5'-P-CCNC: 16 U/L (ref 1–33)
ANION GAP SERPL CALCULATED.3IONS-SCNC: 8.9 MMOL/L (ref 5–15)
AST SERPL-CCNC: 42 U/L (ref 1–32)
BASOPHILS # BLD AUTO: 0.03 10*3/MM3 (ref 0–0.2)
BASOPHILS NFR BLD AUTO: 1.2 % (ref 0–1.5)
BILIRUB SERPL-MCNC: 1.4 MG/DL (ref 0.2–1.2)
BUN BLD-MCNC: 7 MG/DL (ref 6–20)
BUN/CREAT SERPL: 8.4 (ref 7–25)
CALCIUM SPEC-SCNC: 7.7 MG/DL (ref 8.6–10.5)
CENTROMERE B AB SER-ACNC: <0.2 AI (ref 0–0.9)
CHLORIDE SERPL-SCNC: 106 MMOL/L (ref 98–107)
CHROMATIN AB SERPL-ACNC: 0.8 AI (ref 0–0.9)
CO2 SERPL-SCNC: 26.1 MMOL/L (ref 22–29)
CREAT BLD-MCNC: 0.83 MG/DL (ref 0.57–1)
DEPRECATED RDW RBC AUTO: 43.6 FL (ref 37–54)
DSDNA AB SER-ACNC: 10 IU/ML (ref 0–9)
ENA JO1 AB SER-ACNC: <0.2 AI (ref 0–0.9)
ENA RNP AB SER-ACNC: 0.9 AI (ref 0–0.9)
ENA SCL70 AB SER-ACNC: <0.2 AI (ref 0–0.9)
ENA SM AB SER-ACNC: <0.2 AI (ref 0–0.9)
ENA SS-A AB SER-ACNC: <0.2 AI (ref 0–0.9)
ENA SS-B AB SER-ACNC: <0.2 AI (ref 0–0.9)
EOSINOPHIL # BLD AUTO: 0.31 10*3/MM3 (ref 0–0.4)
EOSINOPHIL NFR BLD AUTO: 12.2 % (ref 0.3–6.2)
ERYTHROCYTE [DISTWIDTH] IN BLOOD BY AUTOMATED COUNT: 14.3 % (ref 12.3–15.4)
GFR SERPL CREATININE-BSD FRML MDRD: 72 ML/MIN/1.73
GLOBULIN UR ELPH-MCNC: 3.7 GM/DL
GLUCOSE BLD-MCNC: 96 MG/DL (ref 65–99)
HCT VFR BLD AUTO: 22.8 % (ref 34–46.6)
HGB BLD-MCNC: 7.2 G/DL (ref 12–15.9)
LYMPHOCYTES # BLD AUTO: 0.98 10*3/MM3 (ref 0.7–3.1)
LYMPHOCYTES NFR BLD AUTO: 38.4 % (ref 19.6–45.3)
Lab: ABNORMAL
MAGNESIUM SERPL-MCNC: 1.6 MG/DL (ref 1.6–2.6)
MCH RBC QN AUTO: 26.9 PG (ref 26.6–33)
MCHC RBC AUTO-ENTMCNC: 31.6 G/DL (ref 31.5–35.7)
MCV RBC AUTO: 85.1 FL (ref 79–97)
MONOCYTES # BLD AUTO: 0.28 10*3/MM3 (ref 0.1–0.9)
MONOCYTES NFR BLD AUTO: 11 % (ref 5–12)
NEUTROPHILS # BLD AUTO: 0.94 10*3/MM3 (ref 1.7–7)
NEUTROPHILS NFR BLD AUTO: 36.8 % (ref 42.7–76)
PLATELET # BLD AUTO: 97 10*3/MM3 (ref 140–450)
PMV BLD AUTO: 11.2 FL (ref 6–12)
POTASSIUM BLD-SCNC: 3.6 MMOL/L (ref 3.5–5.2)
PROT SERPL-MCNC: 6 G/DL (ref 6–8.5)
RBC # BLD AUTO: 2.68 10*6/MM3 (ref 3.77–5.28)
SODIUM BLD-SCNC: 141 MMOL/L (ref 136–145)
WBC NRBC COR # BLD: 2.55 10*3/MM3 (ref 3.4–10.8)

## 2020-05-18 PROCEDURE — 85025 COMPLETE CBC W/AUTO DIFF WBC: CPT | Performed by: INTERNAL MEDICINE

## 2020-05-18 PROCEDURE — 80053 COMPREHEN METABOLIC PANEL: CPT | Performed by: INTERNAL MEDICINE

## 2020-05-18 PROCEDURE — 99231 SBSQ HOSP IP/OBS SF/LOW 25: CPT | Performed by: INTERNAL MEDICINE

## 2020-05-18 PROCEDURE — 83735 ASSAY OF MAGNESIUM: CPT | Performed by: INTERNAL MEDICINE

## 2020-05-18 RX ADMIN — FLUOXETINE HYDROCHLORIDE 20 MG: 20 CAPSULE ORAL at 08:19

## 2020-05-18 RX ADMIN — LISINOPRIL 40 MG: 40 TABLET ORAL at 08:19

## 2020-05-18 RX ADMIN — PANTOPRAZOLE SODIUM 40 MG: 40 TABLET, DELAYED RELEASE ORAL at 08:19

## 2020-05-18 RX ADMIN — DOCUSATE SODIUM 100 MG: 100 CAPSULE, LIQUID FILLED ORAL at 08:19

## 2020-05-18 NOTE — PLAN OF CARE
Problem: Patient Care Overview  Goal: Plan of Care Review  Outcome: Ongoing (interventions implemented as appropriate)  Flowsheets (Taken 5/18/2020 1214)  Progress: improving  Plan of Care Reviewed With: patient  Outcome Summary: Denies pain.  Possible DC today.  Refusing bed alarm.  A&O x4.  Last Hgb=7.8.

## 2020-05-18 NOTE — DISCHARGE SUMMARY
PHYSICIAN DISCHARGE SUMMARY                                                                        [facility]    Patient Identification:  Name: Sameera Stapleton  Age: 54 y.o.  Sex: female  :  1965  MRN: [id number]  Primary Care Physician: Maia Roper DO    Admit date: 2020  Discharge date and time:20    Discharged Condition: good    Discharge Diagnoses:  Hypertension    Type II diabetes mellitus (CMS/HCC)    Class 3 severe obesity with serious comorbidity and body mass index (BMI) of 45.0 to 49.9 in adult (CMS/HCC)    Cirrhosis (CMS/HCC)    Absolute anemia    Secondary biliary cirrhosis (CMS/HCC)   iron deficiency  Patient Active Problem List   Diagnosis Code   • GERD (gastroesophageal reflux disease) K21.9   • Hypertension I10   • Hyperlipidemia E78.5   • Type II diabetes mellitus (CMS/HCC) E11.9   • Encounter for long-term (current) use of medications Z79.899   • Benign and innocent cardiac murmurs R01.0   • Class 3 severe obesity with serious comorbidity and body mass index (BMI) of 45.0 to 49.9 in adult (CMS/HCC) E66.01, Z68.42   • Adjustment disorder with mixed anxiety and depressed mood F43.23   • Liver failure without hepatic coma (CMS/HCC) K72.90   • Fatty liver with encephalopathy K76.0, G93.40   • Cirrhosis (CMS/HCC) K74.60   • Absolute anemia D64.9   • Secondary biliary cirrhosis (CMS/HCC) K74.4       PMHX:   Past Medical History:   Diagnosis Date   • Adjustment disorder with mixed anxiety and depressed mood    • Allergic rhinitis    • Anemia    • Anemia    • Elevated LFTs    • Encounter for long-term (current) use of medications    • GERD (gastroesophageal reflux disease)    • Hyperlipidemia    • Hypertension    • Leukopenia    • Overweight    • Type II diabetes mellitus (CMS/HCC)      PSHX:   Past Surgical History:   Procedure Laterality Date   • COLONOSCOPY N/A 2020    Procedure: COLONOSCOPY TO  ASCENDING COLON;  Surgeon: Aaron Mack MD;  Location: [facility] ENDOSCOPY;  Service: Gastroenterology;  Laterality: N/A;  ANEMIA  --ABORTED AT ASCENDING COLON, INTERNAL HEMORRHOIDS    • ENDOSCOPY N/A 5/16/2020    Procedure: ESOPHAGOGASTRODUODENOSCOPY;  Surgeon: Aaron Mack MD;  Location: [facility] ENDOSCOPY;  Service: Gastroenterology;  Laterality: N/A;  ANEMIA, FREEDMAN, CIRRHOSIS   --GRADE 1 VARICES        Hospital Course: Sameera Stapleton  Was sent for direct admission for her pcp office due to worsening anemia and for workup for cirrhosis; she has had hepatic encephalopathy and ascites consistent with end stage liver disease per dr mack;  her hgb on presentation was 6.9; she was transfused and seen by gi; an egd and colonoscopy were done; she was iron deficient; a barium enema was done yesterday as well; small esophageal varices were seen per egd; cirrhosis is suspected to be on the basis of freedman; outpatient follow up with her pcp and gi are recommended  Consults:     Consults     Date and Time Order Name Status Description    5/14/2020 1452 Inpatient Gastroenterology Consult Completed         Results from last 7 days   Lab Units 05/18/20  0550   WBC 10*3/mm3 2.55*   HEMOGLOBIN g/dL 7.2*   HEMATOCRIT % 22.8*   PLATELETS 10*3/mm3 97*     Results from last 7 days   Lab Units 05/18/20  0550   SODIUM mmol/L 141   POTASSIUM mmol/L 3.6   CHLORIDE mmol/L 106   CO2 mmol/L 26.1   BUN mg/dL 7   CREATININE mg/dL 0.83   GLUCOSE mg/dL 96   CALCIUM mg/dL 7.7*     Significant Diagnostic Studies: Labs in chart were reviewed.  Imaging Results (All)     Procedure Component Value Units Date/Time    FL Barium Enema Single Contrast [[id number]] Collected:  05/17/20 1445     Updated:  05/17/20 1509    Narrative:       BARIUM ENEMA     HISTORY: Anemia. Cirrhosis and ascites.     An initial plain film of the abdomen is unremarkable. On barium enema,  there is eventual filling of the entire colon with reflux into the  appendix and into a  normal-appearing terminal ileum. The colon is  somewhat tortuous and there are a few scattered diverticuli throughout  the colon but no evidence of diverticulitis. No polypoid lesions are  identified. The patient evacuates quite well and the visualized mucosal  pattern is unremarkable.     CONCLUSION: Mildly tortuous colon with some scattered diverticulosis.  Otherwise negative exam.     Forty-three images were obtained and the fluoroscopy time measures 64  seconds.     This report was finalized on 2020 3:06 PM by Dr. Wesley Brownlee M.D.       CT Abdomen Pelvis With Contrast [[id number]] Collected:  20     Updated:  20    Narrative:       CT SCAN OF THE ABDOMEN AND PELVIS WITHOUT CONTRAST     HISTORY: Abdominal distention. Liver failure with cirrhosis and portal  hypertension.     The CT scan was performed through the abdomen and pelvis with  intravenous contrast and demonstrates the followin. There are no prior exams for comparison. The liver has a contracted  and vaguely nodular contour and this is combined with a slightly  prominent spleen and moderate amount of ascites scattered throughout the  abdomen and extending into the pelvis and most consistent with  cirrhosis. No significant varices are identified by CT. There is no  portal vein thrombosis. No focal liver lesions are identified.  2. The lung bases are clear. The pancreas and both adrenal glands are  unremarkable. There are several tiny nonobstructing stones involving  both kidneys. There is no aortic aneurysm or retroperitoneal  lymphadenopathy.  3. The patient is markedly obese. In the pelvis the uterus is normal in  size. The urinary bladder is somewhat decompressed but has a smooth  contour.                 Radiation dose reduction techniques were utilized, including automated  exposure control and exposure modulation based on body size.     This report was finalized on 2020 7:19 PM by Dr. Wesley Brownlee M.D.            /73 (BP Location: Right arm, Patient Position: Lying)   Pulse 90   Temp 98.3 °F (36.8 °C) (Oral)   Resp 18   SpO2 96%     Discharge Exam:  General Appearance:    Alert, cooperative, no distress, AAOx3                          Head:    Normocephalic, without obvious abnormality, atraumatic                          Eyes:    PERRL, conjunctiva/corneas clear, EOM's intact, both eyes                        Throat:   Lips, tongue, gums normal; oral mucosa pink and moist                          Neck:   Supple, symmetrical, trachea midline, no JVD                        Lungs:     Clear to auscultation bilaterally, respirations unlabored                Chest Wall:    No tenderness or deformity                        Heart:    Regular rate and rhythm, S1 and S2 normal, no murmur,no  Rub                                       or gallop                  Abdomen:     Soft, non-tender, bowel sounds active, no masses, no                                                        organomegaly                  Extremities:   Extremities normal, atraumatic, no cyanosis or edema, pulses                                           palpable in all extremities                             Skin:   Skin is warm and dry,  no rashes or palpable lesions                  Neurologic:   CNII-XII intact, motor strength grossly intact, sensation grossly                                           intact to light touch, no focal deficits noted     Disposition:  Home    Patient Instructions:      Discharge Medications      Continue These Medications      Instructions Start Date   albuterol sulfate  (90 Base) MCG/ACT inhaler  Commonly known as:  PROVENTIL HFA;VENTOLIN HFA;PROAIR HFA   2 puffs, Inhalation, Every 4 Hours PRN      docusate sodium 100 MG capsule  Commonly known as:  COLACE   100 mg, Oral, Daily      FLUoxetine 20 MG capsule  Commonly known as:  PROzac   20 mg, Oral, Daily      freestyle lancets   No dose, route, or  frequency recorded.      FREESTYLE LITE test strip  Generic drug:  glucose blood   1 each, Other, Daily      lisinopril 40 MG tablet  Commonly known as:  PRINIVIL,ZESTRIL   40 mg, Oral, Daily      loratadine 10 MG tablet  Commonly known as:  CLARITIN   TAKE 1 TABLET BY MOUTH EVERY DAY AS NEEDED FOR ALLERGIES      pantoprazole 40 MG EC tablet  Commonly known as:  PROTONIX   40 mg, Oral, Daily      FeroSul 325 (65 FE) MG tablet po daily  Generic drug:  ferrous sulfate   Stop These Medications    Enulose 10 GM/15ML solution solution (encephalopathy)  Generic drug:  lactulose          ibuprofen 600 MG tablet  Commonly known as:  ADVIL,MOTRIN            Future Appointments   Date Time Provider Department Center   8/14/2020  1:00 PM Maia Roper DO [facility]  [facility]     Follow-up Information     Maia Roper DO .    Specialty:  Family Medicine  Contact information:  [address] [id number] [phone]                 Discharge Order (From admission, onward)     Start     Ordered    05/18/20 1401  Discharge patient  Once     Expected Discharge Date:  05/18/20    Discharge Disposition:  Home or Self Care    Physician of Record for Attribution - Please select from Treatment Team:  SAUNDRA LOPES [[id number]]    Review needed by CMO to determine Physician of Record:  No       Question Answer Comment   Physician of Record for Attribution - Please select from Treatment Team SAUNDRA LOPES    Review needed by CMO to determine Physician of Record No        05/18/20 1401                     Signed:  Saundra Lopes MD  5/18/2020  14:01

## 2020-05-18 NOTE — PROGRESS NOTES
Case Management Discharge Note      Final Note: Patient DC'd home with S/O    Provided Post Acute Provider List?: N/A  N/A Provider List Comment: Patient declined list and stated she does not anticipate any needs  Provided Post Acute Provider Quality & Resource List?: N/A  N/A Quality & Resource List Comment: Patient does not anticipate any needs         Transportation Services  Taxi: [employer]    Final Discharge Disposition Code: 01 - home or self-care

## 2020-05-18 NOTE — PROGRESS NOTES
Continued Stay Note  [facility]     Patient Name: Sameera Stapleton  MRN: [id number]  Today's Date: 5/18/2020    Admit Date: 5/14/2020    Discharge Plan     Row Name 05/18/20 1003       Plan    Plan  Home with S/O, needs cab voucher    Patient/Family in Agreement with Plan  yes    Plan Comments  Met with patient at the bedside. She plans to return to her S/O home at [address] [place] [address]. She denies havign any needs except she will need a cab voucher. CCP to provide cab voucher upon DC. Dyana Terry RN        Discharge Codes    No documentation.             Dyana Terry RN

## 2020-05-18 NOTE — PROGRESS NOTES
[facility]  Inpatient Progress Note    Reason for Follow Up: Anemia, cirrhosis    Subjective     Interval History: Barium study completed: Essentially negative for any right colonic abnormalities.  H&H relatively static, thrombocytopenia persists.  Patient seated at bedside, eating lunch, denies any GI related complaints.  Advised she needs follow-up in the office in 2 weeks time.      Current Facility-Administered Medications:   •  albuterol (PROVENTIL) nebulizer solution 0.083% 2.5 mg/3mL, 2.5 mg, Nebulization, Q6H PRN, Aaron Pandya MD  •  docusate sodium (COLACE) capsule 100 mg, 100 mg, Oral, Daily, Aaron Pandya MD, 100 mg at 05/18/20 0819  •  FLUoxetine (PROzac) capsule 20 mg, 20 mg, Oral, Daily, Aaron Pandya MD, 20 mg at 05/18/20 0819  •  lisinopril (PRINIVIL,ZESTRIL) tablet 40 mg, 40 mg, Oral, Daily, Aaron Pandya MD, 40 mg at 05/18/20 0819  •  pantoprazole (PROTONIX) EC tablet 40 mg, 40 mg, Oral, Daily, Aaron Pandya MD, 40 mg at 05/18/20 0819  Review of Systems:    All systems were reviewed and negative except for:  Gastrointestinal: positive for  See HPI    Objective     Vital Signs  Temp:  [98.3 °F (36.8 °C)-98.8 °F (37.1 °C)] 98.3 °F (36.8 °C)  Heart Rate:  [85-90] 90  Resp:  [16-18] 18  BP: (118-137)/(62-84) 134/73  There is no height or weight on file to calculate BMI.    Intake/Output Summary (Last 24 hours) at 5/18/2020 1205  Last data filed at 5/17/2020 1520  Gross per 24 hour   Intake 240 ml   Output --   Net 240 ml     No intake/output data recorded.     Physical Exam:   General: patient awake, alert and cooperative   Eyes: Normal lids and lashes, no scleral icterus   Neck: supple, normal ROM   Skin: warm and dry, not jaundiced   Cardiovascular: regular rhythm and rate, no murmurs auscultated   Pulm: clear to auscultation bilaterally, regular and unlabored   Abdomen: soft, nontender, nondistended; normal bowel sounds   Extremities: no rash or  edema   Psychiatric: Normal mood and behavior; memory intact     Results Review:     I reviewed the patient's new clinical results.    Results from last 7 days   Lab Units 05/18/20  0550 05/17/20  0856 05/16/20  0539   WBC 10*3/mm3 2.55* 3.15* 3.13*   HEMOGLOBIN g/dL 7.2* 7.8* 7.4*   HEMATOCRIT % 22.8* 25.4* 23.4*   PLATELETS 10*3/mm3 97* 122* 111*     Results from last 7 days   Lab Units 05/18/20  0550 05/17/20  0856 05/16/20  0539  05/14/20  1505   SODIUM mmol/L 141 139 139   < > 140   POTASSIUM mmol/L 3.6 3.3* 3.4*   < > 3.6   CHLORIDE mmol/L 106 105 106   < > 107   CO2 mmol/L 26.1 24.2 24.5   < > 26.2   BUN mg/dL 7 7 6   < > 6   CREATININE mg/dL 0.83 0.94 0.74   < > 0.92   CALCIUM mg/dL 7.7* 8.1* 7.9*   < > 7.9*   BILIRUBIN mg/dL 1.4* 1.8*  --   --  1.4*   ALK PHOS U/L 81 101  --   --  111   ALT (SGPT) U/L 16 19  --   --  19   AST (SGOT) U/L 42* 48*  --   --  47*   GLUCOSE mg/dL 96 135* 91   < > 110*    < > = values in this interval not displayed.     Results from last 7 days   Lab Units 05/16/20  0539 05/15/20  0559   INR  1.90* 1.87*     Lab Results   Lab Value Date/Time    LIPASE 70 01/28/2020 1607       Radiology:  FL Barium Enema Single Contrast   Final Result      CT Abdomen Pelvis With Contrast   Final Result          Assessment/Plan     Patient Active Problem List   Diagnosis   • GERD (gastroesophageal reflux disease)   • Hypertension   • Hyperlipidemia   • Type II diabetes mellitus (CMS/HCC)   • Encounter for long-term (current) use of medications   • Benign and innocent cardiac murmurs   • Class 3 severe obesity with serious comorbidity and body mass index (BMI) of 45.0 to 49.9 in adult (CMS/HCC)   • Adjustment disorder with mixed anxiety and depressed mood   • Liver failure without hepatic coma (CMS/HCC)   • Fatty liver with encephalopathy   • Cirrhosis (CMS/HCC)   • Absolute anemia   • Secondary biliary cirrhosis (CMS/HCC)       Assessment:  1. Cirrhosis: Suspect BELTRÁN  2. Iron deficiency anemia:  Negative endoscopic work-up  3. Hepatic encephalopathy  4. Morbid obesity  5. Coagulopathy  6. Type 2 diabetes      Plan:  · Continue current medical regimen  · Patient to call office to schedule follow-up in 2 weeks time with Dr. Pandya or nurse practitioner  I discussed the patients findings and my recommendations with patient.    Bryant Garcia MD

## 2020-05-19 ENCOUNTER — READMISSION MANAGEMENT (OUTPATIENT)
Dept: CALL CENTER | Facility: HOSPITAL | Age: 55
End: 2020-05-19

## 2020-05-19 LAB
A1AT SERPL-MCNC: 147 MG/DL (ref 101–187)
COPPER UR-MCNC: 4 UG/L
COPPER/CREAT UR: 14 UG/G CREAT (ref 0–49)
CREAT 24H UR-MCNC: 0.28 G/L (ref 0.3–3)
PHENOTYPE: NORMAL

## 2020-05-19 NOTE — OUTREACH NOTE
Prep Survey      Responses   [facility] patient discharged from?  [place]   Is LACE score < 7 ?  No   Eligibility  [facility]   Date of Admission  05/14/20   Date of Discharge  05/18/20   Discharge Disposition  Home or Self Care   Discharge diagnosis  HTN, DM II, severe obesity, Cirrhosis, absolute anemia, secondary biliary cirrhosis, s/p colonoscopy   COVID-19 Test Status  Negative   Does the patient have one of the following disease processes/diagnoses(primary or secondary)?  Other   Does the patient have Home health ordered?  No   Is there a DME ordered?  No   Comments regarding appointments  Pt to schedule F/U with PCP   Prep survey completed?  Yes          Inessa Montalvo RN

## 2020-05-20 ENCOUNTER — TRANSITIONAL CARE MANAGEMENT TELEPHONE ENCOUNTER (OUTPATIENT)
Dept: CALL CENTER | Facility: HOSPITAL | Age: 55
End: 2020-05-20

## 2020-05-20 ENCOUNTER — TELEPHONE (OUTPATIENT)
Dept: GASTROENTEROLOGY | Facility: CLINIC | Age: 55
End: 2020-05-20

## 2020-05-20 LAB
CYTO UR: NORMAL
LAB AP CASE REPORT: NORMAL
PATH REPORT.FINAL DX SPEC: NORMAL
PATH REPORT.GROSS SPEC: NORMAL

## 2020-05-20 NOTE — OUTREACH NOTE
Call Center TCM Note      Responses   [facility] patient discharged from?  [place]   COVID-19 Test Status  Negative   Does the patient have one of the following disease processes/diagnoses(primary or secondary)?  Other   TCM attempt successful?  No   Unsuccessful attempts  Attempt 1          Tammi Whitley RN    5/20/2020, 11:50

## 2020-05-20 NOTE — TELEPHONE ENCOUNTER
----- Message from Bibiana Smith MD sent at 5/20/2020 11:05 AM EDT -----  Can we put her on my schedule Shawna 3, thanks

## 2020-05-20 NOTE — OUTREACH NOTE
Call Center TCM Note      Responses   [facility] patient discharged from?  [place]   COVID-19 Test Status  Negative   Does the patient have one of the following disease processes/diagnoses(primary or secondary)?  Other   TCM attempt successful?  No   Unsuccessful attempts  Attempt 2          Tammi Whitley RN    5/20/2020, 13:33

## 2020-05-21 ENCOUNTER — TELEPHONE (OUTPATIENT)
Dept: FAMILY MEDICINE CLINIC | Facility: CLINIC | Age: 55
End: 2020-05-21

## 2020-05-21 ENCOUNTER — TRANSITIONAL CARE MANAGEMENT TELEPHONE ENCOUNTER (OUTPATIENT)
Dept: CALL CENTER | Facility: HOSPITAL | Age: 55
End: 2020-05-21

## 2020-05-21 NOTE — OUTREACH NOTE
Call Center TCM Note      Responses   [facility] patient discharged from?  [place]   COVID-19 Test Status  Negative   Does the patient have one of the following disease processes/diagnoses(primary or secondary)?  Other   TCM attempt successful?  Yes   Call start time  0915   Call end time  0919   Discharge diagnosis  HTN, DM II, severe obesity, Cirrhosis, absolute anemia, secondary biliary cirrhosis, s/p colonoscopy   Meds reviewed with patient/caregiver?  Yes   Is the patient having any side effects they believe may be caused by any medication additions or changes?  No   Does the patient have all medications ordered at discharge?  N/A   Is the patient taking all medications as directed (includes completed medication regime)?  Yes   Comments regarding appointments  Reminded patient to f/u with Dr. Pandya. She will schedule appt when she finds a ride.   Does the patient have a primary care provider?   Yes   Comments regarding PCP  Pt will call office to schedule appt.   Has the patient kept scheduled appointments due by today?  N/A   Pulse Ox monitoring  None   Psychosocial issues?  No   Did the patient receive a copy of their discharge instructions?  Yes   Nursing interventions  Reviewed instructions with patient   What is the patient's perception of their health status since discharge?  Improving   Is the patient/caregiver able to teach back signs and symptoms related to disease process for when to call PCP?  Yes   Is the patient/caregiver able to teach back signs and symptoms related to disease process for when to call 911?  Yes   Is the patient/caregiver able to teach back the hierarchy of who to call/visit for symptoms/problems? PCP, Specialist, Home health nurse, Urgent Care, ED, 911  Yes   If the patient is a current smoker, are they able to teach back resources for cessation?  -- [Nonsmoker]   TCM call completed?  Yes          Verna Bassett RN    5/21/2020, 09:19

## 2020-05-21 NOTE — PROGRESS NOTES
The most important thing is for the patient to follow-up with GI/hepatology soon.  She has an appointment scheduled with me in August but we could do a hospital follow-up by telephone or video visit next week

## 2020-05-21 NOTE — TELEPHONE ENCOUNTER
I left a message for the patient to see how she was doing since being discharged from the hospital. Dr. Roper said the main thing is for the patient to follow up with GI due to her new diagnosis of cirrhosis, but that she can do a telephone follow up with Dr. Roper next week. GI with Dr. Bibiana Smith office has tried contacting the patient to schedule a follow up, and left a message. Dr. Smith office number is [phone].

## 2020-05-22 ENCOUNTER — TELEPHONE (OUTPATIENT)
Dept: FAMILY MEDICINE CLINIC | Facility: CLINIC | Age: 55
End: 2020-05-22

## 2020-05-22 NOTE — TELEPHONE ENCOUNTER
Patient's boyfriend is calling to ask what patient can take for headache.  He states she has a really bad headache.      [facility] [address] confirmed    Patient call back [phone]

## 2020-05-22 NOTE — TELEPHONE ENCOUNTER
She may take 1 or 2 ibuprofen but that is all in a 24-hour timeframe. she should drink plenty of water that will help

## 2020-05-22 NOTE — TELEPHONE ENCOUNTER
Spoke with Chalino I instructed him to call the GI doctor to get a follow up scheduled while I was waiting to hear back from you. Please advise what she can take OTC for headache.

## 2020-05-28 ENCOUNTER — TELEPHONE (OUTPATIENT)
Dept: GASTROENTEROLOGY | Facility: CLINIC | Age: 55
End: 2020-05-28

## 2020-05-28 NOTE — TELEPHONE ENCOUNTER
----- Message from Aaron Pandya MD sent at 5/20/2020  9:18 AM EDT -----  Pathology benign  Office visit 2 weeks to discuss further work-up for anemia and cirrhosis  This can be with Dr. Smith or Nicole, physician choice

## 2020-05-29 ENCOUNTER — READMISSION MANAGEMENT (OUTPATIENT)
Dept: CALL CENTER | Facility: HOSPITAL | Age: 55
End: 2020-05-29

## 2020-06-02 ENCOUNTER — READMISSION MANAGEMENT (OUTPATIENT)
Dept: CALL CENTER | Facility: HOSPITAL | Age: 55
End: 2020-06-02

## 2020-06-02 NOTE — OUTREACH NOTE
Medical Week 2 Survey      Responses   [facility] patient discharged from?  [place]   COVID-19 Test Status  Negative   Does the patient have one of the following disease processes/diagnoses(primary or secondary)?  Other   Week 2 attempt successful?  No   Unsuccessful attempts  Attempt 2   Rescheduled  Revoked   Revoke  Decline to participate          Tosha Guzmán, RN

## 2020-06-05 ENCOUNTER — OFFICE VISIT (OUTPATIENT)
Dept: FAMILY MEDICINE CLINIC | Facility: CLINIC | Age: 55
End: 2020-06-05

## 2020-06-05 VITALS
OXYGEN SATURATION: 99 % | DIASTOLIC BLOOD PRESSURE: 78 MMHG | HEART RATE: 112 BPM | SYSTOLIC BLOOD PRESSURE: 126 MMHG | HEIGHT: 66 IN | WEIGHT: 293 LBS | TEMPERATURE: 97.8 F | BODY MASS INDEX: 47.09 KG/M2

## 2020-06-05 DIAGNOSIS — D64.9 ANEMIA, UNSPECIFIED TYPE: Primary | ICD-10-CM

## 2020-06-05 DIAGNOSIS — K72.90 LIVER FAILURE WITHOUT HEPATIC COMA, UNSPECIFIED CHRONICITY (HCC): ICD-10-CM

## 2020-06-05 PROCEDURE — 99213 OFFICE O/P EST LOW 20 MIN: CPT | Performed by: FAMILY MEDICINE

## 2020-06-05 NOTE — PROGRESS NOTES
Sreekanth Stapleton is a 54 y.o. female.     Chief Complaint   Patient presents with   • Cirrhosis   • Anemia       Patient is here today to follow-up from her hospital admission in May for cirrhosis and anemia.  She had a GI work-up in the hospital and underwent a upper and lower endoscopy.  There were small esophageal varices noted.  She was advised prior to that hospital admission to go off of metformin and her statin.  Her diagnosis of cirrhosis is a new finding that was found on an ER visit for mental status changes in April.  She has remained off of metformin and statin for about a month now.  She is taking lactulose and iron at home.  Patient states her blood sugars at home have been good- usually under 100.  Also her blood pressure has been well controlled.       Review of Systems   Constitutional: Negative for activity change, chills, fatigue and fever.   HENT: Negative for hearing loss, swollen glands, tinnitus and trouble swallowing.    Eyes: Negative for pain and visual disturbance.   Respiratory: Negative for cough and shortness of breath.    Cardiovascular: Negative for chest pain, palpitations and leg swelling.   Gastrointestinal: Negative for diarrhea and nausea.   Endocrine: Negative for polydipsia and polyuria.   Genitourinary: Negative for difficulty urinating and urinary incontinence.   Musculoskeletal: Negative for arthralgias, gait problem and joint swelling.   Skin: Negative for rash.   Allergic/Immunologic: Negative for immunocompromised state.   Neurological: Negative for dizziness, light-headedness and headache.   Hematological: Negative for adenopathy. Does not bruise/bleed easily.   Psychiatric/Behavioral: Negative for dysphoric mood and sleep disturbance.       The following portions of the patient's history were reviewed and updated as appropriate: allergies, current medications, past family history, past medical history, past social history, past surgical history and problem  list.    Past Medical History:   Diagnosis Date   • Adjustment disorder with mixed anxiety and depressed mood    • Allergic rhinitis    • Anemia    • Anemia    • Elevated LFTs    • Encounter for long-term (current) use of medications    • GERD (gastroesophageal reflux disease)    • Hyperlipidemia    • Hypertension    • Leukopenia    • Overweight    • Type II diabetes mellitus (CMS/HCC)        Past Surgical History:   Procedure Laterality Date   • COLONOSCOPY N/A 5/16/2020    Procedure: COLONOSCOPY TO ASCENDING COLON;  Surgeon: Aaron Pandya MD;  Location: [facility] ENDOSCOPY;  Service: Gastroenterology;  Laterality: N/A;  ANEMIA  --ABORTED AT ASCENDING COLON, INTERNAL HEMORRHOIDS    • ENDOSCOPY N/A 5/16/2020    Procedure: ESOPHAGOGASTRODUODENOSCOPY;  Surgeon: Aaron Pandya MD;  Location: [facility] ENDOSCOPY;  Service: Gastroenterology;  Laterality: N/A;  ANEMIA, BELTRÁN, CIRRHOSIS   --GRADE 1 VARICES        Family History   Problem Relation Age of Onset   • Diabetes Other    • Hypertension Other        Social History     Socioeconomic History   • Marital status: Single     Spouse name: Not on file   • Number of children: Not on file   • Years of education: Not on file   • Highest education level: Not on file   Tobacco Use   • Smoking status: Never Smoker   Substance and Sexual Activity   • Alcohol use: No     Frequency: Never         Current Outpatient Medications:   •  albuterol sulfate  (90 Base) MCG/ACT inhaler, Inhale 2 puffs Every 4 (Four) Hours As Needed for Wheezing., Disp: 1 inhaler, Rfl: 0  •  docusate sodium (COLACE) 100 MG capsule, Take 1 capsule by mouth Daily., Disp: 90 capsule, Rfl: 3  •  FEROSUL 325 (65 Fe) MG tablet, TAKE ONE TABLET BY MOUTH ONCE DAILY WITH FOOD FOR ANEMIA. TAKE WITH STOOL SOFTENER AS NEEDED, Disp: 30 tablet, Rfl: 10  •  FLUoxetine (PROzac) 20 MG capsule, Take 1 capsule by mouth Daily., Disp: 90 capsule, Rfl: 0  •  FREESTYLE LITE test strip, 1 each by Other route Daily., Disp: ,  "Rfl: 6  •  Lancets (FREESTYLE) lancets, , Disp: , Rfl:   •  lisinopril (PRINIVIL,ZESTRIL) 40 MG tablet, Take 1 tablet by mouth Daily., Disp: 90 tablet, Rfl: 1  •  loratadine (CLARITIN) 10 MG tablet, TAKE 1 TABLET BY MOUTH EVERY DAY AS NEEDED FOR ALLERGIES, Disp: 90 tablet, Rfl: 2  •  pantoprazole (PROTONIX) 40 MG EC tablet, Take 1 tablet by mouth Daily., Disp: 90 tablet, Rfl: 1    Objective     Vitals:    06/05/20 1344   BP: 126/78   Pulse: 112   Temp: 97.8 °F (36.6 °C)   SpO2: 99%   Weight: 136 kg (300 lb)   Height: 167.6 cm (66\")       Body mass index is 48.42 kg/m².    No components found for: 2D    Physical Exam   Constitutional: She is oriented to person, place, and time. She appears well-developed and well-nourished. She is obese.  HENT:   Head: Normocephalic and atraumatic.   Eyes: Conjunctivae are normal.   Neck: Normal range of motion. Neck supple.   Cardiovascular: Normal rate, regular rhythm, normal heart sounds and intact distal pulses.   Pulmonary/Chest: Effort normal and breath sounds normal.   Abdominal: Soft. Bowel sounds are normal. She exhibits distension. There is no tenderness.   Abdomen is rotund   Musculoskeletal: Normal range of motion. She exhibits no edema.   Neurological: She is alert and oriented to person, place, and time.   Skin: Skin is warm and dry. Capillary refill takes less than 2 seconds. No rash noted.   Psychiatric: She has a normal mood and affect. Her behavior is normal. Judgment and thought content normal.   Nursing note and vitals reviewed.      Procedures    Assessment/Plan   Sameera was seen today for cirrhosis and anemia.    Diagnoses and all orders for this visit:    Anemia, unspecified type  -     CBC & Differential    Liver failure without hepatic coma, unspecified chronicity (CMS/HCC)  -     Comprehensive Metabolic Panel  -     Ammonia    Patient will continue taking iron every day as well as lactulose.  She has a follow-up appointment with GI on June 22.  I suspect they " will start spironolactone if appropriate.    Surveillance labs were obtained today and any medication changes will be made based on lab results and will be called to the patient later this week.    Patient Instructions   Cirrhosis    Cirrhosis is long-term (chronic) liver injury. The liver is the body's largest internal organ, and it performs many functions. It converts food into energy, removes toxic material from the blood, makes important proteins, and absorbs necessary vitamins from food.  In cirrhosis, healthy liver cells are replaced by scar tissue. This prevents blood from flowing through the liver, making it difficult for the liver to function. Scarring of the liver cannot be reversed, but treatment can prevent it from getting worse.  What are the causes?  Common causes of this condition are hepatitis C and long-term alcohol abuse. Other causes include:  · Nonalcoholic fatty liver disease. This happens when fat is deposited in the liver by causes other than alcohol.  · Hepatitis B infection.  · Autoimmune hepatitis. In this condition, the body's defense system (immune system) mistakenly attacks the liver cells, causing irritation and swelling (inflammation).  · Diseases that cause blockage of ducts inside the liver.  · Inherited liver diseases, such as hemochromatosis. This is one of the most common inherited liver diseases. In this disease, deposits of iron collect in the liver and other organs.  · Reactions to certain long-term medicines, such as amiodarone, a heart medicine.  · Parasitic infections. These include schistosomiasis, which is caused by a flatworm.  · Long-term contact to certain toxins. These toxins include certain organic solvents, such as toluene and chloroform.  What increases the risk?  You are more likely to develop this condition if:  · You have certain types of viral hepatitis.  · You abuse alcohol, especially if you are female.  · You are overweight.  · You share needles.  · You have  unprotected sex with someone who has viral hepatitis.  What are the signs or symptoms?  You may not have any signs and symptoms at first. Symptoms may not develop until the damage to your liver starts to get worse.  Early symptoms may include:  · Weakness and tiredness (fatigue).  · Changes in sleep patterns or having trouble sleeping.  · Itchiness.  · Tenderness in the right-upper part of your abdomen.  · Weight loss and muscle loss.  · Nausea.  · Loss of appetite.  · Appearance of tiny blood vessels under the skin.  Later symptoms may include:  · Fatigue or weakness that is getting worse.  · Yellow skin and eyes (jaundice).  · Buildup of fluid in the abdomen (ascites). You may notice that your clothes are tight around your waist.  · Weight gain.  · Swelling of the feet and ankles (edema).  · Trouble breathing.  · Easy bruising and bleeding.  · Vomiting blood.  · Black or bloody stool.  · Mental confusion.  How is this diagnosed?  Your health care provider may suspect cirrhosis based on your symptoms and medical history, especially if you have other medical conditions or a history of alcohol abuse. Your health care provider will do a physical exam to feel your liver and to check for signs of cirrhosis. He or she may perform other tests, including:  · Blood tests to check:  ? For hepatitis B or C.  ? Kidney function.  ? Liver function.  · Imaging tests such as:  ? MRI or CT scan to look for changes seen in advanced cirrhosis.  ? Ultrasound to see if normal liver tissue is being replaced by scar tissue.  · A procedure in which a long needle is used to take a sample of liver tissue to be checked in a lab (biopsy). Liver biopsy can confirm the diagnosis of cirrhosis.  How is this treated?  Treatment for this condition depends on how damaged your liver is and what caused the damage. It may include treating the symptoms of cirrhosis, or treating the underlying causes in order to slow the damage. Treatment may  include:  · Making lifestyle changes, such as:  ? Eating a healthy diet. You may need to work with your health care provider or a diet and nutrition specialist (dietitian) to develop an eating plan.  ? Restricting salt intake.  ? Maintaining a healthy weight.  ? Not abusing drugs or alcohol.  · Taking medicines to:  ? Treat liver infections or other infections.  ? Control itching.  ? Reduce fluid buildup.  ? Reduce certain blood toxins.  ? Reduce risk of bleeding from enlarged blood vessels in the stomach or esophagus (varices).  · Liver transplant. In this procedure, a liver from a donor is used to replace your diseased liver. This is done if cirrhosis has caused liver failure.  Other treatments and procedures may be done depending on the problems that you get from cirrhosis. Common problems include liver-related kidney failure (hepatorenal syndrome).  Follow these instructions at home:    · Take medicines only as told by your health care provider. Do not use medicines that are toxic to your liver. Ask your health care provider before taking any new medicines, including over-the-counter medicines.  · Rest as needed.  · Eat a well-balanced diet. Ask your health care provider or dietitian for more information.  · Limit your salt or water intake, if your health care provider asks you to do this.  · Do not drink alcohol. This is especially important if you are taking acetaminophen.  · Keep all follow-up visits as told by your health care provider. This is important.  Contact a health care provider if you:  · Have fatigue or weakness that is getting worse.  · Develop swelling of the hands, feet, legs, or face.  · Have a fever.  · Develop loss of appetite.  · Have nausea or vomiting.  · Develop jaundice.  · Develop easy bruising or bleeding.  Get help right away if you:  · Vomit bright red blood or a material that looks like coffee grounds.  · Have blood in your stools.  · Notice that your stools appear black and  tarry.  · Become confused.  · Have chest pain or trouble breathing.  Summary  · Cirrhosis is chronic liver injury. Liver damage cannot be reversed. Common causes are hepatitis C and long-term alcohol abuse.  · Tests used to diagnose cirrhosis include blood tests, imaging tests, and liver biopsy.  · Treatment for this condition involves treating the underlying cause. Avoid alcohol, drugs, salt, and medicines that may damage your liver.  · Contact your health care provider if you develop ascites, edema, jaundice, fever, nausea or vomiting, easy bruising or bleeding, or worsening fatigue.  This information is not intended to replace advice given to you by your health care provider. Make sure you discuss any questions you have with your health care provider.  Document Released: 12/18/2006 Document Revised: 04/08/2020 Document Reviewed: 11/07/2018  Elsevier Patient Education © 2020 [employer]

## 2020-06-05 NOTE — PATIENT INSTRUCTIONS
Cirrhosis    Cirrhosis is long-term (chronic) liver injury. The liver is the body's largest internal organ, and it performs many functions. It converts food into energy, removes toxic material from the blood, makes important proteins, and absorbs necessary vitamins from food.  In cirrhosis, healthy liver cells are replaced by scar tissue. This prevents blood from flowing through the liver, making it difficult for the liver to function. Scarring of the liver cannot be reversed, but treatment can prevent it from getting worse.  What are the causes?  Common causes of this condition are hepatitis C and long-term alcohol abuse. Other causes include:  · Nonalcoholic fatty liver disease. This happens when fat is deposited in the liver by causes other than alcohol.  · Hepatitis B infection.  · Autoimmune hepatitis. In this condition, the body's defense system (immune system) mistakenly attacks the liver cells, causing irritation and swelling (inflammation).  · Diseases that cause blockage of ducts inside the liver.  · Inherited liver diseases, such as hemochromatosis. This is one of the most common inherited liver diseases. In this disease, deposits of iron collect in the liver and other organs.  · Reactions to certain long-term medicines, such as amiodarone, a heart medicine.  · Parasitic infections. These include schistosomiasis, which is caused by a flatworm.  · Long-term contact to certain toxins. These toxins include certain organic solvents, such as toluene and chloroform.  What increases the risk?  You are more likely to develop this condition if:  · You have certain types of viral hepatitis.  · You abuse alcohol, especially if you are female.  · You are overweight.  · You share needles.  · You have unprotected sex with someone who has viral hepatitis.  What are the signs or symptoms?  You may not have any signs and symptoms at first. Symptoms may not develop until the damage to your liver starts to get worse.  Early  symptoms may include:  · Weakness and tiredness (fatigue).  · Changes in sleep patterns or having trouble sleeping.  · Itchiness.  · Tenderness in the right-upper part of your abdomen.  · Weight loss and muscle loss.  · Nausea.  · Loss of appetite.  · Appearance of tiny blood vessels under the skin.  Later symptoms may include:  · Fatigue or weakness that is getting worse.  · Yellow skin and eyes (jaundice).  · Buildup of fluid in the abdomen (ascites). You may notice that your clothes are tight around your waist.  · Weight gain.  · Swelling of the feet and ankles (edema).  · Trouble breathing.  · Easy bruising and bleeding.  · Vomiting blood.  · Black or bloody stool.  · Mental confusion.  How is this diagnosed?  Your health care provider may suspect cirrhosis based on your symptoms and medical history, especially if you have other medical conditions or a history of alcohol abuse. Your health care provider will do a physical exam to feel your liver and to check for signs of cirrhosis. He or she may perform other tests, including:  · Blood tests to check:  ? For hepatitis B or C.  ? Kidney function.  ? Liver function.  · Imaging tests such as:  ? MRI or CT scan to look for changes seen in advanced cirrhosis.  ? Ultrasound to see if normal liver tissue is being replaced by scar tissue.  · A procedure in which a long needle is used to take a sample of liver tissue to be checked in a lab (biopsy). Liver biopsy can confirm the diagnosis of cirrhosis.  How is this treated?  Treatment for this condition depends on how damaged your liver is and what caused the damage. It may include treating the symptoms of cirrhosis, or treating the underlying causes in order to slow the damage. Treatment may include:  · Making lifestyle changes, such as:  ? Eating a healthy diet. You may need to work with your health care provider or a diet and nutrition specialist (dietitian) to develop an eating plan.  ? Restricting salt  intake.  ? Maintaining a healthy weight.  ? Not abusing drugs or alcohol.  · Taking medicines to:  ? Treat liver infections or other infections.  ? Control itching.  ? Reduce fluid buildup.  ? Reduce certain blood toxins.  ? Reduce risk of bleeding from enlarged blood vessels in the stomach or esophagus (varices).  · Liver transplant. In this procedure, a liver from a donor is used to replace your diseased liver. This is done if cirrhosis has caused liver failure.  Other treatments and procedures may be done depending on the problems that you get from cirrhosis. Common problems include liver-related kidney failure (hepatorenal syndrome).  Follow these instructions at home:    · Take medicines only as told by your health care provider. Do not use medicines that are toxic to your liver. Ask your health care provider before taking any new medicines, including over-the-counter medicines.  · Rest as needed.  · Eat a well-balanced diet. Ask your health care provider or dietitian for more information.  · Limit your salt or water intake, if your health care provider asks you to do this.  · Do not drink alcohol. This is especially important if you are taking acetaminophen.  · Keep all follow-up visits as told by your health care provider. This is important.  Contact a health care provider if you:  · Have fatigue or weakness that is getting worse.  · Develop swelling of the hands, feet, legs, or face.  · Have a fever.  · Develop loss of appetite.  · Have nausea or vomiting.  · Develop jaundice.  · Develop easy bruising or bleeding.  Get help right away if you:  · Vomit bright red blood or a material that looks like coffee grounds.  · Have blood in your stools.  · Notice that your stools appear black and tarry.  · Become confused.  · Have chest pain or trouble breathing.  Summary  · Cirrhosis is chronic liver injury. Liver damage cannot be reversed. Common causes are hepatitis C and long-term alcohol abuse.  · Tests used to  diagnose cirrhosis include blood tests, imaging tests, and liver biopsy.  · Treatment for this condition involves treating the underlying cause. Avoid alcohol, drugs, salt, and medicines that may damage your liver.  · Contact your health care provider if you develop ascites, edema, jaundice, fever, nausea or vomiting, easy bruising or bleeding, or worsening fatigue.  This information is not intended to replace advice given to you by your health care provider. Make sure you discuss any questions you have with your health care provider.  Document Released: 12/18/2006 Document Revised: 04/08/2020 Document Reviewed: 11/07/2018  Elsevier Patient Education © 2020 [employer]

## 2020-06-06 LAB
ALBUMIN SERPL-MCNC: 2.7 G/DL (ref 3.8–4.9)
ALBUMIN/GLOB SERPL: 0.6 {RATIO} (ref 1.2–2.2)
ALP SERPL-CCNC: 112 IU/L (ref 39–117)
ALT SERPL-CCNC: 16 IU/L (ref 0–32)
AMMONIA PLAS-MCNC: 168 UG/DL (ref 34–178)
AST SERPL-CCNC: 36 IU/L (ref 0–40)
BASOPHILS # BLD AUTO: 0 X10E3/UL (ref 0–0.2)
BASOPHILS NFR BLD AUTO: 1 %
BILIRUB SERPL-MCNC: 1.3 MG/DL (ref 0–1.2)
BUN SERPL-MCNC: 6 MG/DL (ref 6–24)
BUN/CREAT SERPL: 8 (ref 9–23)
CALCIUM SERPL-MCNC: 8.4 MG/DL (ref 8.7–10.2)
CHLORIDE SERPL-SCNC: 107 MMOL/L (ref 96–106)
CO2 SERPL-SCNC: 23 MMOL/L (ref 20–29)
CREAT SERPL-MCNC: 0.76 MG/DL (ref 0.57–1)
EOSINOPHIL # BLD AUTO: 0.3 X10E3/UL (ref 0–0.4)
EOSINOPHIL NFR BLD AUTO: 9 %
ERYTHROCYTE [DISTWIDTH] IN BLOOD BY AUTOMATED COUNT: 15.7 % (ref 11.7–15.4)
GLOBULIN SER CALC-MCNC: 4.3 G/DL (ref 1.5–4.5)
GLUCOSE SERPL-MCNC: 92 MG/DL (ref 65–99)
HCT VFR BLD AUTO: 24.9 % (ref 34–46.6)
HGB BLD-MCNC: 7.8 G/DL (ref 11.1–15.9)
IMM GRANULOCYTES # BLD AUTO: 0 X10E3/UL (ref 0–0.1)
IMM GRANULOCYTES NFR BLD AUTO: 0 %
LYMPHOCYTES # BLD AUTO: 0.9 X10E3/UL (ref 0.7–3.1)
LYMPHOCYTES NFR BLD AUTO: 27 %
MCH RBC QN AUTO: 25.8 PG (ref 26.6–33)
MCHC RBC AUTO-ENTMCNC: 31.3 G/DL (ref 31.5–35.7)
MCV RBC AUTO: 83 FL (ref 79–97)
MONOCYTES # BLD AUTO: 0.3 X10E3/UL (ref 0.1–0.9)
MONOCYTES NFR BLD AUTO: 10 %
MORPHOLOGY BLD-IMP: ABNORMAL
NEUTROPHILS # BLD AUTO: 1.8 X10E3/UL (ref 1.4–7)
NEUTROPHILS NFR BLD AUTO: 53 %
PLATELET # BLD AUTO: 128 X10E3/UL (ref 150–450)
POTASSIUM SERPL-SCNC: 3.8 MMOL/L (ref 3.5–5.2)
PROT SERPL-MCNC: 7 G/DL (ref 6–8.5)
RBC # BLD AUTO: 3.02 X10E6/UL (ref 3.77–5.28)
SODIUM SERPL-SCNC: 143 MMOL/L (ref 134–144)
WBC # BLD AUTO: 3.3 X10E3/UL (ref 3.4–10.8)

## 2020-06-08 RX ORDER — BLOOD-GLUCOSE METER
KIT MISCELLANEOUS
Qty: 100 EACH | Refills: 2 | Status: SHIPPED | OUTPATIENT
Start: 2020-06-08 | End: 2020-08-05 | Stop reason: SDUPTHER

## 2020-06-10 DIAGNOSIS — I10 ESSENTIAL HYPERTENSION: ICD-10-CM

## 2020-06-10 RX ORDER — LISINOPRIL 40 MG/1
TABLET ORAL
Qty: 90 TABLET | Refills: 1 | Status: SHIPPED | OUTPATIENT
Start: 2020-06-10 | End: 2020-11-20

## 2020-06-26 DIAGNOSIS — R06.2 WHEEZING: ICD-10-CM

## 2020-06-26 RX ORDER — ALBUTEROL SULFATE 90 UG/1
AEROSOL, METERED RESPIRATORY (INHALATION)
Qty: 18 G | Refills: 2 | Status: SHIPPED | OUTPATIENT
Start: 2020-06-26

## 2020-07-09 ENCOUNTER — OFFICE VISIT (OUTPATIENT)
Dept: FAMILY MEDICINE CLINIC | Facility: CLINIC | Age: 55
End: 2020-07-09

## 2020-07-09 VITALS
TEMPERATURE: 98.6 F | HEART RATE: 100 BPM | OXYGEN SATURATION: 100 % | WEIGHT: 293 LBS | SYSTOLIC BLOOD PRESSURE: 124 MMHG | DIASTOLIC BLOOD PRESSURE: 72 MMHG | HEIGHT: 66 IN | BODY MASS INDEX: 47.09 KG/M2

## 2020-07-09 DIAGNOSIS — Z79.899 ENCOUNTER FOR LONG-TERM (CURRENT) USE OF MEDICATIONS: ICD-10-CM

## 2020-07-09 DIAGNOSIS — K72.90 LIVER FAILURE WITHOUT HEPATIC COMA, UNSPECIFIED CHRONICITY (HCC): Primary | ICD-10-CM

## 2020-07-09 DIAGNOSIS — K76.0: ICD-10-CM

## 2020-07-09 DIAGNOSIS — D64.9 ANEMIA, UNSPECIFIED TYPE: ICD-10-CM

## 2020-07-09 DIAGNOSIS — G93.40: ICD-10-CM

## 2020-07-09 PROCEDURE — 99213 OFFICE O/P EST LOW 20 MIN: CPT | Performed by: FAMILY MEDICINE

## 2020-07-09 RX ORDER — LACTULOSE 10 G/15ML
SOLUTION ORAL; RECTAL
COMMUNITY
Start: 2020-06-08 | End: 2020-07-24 | Stop reason: SDUPTHER

## 2020-07-09 NOTE — PROGRESS NOTES
Sreekanth Stapleton is a 55 y.o. female.     Chief Complaint   Patient presents with   • Anemia     follow up, pt NS her appt with leola   • Liver Follow-up       Patient is here today to follow-up on cirrhosis and anemia.  She had a GI work-up in the hospital and underwent a upper and lower endoscopy.  There were small esophageal varices noted.  She has remained off of metformin and her statin.  Her diagnosis of cirrhosis is a new finding that was found on an ER visit for mental status changes in April.  She is taking lactulose and iron at home.  Patient states her blood sugars at home have been good- usually under 100.  Also her blood pressure has been well controlled.  She states she is feeling well and denies any fatigue.  She also denies any confusion.  She unfortunately was unable to keep her appointment with Dr. Glover, MOHAN, on June 22.  However she does plan on rescheduling that appointment soon.       Review of Systems   Constitutional: Negative for activity change, chills, fatigue and fever.   HENT: Negative for hearing loss, swollen glands, tinnitus and trouble swallowing.    Eyes: Negative for pain and visual disturbance.   Respiratory: Negative for cough and shortness of breath.    Cardiovascular: Negative for chest pain, palpitations and leg swelling.   Gastrointestinal: Negative for diarrhea and nausea.   Endocrine: Negative for polydipsia and polyuria.   Genitourinary: Negative for difficulty urinating and urinary incontinence.   Musculoskeletal: Negative for arthralgias, gait problem and joint swelling.   Skin: Negative for rash.   Allergic/Immunologic: Negative for immunocompromised state.   Neurological: Negative for dizziness, light-headedness and headache.   Hematological: Negative for adenopathy. Does not bruise/bleed easily.   Psychiatric/Behavioral: Negative for dysphoric mood and sleep disturbance.       The following portions of the patient's history were reviewed and updated  as appropriate: allergies, current medications, past family history, past medical history, past social history, past surgical history and problem list.    Past Medical History:   Diagnosis Date   • Adjustment disorder with mixed anxiety and depressed mood    • Allergic rhinitis    • Anemia    • Anemia    • Elevated LFTs    • Encounter for long-term (current) use of medications    • GERD (gastroesophageal reflux disease)    • Hyperlipidemia    • Hypertension    • Leukopenia    • Overweight    • Type II diabetes mellitus (CMS/HCC)        Past Surgical History:   Procedure Laterality Date   • COLONOSCOPY N/A 5/16/2020    Procedure: COLONOSCOPY TO ASCENDING COLON;  Surgeon: Aaron Pandya MD;  Location: [facility] ENDOSCOPY;  Service: Gastroenterology;  Laterality: N/A;  ANEMIA  --ABORTED AT ASCENDING COLON, INTERNAL HEMORRHOIDS    • ENDOSCOPY N/A 5/16/2020    Procedure: ESOPHAGOGASTRODUODENOSCOPY;  Surgeon: Aaron Pandya MD;  Location: [facility] ENDOSCOPY;  Service: Gastroenterology;  Laterality: N/A;  ANEMIA, BELTRÁN, CIRRHOSIS   --GRADE 1 VARICES        Family History   Problem Relation Age of Onset   • Diabetes Other    • Hypertension Other        Social History     Socioeconomic History   • Marital status: Single     Spouse name: Not on file   • Number of children: Not on file   • Years of education: Not on file   • Highest education level: Not on file   Tobacco Use   • Smoking status: Never Smoker   Substance and Sexual Activity   • Alcohol use: No     Frequency: Never         Current Outpatient Medications:   •  ALBUTEROL SULFATE  (90 Base) MCG/ACT inhaler, INHALE 2 PUFFS BY MOUTH EVERY 4 HOURS AS NEEDED FOR WHEEZING, Disp: 18 g, Rfl: 2  •  docusate sodium (COLACE) 100 MG capsule, Take 1 capsule by mouth Daily., Disp: 90 capsule, Rfl: 3  •  ENULOSE 10 GM/15ML solution solution (encephalopathy), , Disp: , Rfl:   •  FEROSUL 325 (65 Fe) MG tablet, TAKE ONE TABLET BY MOUTH ONCE DAILY WITH FOOD FOR ANEMIA. TAKE WITH  "STOOL SOFTENER AS NEEDED, Disp: 30 tablet, Rfl: 10  •  FLUoxetine (PROzac) 20 MG capsule, Take 1 capsule by mouth Daily., Disp: 90 capsule, Rfl: 0  •  FREESTYLE LITE test strip, USE ONE STRIP TO CHECK GLUCOSE ONCE DAILY, Disp: 100 each, Rfl: 2  •  Lancets (FREESTYLE) lancets, , Disp: , Rfl:   •  lisinopril (PRINIVIL,ZESTRIL) 40 MG tablet, TAKE 1 TABLET BY MOUTH EVERY DAY, Disp: 90 tablet, Rfl: 1  •  loratadine (CLARITIN) 10 MG tablet, TAKE 1 TABLET BY MOUTH EVERY DAY AS NEEDED FOR ALLERGIES, Disp: 90 tablet, Rfl: 2  •  pantoprazole (PROTONIX) 40 MG EC tablet, Take 1 tablet by mouth Daily., Disp: 90 tablet, Rfl: 1    Objective     Vitals:    07/09/20 1352   BP: 124/72   Pulse: 100   Temp: 98.6 °F (37 °C)   SpO2: 100%   Weight: (!) 138 kg (303 lb 12.8 oz)   Height: 167.6 cm (66\")       Body mass index is 49.03 kg/m².    No components found for: 2D    Physical Exam   Constitutional: She is oriented to person, place, and time. She appears well-developed and well-nourished.   HENT:   Head: Normocephalic and atraumatic.   Eyes: Conjunctivae are normal.   Neck: Normal range of motion. Neck supple.   Cardiovascular: Normal rate, regular rhythm, normal heart sounds and intact distal pulses.   Pulmonary/Chest: Effort normal and breath sounds normal.   Abdominal: Soft. Bowel sounds are normal.   Musculoskeletal: Normal range of motion. She exhibits no edema.   Neurological: She is alert and oriented to person, place, and time.   Skin: Skin is warm and dry. Capillary refill takes less than 2 seconds. No rash noted.   Psychiatric: She has a normal mood and affect. Her behavior is normal. Judgment and thought content normal.   Nursing note and vitals reviewed.      Procedures    Assessment/Plan   Sameera was seen today for anemia and liver follow-up.    Diagnoses and all orders for this visit:    Liver failure without hepatic coma, unspecified chronicity (CMS/HCC)  -     Comprehensive Metabolic Panel    Fatty liver with " encephalopathy  -     Comprehensive Metabolic Panel    Anemia, unspecified type  -     CBC & Differential    Encounter for long-term (current) use of medications  -     CBC & Differential  -     Comprehensive Metabolic Panel    Surveillance labs were obtained today and any medication changes will be made based on lab results and will be called to the patient later this week.   Patient will contact Dr. Glover's office and reschedule appointment as soon as possible.    There are no Patient Instructions on file for this visit.

## 2020-07-10 LAB
ALBUMIN SERPL-MCNC: 2.7 G/DL (ref 3.8–4.9)
ALBUMIN/GLOB SERPL: 0.6 {RATIO} (ref 1.2–2.2)
ALP SERPL-CCNC: 119 IU/L (ref 39–117)
ALT SERPL-CCNC: 12 IU/L (ref 0–32)
AST SERPL-CCNC: 34 IU/L (ref 0–40)
BASOPHILS # BLD AUTO: 0 X10E3/UL (ref 0–0.2)
BASOPHILS NFR BLD AUTO: 1 %
BILIRUB SERPL-MCNC: 1.4 MG/DL (ref 0–1.2)
BUN SERPL-MCNC: 8 MG/DL (ref 6–24)
BUN/CREAT SERPL: 12 (ref 9–23)
CALCIUM SERPL-MCNC: 8.4 MG/DL (ref 8.7–10.2)
CHLORIDE SERPL-SCNC: 104 MMOL/L (ref 96–106)
CO2 SERPL-SCNC: 27 MMOL/L (ref 20–29)
CREAT SERPL-MCNC: 0.65 MG/DL (ref 0.57–1)
EOSINOPHIL # BLD AUTO: 0.3 X10E3/UL (ref 0–0.4)
EOSINOPHIL NFR BLD AUTO: 8 %
ERYTHROCYTE [DISTWIDTH] IN BLOOD BY AUTOMATED COUNT: 21.5 % (ref 11.7–15.4)
GLOBULIN SER CALC-MCNC: 4.2 G/DL (ref 1.5–4.5)
GLUCOSE SERPL-MCNC: 137 MG/DL (ref 65–99)
HCT VFR BLD AUTO: 32.2 % (ref 34–46.6)
HGB BLD-MCNC: 10.3 G/DL (ref 11.1–15.9)
IMM GRANULOCYTES # BLD AUTO: 0 X10E3/UL (ref 0–0.1)
IMM GRANULOCYTES NFR BLD AUTO: 0 %
LYMPHOCYTES # BLD AUTO: 0.8 X10E3/UL (ref 0.7–3.1)
LYMPHOCYTES NFR BLD AUTO: 25 %
MCH RBC QN AUTO: 29.4 PG (ref 26.6–33)
MCHC RBC AUTO-ENTMCNC: 32 G/DL (ref 31.5–35.7)
MCV RBC AUTO: 92 FL (ref 79–97)
MONOCYTES # BLD AUTO: 0.3 X10E3/UL (ref 0.1–0.9)
MONOCYTES NFR BLD AUTO: 10 %
NEUTROPHILS # BLD AUTO: 1.7 X10E3/UL (ref 1.4–7)
NEUTROPHILS NFR BLD AUTO: 56 %
PLATELET # BLD AUTO: 105 X10E3/UL (ref 150–450)
POTASSIUM SERPL-SCNC: 4.1 MMOL/L (ref 3.5–5.2)
PROT SERPL-MCNC: 6.9 G/DL (ref 6–8.5)
RBC # BLD AUTO: 3.5 X10E6/UL (ref 3.77–5.28)
SODIUM SERPL-SCNC: 142 MMOL/L (ref 134–144)
WBC # BLD AUTO: 3.1 X10E3/UL (ref 3.4–10.8)

## 2020-07-23 NOTE — TELEPHONE ENCOUNTER
PATIENT IS NEEDING A REFILL ON THE ENULOSE HOWEVER IT LOOKS LIKE SHE NSH ANOTHER APPOINTMENT WITH GASTRO. PLEASE ADVISE.

## 2020-07-24 RX ORDER — LACTULOSE 10 G/15ML
20 SOLUTION ORAL; RECTAL DAILY
Qty: 473 ML | Refills: 1 | Status: SHIPPED | OUTPATIENT
Start: 2020-07-24 | End: 2020-08-24

## 2020-07-30 ENCOUNTER — TELEPHONE (OUTPATIENT)
Dept: FAMILY MEDICINE CLINIC | Facility: CLINIC | Age: 55
End: 2020-07-30

## 2020-07-30 NOTE — TELEPHONE ENCOUNTER
Chalino on [facility] called stated that with the Enulose patient gets very weak. She is doing 30ml daily. Chalino didn't know if every 3rd or 4th day she could skip a dose since it makes her so weak. Please advise.

## 2020-08-06 ENCOUNTER — OFFICE VISIT (OUTPATIENT)
Dept: FAMILY MEDICINE CLINIC | Facility: CLINIC | Age: 55
End: 2020-08-06

## 2020-08-06 VITALS
WEIGHT: 293 LBS | HEART RATE: 95 BPM | SYSTOLIC BLOOD PRESSURE: 130 MMHG | DIASTOLIC BLOOD PRESSURE: 64 MMHG | TEMPERATURE: 97.8 F | HEIGHT: 66 IN | BODY MASS INDEX: 47.09 KG/M2 | OXYGEN SATURATION: 99 %

## 2020-08-06 DIAGNOSIS — K72.90 LIVER FAILURE WITHOUT HEPATIC COMA, UNSPECIFIED CHRONICITY (HCC): Primary | ICD-10-CM

## 2020-08-06 PROCEDURE — 99213 OFFICE O/P EST LOW 20 MIN: CPT | Performed by: FAMILY MEDICINE

## 2020-08-06 NOTE — PROGRESS NOTES
Sreekanth Stapleton is a 55 y.o. female.     Chief Complaint   Patient presents with   • Follow-up     [facility] 08/04 vomiting       Patient is here today to follow-up from her emergency room visit and admission on August 4, 2020.  She was admitted to the hospital for altered mental mental status.  Her ammonia level was found to be 225.  She was given lactulose enemas and eventually her ammonia level came down to 53.  The patient's mental status normalized and she was released the next day.  She has still not had the opportunity to follow-up with a hepatologist due to transportation issues.  She is here today with her sister.  She states that she is feeling back to normal.  She denies any pain and her weight has been stable.       Review of Systems   Constitutional: Negative for activity change, chills, fatigue and fever.   HENT: Negative for hearing loss, swollen glands, tinnitus and trouble swallowing.    Eyes: Negative for pain and visual disturbance.   Respiratory: Negative for cough and shortness of breath.    Cardiovascular: Negative for chest pain, palpitations and leg swelling.   Gastrointestinal: Negative for diarrhea and nausea.   Endocrine: Negative for polydipsia and polyuria.   Genitourinary: Negative for difficulty urinating and urinary incontinence.   Musculoskeletal: Negative for arthralgias, gait problem and joint swelling.   Skin: Negative for rash.   Allergic/Immunologic: Negative for immunocompromised state.   Neurological: Negative for dizziness, light-headedness and headache.   Hematological: Negative for adenopathy. Does not bruise/bleed easily.   Psychiatric/Behavioral: Negative for dysphoric mood and sleep disturbance.       The following portions of the patient's history were reviewed and updated as appropriate: allergies, current medications, past family history, past medical history, past social history, past surgical history and problem list.    Past Medical History:   Diagnosis Date   •  Adjustment disorder with mixed anxiety and depressed mood    • Allergic rhinitis    • Anemia    • Anemia    • Elevated LFTs    • Encounter for long-term (current) use of medications    • GERD (gastroesophageal reflux disease)    • Hyperlipidemia    • Hypertension    • Leukopenia    • Overweight    • Type II diabetes mellitus (CMS/HCC)        Past Surgical History:   Procedure Laterality Date   • COLONOSCOPY N/A 5/16/2020    Procedure: COLONOSCOPY TO ASCENDING COLON;  Surgeon: Aaron Pandya MD;  Location: [facility] ENDOSCOPY;  Service: Gastroenterology;  Laterality: N/A;  ANEMIA  --ABORTED AT ASCENDING COLON, INTERNAL HEMORRHOIDS    • ENDOSCOPY N/A 5/16/2020    Procedure: ESOPHAGOGASTRODUODENOSCOPY;  Surgeon: Aaron Pandya MD;  Location: [facility] ENDOSCOPY;  Service: Gastroenterology;  Laterality: N/A;  ANEMIA, BELTRÁN, CIRRHOSIS   --GRADE 1 VARICES        Family History   Problem Relation Age of Onset   • Diabetes Other    • Hypertension Other        Social History     Socioeconomic History   • Marital status: Single     Spouse name: Not on file   • Number of children: Not on file   • Years of education: Not on file   • Highest education level: Not on file   Tobacco Use   • Smoking status: Never Smoker   • Smokeless tobacco: Never Used   Substance and Sexual Activity   • Alcohol use: No     Frequency: Never         Current Outpatient Medications:   •  ALBUTEROL SULFATE  (90 Base) MCG/ACT inhaler, INHALE 2 PUFFS BY MOUTH EVERY 4 HOURS AS NEEDED FOR WHEEZING, Disp: 18 g, Rfl: 2  •  docusate sodium (COLACE) 100 MG capsule, Take 1 capsule by mouth Daily., Disp: 90 capsule, Rfl: 3  •  ENULOSE 10 GM/15ML solution solution (encephalopathy), Take 30 mL by mouth Daily., Disp: 473 mL, Rfl: 1  •  FEROSUL 325 (65 Fe) MG tablet, TAKE ONE TABLET BY MOUTH ONCE DAILY WITH FOOD FOR ANEMIA. TAKE WITH STOOL SOFTENER AS NEEDED, Disp: 30 tablet, Rfl: 10  •  FLUoxetine (PROzac) 20 MG capsule, Take 1 capsule by mouth Daily., Disp: 90  "capsule, Rfl: 0  •  glucose blood (FREESTYLE LITE) test strip, 1 each by Other route Daily. To check glucose, Disp: 100 each, Rfl: 2  •  Lancets (FREESTYLE) lancets, , Disp: , Rfl:   •  lisinopril (PRINIVIL,ZESTRIL) 40 MG tablet, TAKE 1 TABLET BY MOUTH EVERY DAY, Disp: 90 tablet, Rfl: 1  •  loratadine (CLARITIN) 10 MG tablet, TAKE 1 TABLET BY MOUTH EVERY DAY AS NEEDED FOR ALLERGIES, Disp: 90 tablet, Rfl: 2  •  pantoprazole (PROTONIX) 40 MG EC tablet, Take 1 tablet by mouth Daily., Disp: 90 tablet, Rfl: 1    Objective     Vitals:    08/06/20 1251   BP: 130/64   Pulse: 95   Temp: 97.8 °F (36.6 °C)   SpO2: 99%   Weight: 136 kg (298 lb 12.8 oz)   Height: 167.6 cm (66\")       Body mass index is 48.23 kg/m².    No components found for: 2D    Physical Exam   Constitutional: She is oriented to person, place, and time. She appears well-developed and well-nourished.   HENT:   Head: Normocephalic and atraumatic.   Eyes: Conjunctivae are normal.   Neck: Normal range of motion. Neck supple.   Cardiovascular: Normal rate, regular rhythm, normal heart sounds and intact distal pulses.   Pulmonary/Chest: Effort normal and breath sounds normal.   Abdominal: Soft. Bowel sounds are normal.   Abdomen is rotund   Musculoskeletal: Normal range of motion. She exhibits no edema.   Neurological: She is alert and oriented to person, place, and time.   Skin: Skin is warm and dry. Capillary refill takes less than 2 seconds. No rash noted.   Psychiatric: She has a normal mood and affect. Her behavior is normal. Judgment and thought content normal.   Nursing note and vitals reviewed.    Reviewed labs from the hospital on 8/4/2020: Labs returned to baseline prior to discharge.    Procedures    Assessment/Plan   Sameera was seen today for follow-up.    Diagnoses and all orders for this visit:    Liver failure without hepatic coma, unspecified chronicity (CMS/HCC)    Patient is stable at this time.  She will continue lactulose p.o. daily at home. " patient will follow up with GI/hepatologist ASAP.  Appt rescheduled for 8/21/2020.  Her sister plans on taking her to that appointment so there will be no transportation issues at the time.  F/u in 3 months with me.    There are no Patient Instructions on file for this visit.

## 2020-08-07 DIAGNOSIS — E11.9 TYPE 2 DIABETES MELLITUS WITHOUT COMPLICATION, WITHOUT LONG-TERM CURRENT USE OF INSULIN (HCC): Primary | ICD-10-CM

## 2020-08-07 RX ORDER — LANCETS 30 GAUGE
EACH MISCELLANEOUS
Qty: 100 EACH | Refills: 0 | Status: SHIPPED | OUTPATIENT
Start: 2020-08-07 | End: 2021-01-01

## 2020-08-21 ENCOUNTER — OFFICE VISIT (OUTPATIENT)
Dept: GASTROENTEROLOGY | Facility: CLINIC | Age: 55
End: 2020-08-21

## 2020-08-21 VITALS — HEIGHT: 66 IN | WEIGHT: 293 LBS | BODY MASS INDEX: 47.09 KG/M2

## 2020-08-21 DIAGNOSIS — R18.8 CIRRHOSIS OF LIVER WITH ASCITES, UNSPECIFIED HEPATIC CIRRHOSIS TYPE (HCC): Primary | ICD-10-CM

## 2020-08-21 DIAGNOSIS — D50.9 IRON DEFICIENCY ANEMIA, UNSPECIFIED IRON DEFICIENCY ANEMIA TYPE: ICD-10-CM

## 2020-08-21 DIAGNOSIS — K74.60 CIRRHOSIS OF LIVER WITH ASCITES, UNSPECIFIED HEPATIC CIRRHOSIS TYPE (HCC): Primary | ICD-10-CM

## 2020-08-21 DIAGNOSIS — K76.82 HEPATIC ENCEPHALOPATHY (HCC): ICD-10-CM

## 2020-08-21 DIAGNOSIS — K75.81 NASH (NONALCOHOLIC STEATOHEPATITIS): ICD-10-CM

## 2020-08-21 LAB
BASOPHILS # BLD AUTO: 0.04 10*3/MM3 (ref 0–0.2)
BASOPHILS NFR BLD AUTO: 1.5 % (ref 0–1.5)
EOSINOPHIL # BLD AUTO: 0.23 10*3/MM3 (ref 0–0.4)
EOSINOPHIL NFR BLD AUTO: 8.5 % (ref 0.3–6.2)
ERYTHROCYTE [DISTWIDTH] IN BLOOD BY AUTOMATED COUNT: 15.4 % (ref 12.3–15.4)
HCT VFR BLD AUTO: 35 % (ref 34–46.6)
HGB BLD-MCNC: 12 G/DL (ref 12–15.9)
IMM GRANULOCYTES # BLD AUTO: 0.01 10*3/MM3 (ref 0–0.05)
IMM GRANULOCYTES NFR BLD AUTO: 0.4 % (ref 0–0.5)
LYMPHOCYTES # BLD AUTO: 0.7 10*3/MM3 (ref 0.7–3.1)
LYMPHOCYTES NFR BLD AUTO: 25.8 % (ref 19.6–45.3)
MCH RBC QN AUTO: 33.3 PG (ref 26.6–33)
MCHC RBC AUTO-ENTMCNC: 34.3 G/DL (ref 31.5–35.7)
MCV RBC AUTO: 97.2 FL (ref 79–97)
MONOCYTES # BLD AUTO: 0.23 10*3/MM3 (ref 0.1–0.9)
MONOCYTES NFR BLD AUTO: 8.5 % (ref 5–12)
NEUTROPHILS # BLD AUTO: 1.5 10*3/MM3 (ref 1.7–7)
NEUTROPHILS NFR BLD AUTO: 55.3 % (ref 42.7–76)
NRBC BLD AUTO-RTO: 0 /100 WBC (ref 0–0.2)
PLATELET # BLD AUTO: 103 10*3/MM3 (ref 140–450)
RBC # BLD AUTO: 3.6 10*6/MM3 (ref 3.77–5.28)
WBC # BLD AUTO: 2.71 10*3/MM3 (ref 3.4–10.8)

## 2020-08-21 PROCEDURE — 99443 PR PHYS/QHP TELEPHONE EVALUATION 21-30 MIN: CPT | Performed by: NURSE PRACTITIONER

## 2020-08-21 NOTE — PROGRESS NOTES
Chief Complaint   Patient presents with   • Cirrhosis       Sameera Stapleton is a  55 y.o. female here for a telephone follow up visit for Cirrhosis.     HPI  54 yo f presents today accompanied with her sister for telephone follow up visit for BELTRÁN Cirrhosis. She is a patient of Dr. Smith. She was seen by our service at [facility] from 5/14-5/18/20. We were consulted for Cirrhosis, BELTRÁN, HE and MICHAEL.     You have chosen to receive care through a telephone visit. Do you consent to use a telephone visit for your medical care today? YES    We have not seen her in the office only in the hospital. She tells me she has recently been discharged from [facility] earlier this month for mental status changes.     Her sister tells me since the patient has been taking the lactulose daily she has not had anymore confusion or mental status changes. Patient admits she is taking the lactulose 30 ml daily and she is having about 4-5 loose stools a day. Patient denies any abdominal or lower extremity swelling today. Patient is not on any water pills. She is not taking xifaxan. She is taking protonix 40 mg daily and iron. She has had EGD and was found to have small varices. She is not on a beta blocker. She had negative barium enema before leaving [facility]. She tells me her appetite is good. She denies any jaundice or itching. She tells me she has been watching her salt. She doesn't eat any added salt. Eating well. She denies any dysphagia, reflux, abd pain, N&V, constipation, rectal bleeding or melena. She admits her appetite is good and her weight has dropped 5 lbs since early July.       Past Medical History:   Diagnosis Date   • Adjustment disorder with mixed anxiety and depressed mood    • Allergic rhinitis    • Anemia    • Anemia    • Elevated LFTs    • Encounter for long-term (current) use of medications    • GERD (gastroesophageal reflux disease)    • Hyperlipidemia    • Hypertension    •  Leukopenia    • Overweight    • Type II diabetes mellitus (CMS/HCC)        Past Surgical History:   Procedure Laterality Date   • COLONOSCOPY N/A 5/16/2020    Procedure: COLONOSCOPY TO ASCENDING COLON;  Surgeon: Aaron Pandya MD;  Location: [facility] ENDOSCOPY;  Service: Gastroenterology;  Laterality: N/A;  ANEMIA  --ABORTED AT ASCENDING COLON, INTERNAL HEMORRHOIDS    • ENDOSCOPY N/A 5/16/2020    Procedure: ESOPHAGOGASTRODUODENOSCOPY;  Surgeon: Aaron Pandya MD;  Location: [facility] ENDOSCOPY;  Service: Gastroenterology;  Laterality: N/A;  ANEMIA, BELTRÁN, CIRRHOSIS   --GRADE 1 VARICES        Scheduled Meds:    Continuous Infusions:  No current facility-administered medications for this visit.     PRN Meds:.    No Known Allergies    Social History     Socioeconomic History   • Marital status: Single     Spouse name: Not on file   • Number of children: Not on file   • Years of education: Not on file   • Highest education level: Not on file   Tobacco Use   • Smoking status: Never Smoker   • Smokeless tobacco: Never Used   Substance and Sexual Activity   • Alcohol use: No     Frequency: Never       Family History   Problem Relation Age of Onset   • Diabetes Other    • Hypertension Other        Review of Systems   Constitutional: Positive for fatigue. Negative for appetite change, chills, diaphoresis, fever and unexpected weight change.   HENT: Negative for nosebleeds, postnasal drip, sore throat, trouble swallowing and voice change.    Respiratory: Negative for cough, choking, chest tightness, shortness of breath, wheezing and stridor.    Cardiovascular: Negative for chest pain, palpitations and leg swelling.   Gastrointestinal: Negative for abdominal distention, abdominal pain, anal bleeding, blood in stool, constipation, diarrhea, nausea, rectal pain and vomiting.   Endocrine: Negative for polydipsia, polyphagia and polyuria.   Musculoskeletal: Negative for gait problem.   Skin: Negative for rash and wound.    Allergic/Immunologic: Negative for food allergies.   Neurological: Negative for dizziness, speech difficulty and light-headedness.   Psychiatric/Behavioral: Negative for confusion, self-injury, sleep disturbance and suicidal ideas.       There were no vitals filed for this visit.    Physical Exam   Constitutional: She is oriented to person, place, and time. No distress.   Neurological: She is alert and oriented to person, place, and time.   Psychiatric: She has a normal mood and affect. Her behavior is normal. Judgment and thought content normal.       No radiology results for the last 7 days     Assessment & Plan     1. Cirrhosis of liver with ascites, unspecified hepatic cirrhosis type (CMS/HCC)  - CBC & Differential  - Comprehensive Metabolic Panel  - Ammonia  - Protime-INR    2. BELTRÁN (nonalcoholic steatohepatitis)    3. Hepatic encephalopathy (CMS/HCC)  - CBC & Differential  - Comprehensive Metabolic Panel  - Ammonia  - Protime-INR  - riFAXIMin (Xifaxan) 550 MG tablet; Take 1 tablet by mouth Every 12 (Twelve) Hours.  Dispense: 60 tablet; Refill: 11    4. Iron deficiency anemia, unspecified iron deficiency anemia type  - CBC & Differential    Today's visit was done over the telephone. Total time over the phone was 30 minutes. Reviewed hospital records with her today. Will check labs today. Bowels moving well with lactulose. No signs of confusion or jaundice noted. Continue current meds. Will add xifaxan. Will continue 2 gram sodium diet. Daily weights. Avoid all ETOH and NSAIDs. Call office with any issues. Follow up with me in 2 weeks and Dr. Smith in 4-6 weeks.

## 2020-08-22 LAB
ALBUMIN SERPL-MCNC: 3 G/DL (ref 3.5–5.2)
ALBUMIN/GLOB SERPL: 0.8 G/DL
ALP SERPL-CCNC: 122 U/L (ref 39–117)
ALT SERPL-CCNC: 22 U/L (ref 1–33)
AMMONIA PLAS-MCNC: 82 UMOL/L (ref 11–51)
AST SERPL-CCNC: 56 U/L (ref 1–32)
BILIRUB SERPL-MCNC: 1.9 MG/DL (ref 0–1.2)
BUN SERPL-MCNC: 9 MG/DL (ref 6–20)
BUN/CREAT SERPL: 10 (ref 7–25)
CALCIUM SERPL-MCNC: 8.8 MG/DL (ref 8.6–10.5)
CHLORIDE SERPL-SCNC: 105 MMOL/L (ref 98–107)
CO2 SERPL-SCNC: 29.3 MMOL/L (ref 22–29)
CREAT SERPL-MCNC: 0.9 MG/DL (ref 0.57–1)
GLOBULIN SER CALC-MCNC: 3.8 GM/DL
GLUCOSE SERPL-MCNC: 140 MG/DL (ref 65–99)
INR PPP: 1.64 (ref 0.9–1.1)
POTASSIUM SERPL-SCNC: 4 MMOL/L (ref 3.5–5.2)
PROT SERPL-MCNC: 6.8 G/DL (ref 6–8.5)
PROTHROMBIN TIME: 19.1 SECONDS (ref 11.7–14.2)
SODIUM SERPL-SCNC: 140 MMOL/L (ref 136–145)

## 2020-08-24 ENCOUNTER — TELEPHONE (OUTPATIENT)
Dept: GASTROENTEROLOGY | Facility: CLINIC | Age: 55
End: 2020-08-24

## 2020-08-24 RX ORDER — LACTULOSE 10 G/15ML
20 SOLUTION ORAL; RECTAL DAILY
Qty: 473 ML | Refills: 1 | Status: SHIPPED | OUTPATIENT
Start: 2020-08-24 | End: 2020-09-08

## 2020-08-24 NOTE — TELEPHONE ENCOUNTER
Called pt's and advised of the need for a pa for the xifaxan. Advised we will have 2 boxes of samples for her at the .  She verb understanding.

## 2020-08-24 NOTE — TELEPHONE ENCOUNTER
----- Message from Julian Zaldivar Rep sent at 8/24/2020 10:07 AM EDT -----  Contact: [phone]  Patients sister Shawna called and stated Pharmacist says Prescription has not been signed by Julee for   riFAXIMin (Xifaxan) 550 MG tablet [[id number]] (Order [id number])    [facility] - [place], [place] - [address] [place] [address] - [phone]  - [phone]    Shawna patients sister cell [phone]

## 2020-08-24 NOTE — TELEPHONE ENCOUNTER
----- Message from ZENIA Zapata sent at 8/24/2020  8:52 AM EDT -----  Please call the patient and let her know her labs look okay.  Total bilirubin is slightly elevated at 1.9 which looks to be pretty much the same for her as well as a slightly elevated alkaline phosphatase which is barely above normal.  AST is also elevated which also looks to be about the same as previous.  Ammonia level is slightly elevated at 82.  But much better than it was 2 months ago.  Hemoglobin is stable.

## 2020-08-24 NOTE — TELEPHONE ENCOUNTER
Called pt 's [facility] and spoke with Epi who advised that the script needs a pa.   Message sent to MA's regarding pa.     Called pt's sister and left vm for her to call back.

## 2020-09-08 RX ORDER — LACTULOSE 10 G/15ML
SOLUTION ORAL; RECTAL
Qty: 473 ML | Refills: 1 | Status: SHIPPED | OUTPATIENT
Start: 2020-09-08 | End: 2020-10-12

## 2020-09-10 ENCOUNTER — OFFICE VISIT (OUTPATIENT)
Dept: GASTROENTEROLOGY | Facility: CLINIC | Age: 55
End: 2020-09-10

## 2020-09-10 VITALS — WEIGHT: 293 LBS | BODY MASS INDEX: 47.09 KG/M2 | HEIGHT: 66 IN | TEMPERATURE: 98.4 F

## 2020-09-10 DIAGNOSIS — D50.9 IRON DEFICIENCY ANEMIA, UNSPECIFIED IRON DEFICIENCY ANEMIA TYPE: ICD-10-CM

## 2020-09-10 DIAGNOSIS — G93.40: ICD-10-CM

## 2020-09-10 DIAGNOSIS — K74.4 SECONDARY BILIARY CIRRHOSIS (HCC): Primary | ICD-10-CM

## 2020-09-10 DIAGNOSIS — K76.0: ICD-10-CM

## 2020-09-10 DIAGNOSIS — K21.9 GASTROESOPHAGEAL REFLUX DISEASE, ESOPHAGITIS PRESENCE NOT SPECIFIED: ICD-10-CM

## 2020-09-10 PROCEDURE — 99214 OFFICE O/P EST MOD 30 MIN: CPT | Performed by: NURSE PRACTITIONER

## 2020-09-10 NOTE — PROGRESS NOTES
Chief Complaint   Patient presents with   • Cirrhosis       Sameera Stapleton is a  55 y.o. female here for a follow up visit for cirrhosis.    HPI  55-year-old female presents today accompanied by her sister for follow-up visit for Hall cirrhosis.  She is a patient of Dr. Smith.  She was last seen on a telephone visit on 2020.  She has a history of Hall cirrhosis and admits she is doing a lot better since getting on Xifaxan.  She is taking lactulose and having 4-5 loose stools a day.  She tells me since taking the Xifaxan with the lactulose she is having a lot more energy and she is sleeping better at night.  She tells me she is been able to help her boyfriend more around the house.  She denies any abdominal swelling or bilateral pedal edema.  She does not have a scale at home so she is not monitoring her weight.  She is trying to watch her salt intake.  She denies any dysphagia, reflux, abdominal pain, nausea and vomiting, constipation, rectal bleeding or melena.  She wants her appetite is good and her weight has increased 6 pounds since July.  Her last EGD and colonoscopy was done on 2020.  Does have a history of grade 1 varices.  She does have a history of GERD and admits she does well on Protonix 40 mg once daily.  She denies any jaundice, itching or confusion.  The last CT scan of the abdomen pelvis was done this past May and it showed:    The CT scan was performed through the abdomen and pelvis with  intravenous contrast and demonstrates the followin. There are no prior exams for comparison. The liver has a contracted  and vaguely nodular contour and this is combined with a slightly  prominent spleen and moderate amount of ascites scattered throughout the  abdomen and extending into the pelvis and most consistent with  cirrhosis. No significant varices are identified by CT. There is no  portal vein thrombosis. No focal liver lesions are identified.  2. The lung bases are clear. The pancreas and both  adrenal glands are  unremarkable. There are several tiny nonobstructing stones involving  both kidneys. There is no aortic aneurysm or retroperitoneal  lymphadenopathy.  3. The patient is markedly obese. In the pelvis the uterus is normal in  size. The urinary bladder is somewhat decompressed but has a smooth  Contour.      She is happy to report that since getting on the Xifaxan with the lactulose she is feeling a lot better.  She denies any confusion, lethargy or jaundice with itching.  She tells me she has about 4-5 loose stools every day on the lactulose now.  She tells me she is eating well and she has a good appetite.  She is trying to avoid salt.  She does not weigh herself every day because she does not have a scale at home.  She admits she needs to get 1.  She denies any dysphagia, reflux, nausea and vomiting, constipation, rectal bleeding or melena.  She has gained 10 pounds since the beginning of August.  She does feel like she is got some swelling in her belly and in her legs but she thinks is better than what it was when she was in the hospital.  Past Medical History:   Diagnosis Date   • Adjustment disorder with mixed anxiety and depressed mood    • Allergic rhinitis    • Anemia    • Anemia    • Elevated LFTs    • Encounter for long-term (current) use of medications    • GERD (gastroesophageal reflux disease)    • Hyperlipidemia    • Hypertension    • Leukopenia    • Overweight    • Type II diabetes mellitus (CMS/HCC)        Past Surgical History:   Procedure Laterality Date   • COLONOSCOPY N/A 5/16/2020    Procedure: COLONOSCOPY TO ASCENDING COLON;  Surgeon: Aaron Pandya MD;  Location: [facility] ENDOSCOPY;  Service: Gastroenterology;  Laterality: N/A;  ANEMIA  --ABORTED AT ASCENDING COLON, INTERNAL HEMORRHOIDS    • ENDOSCOPY N/A 5/16/2020    Procedure: ESOPHAGOGASTRODUODENOSCOPY;  Surgeon: Aaron Pandya MD;  Location: [facility] ENDOSCOPY;  Service: Gastroenterology;  Laterality: N/A;  ANEMIA, BELTRÁN,  CIRRHOSIS   --GRADE 1 VARICES        Scheduled Meds:    Continuous Infusions:  No current facility-administered medications for this visit.     PRN Meds:.    No Known Allergies    Social History     Socioeconomic History   • Marital status: Single     Spouse name: Not on file   • Number of children: Not on file   • Years of education: Not on file   • Highest education level: Not on file   Tobacco Use   • Smoking status: Never Smoker   • Smokeless tobacco: Never Used   Substance and Sexual Activity   • Alcohol use: No     Frequency: Never       Family History   Problem Relation Age of Onset   • Diabetes Other    • Hypertension Other        Review of Systems   Constitutional: Negative for appetite change, chills, diaphoresis, fatigue, fever and unexpected weight change.   HENT: Negative for nosebleeds, postnasal drip, sore throat, trouble swallowing and voice change.    Respiratory: Negative for cough, choking, chest tightness, shortness of breath and wheezing.    Cardiovascular: Negative for chest pain, palpitations and leg swelling.   Gastrointestinal: Positive for abdominal distention and diarrhea. Negative for abdominal pain, anal bleeding, blood in stool, constipation, nausea, rectal pain and vomiting.   Endocrine: Negative for polydipsia, polyphagia and polyuria.   Musculoskeletal: Negative for gait problem.   Skin: Negative for rash and wound.   Allergic/Immunologic: Negative for food allergies.   Neurological: Negative for dizziness, speech difficulty and light-headedness.   Psychiatric/Behavioral: Negative for confusion, self-injury, sleep disturbance and suicidal ideas.       Vitals:    09/10/20 1310   Temp: 98.4 °F (36.9 °C)       Physical Exam   Constitutional: She is oriented to person, place, and time. She appears well-developed and well-nourished. She does not appear ill. No distress.   HENT:   Head: Normocephalic.   Eyes: Pupils are equal, round, and reactive to light.   Cardiovascular: Normal rate,  regular rhythm and normal heart sounds.   Pulmonary/Chest: Effort normal and breath sounds normal.   Abdominal: Soft. Bowel sounds are normal. She exhibits distension and ascites. She exhibits no mass. There is no hepatosplenomegaly. There is no tenderness. There is no rebound and no guarding. No hernia.   ascites noted today on exam. Hard to tell how much due to body habitus.    Musculoskeletal: Normal range of motion. She exhibits edema.   +1 bilateral pedal edema.   Neurological: She is alert and oriented to person, place, and time.   Skin: Skin is warm and dry.   Psychiatric: She has a normal mood and affect. Her speech is normal and behavior is normal. Judgment normal.       No radiology results for the last 7 days     Assessment & Plan     1. Secondary biliary cirrhosis (CMS/HCC)  - CBC & Differential  - Comprehensive Metabolic Panel  - Protime-INR  - Ammonia  - AFP Tumor Marker  - Iron Profile    2. Fatty liver with encephalopathy  - CBC & Differential  - Comprehensive Metabolic Panel  - Protime-INR  - Ammonia  - AFP Tumor Marker  - Iron Profile    3. Gastroesophageal reflux disease, esophagitis presence not specified    4. Iron deficiency anemia, unspecified iron deficiency anemia type  - CBC & Differential  - Iron Profile    Reviewed most recent labs with her today.  She seems stable today.  I do wonder due to her body habitus how much ascites she might have.  She does have some swelling in her ankles and feet.  I am getting go ahead and order some labs and also a liver ultrasound.  Just to make sure she does not warrant a paracentesis.  She needs to continue current medications.  Continue a 2 g sodium diet.  She needs to buy a scale so she can start checking her weight.  Patient to continue to abstain from all alcohol and NSAIDs.  Patient to follow-up with Dr. Smith as planned.  Patient to call the office with any issues.

## 2020-09-11 LAB
AFP-TM SERPL-MCNC: 4.5 NG/ML (ref 0–8.3)
ALBUMIN SERPL-MCNC: 2.9 G/DL (ref 3.5–5.2)
ALBUMIN/GLOB SERPL: 0.8 G/DL
ALP SERPL-CCNC: 132 U/L (ref 39–117)
ALT SERPL-CCNC: 21 U/L (ref 1–33)
AMMONIA PLAS-MCNC: 91 UMOL/L (ref 11–51)
AST SERPL-CCNC: 49 U/L (ref 1–32)
BASOPHILS # BLD AUTO: ABNORMAL 10*3/UL
BASOPHILS # BLD MANUAL: 0.04 10*3/MM3 (ref 0–0.2)
BASOPHILS NFR BLD MANUAL: 1.1 % (ref 0–1.5)
BILIRUB SERPL-MCNC: 1.7 MG/DL (ref 0–1.2)
BUN SERPL-MCNC: 8 MG/DL (ref 6–20)
BUN/CREAT SERPL: 9.5 (ref 7–25)
CALCIUM SERPL-MCNC: 8.8 MG/DL (ref 8.6–10.5)
CHLORIDE SERPL-SCNC: 109 MMOL/L (ref 98–107)
CO2 SERPL-SCNC: 25 MMOL/L (ref 22–29)
CREAT SERPL-MCNC: 0.84 MG/DL (ref 0.57–1)
DIFFERENTIAL COMMENT: ABNORMAL
EOSINOPHIL # BLD AUTO: ABNORMAL 10*3/UL
EOSINOPHIL # BLD MANUAL: 0.2 10*3/MM3 (ref 0–0.4)
EOSINOPHIL NFR BLD AUTO: ABNORMAL %
EOSINOPHIL NFR BLD MANUAL: 5.6 % (ref 0.3–6.2)
ERYTHROCYTE [DISTWIDTH] IN BLOOD BY AUTOMATED COUNT: 13.6 % (ref 12.3–15.4)
GLOBULIN SER CALC-MCNC: 3.6 GM/DL
GLUCOSE SERPL-MCNC: 119 MG/DL (ref 65–99)
HCT VFR BLD AUTO: 34 % (ref 34–46.6)
HGB BLD-MCNC: 11.8 G/DL (ref 12–15.9)
INR PPP: 1.8 (ref 0.9–1.1)
IRON SATN MFR SERPL: 42 % (ref 20–50)
IRON SERPL-MCNC: 115 MCG/DL (ref 37–145)
LYMPHOCYTES # BLD AUTO: ABNORMAL 10*3/UL
LYMPHOCYTES # BLD MANUAL: 0.63 10*3/MM3 (ref 0.7–3.1)
LYMPHOCYTES NFR BLD AUTO: ABNORMAL %
LYMPHOCYTES NFR BLD MANUAL: 17.8 % (ref 19.6–45.3)
MCH RBC QN AUTO: 34.5 PG (ref 26.6–33)
MCHC RBC AUTO-ENTMCNC: 34.7 G/DL (ref 31.5–35.7)
MCV RBC AUTO: 99.4 FL (ref 79–97)
MONOCYTES # BLD MANUAL: 0.31 10*3/MM3 (ref 0.1–0.9)
MONOCYTES NFR BLD AUTO: ABNORMAL %
MONOCYTES NFR BLD MANUAL: 8.9 % (ref 5–12)
NEUTROPHILS # BLD MANUAL: 2.35 10*3/MM3 (ref 1.7–7)
NEUTROPHILS NFR BLD AUTO: ABNORMAL %
NEUTROPHILS NFR BLD MANUAL: 66.7 % (ref 42.7–76)
PLATELET # BLD AUTO: 79 10*3/MM3 (ref 140–450)
PLATELET BLD QL SMEAR: ABNORMAL
POTASSIUM SERPL-SCNC: 4.1 MMOL/L (ref 3.5–5.2)
PROT SERPL-MCNC: 6.5 G/DL (ref 6–8.5)
PROTHROMBIN TIME: 20.4 SECONDS (ref 11.7–14.2)
RBC # BLD AUTO: 3.42 10*6/MM3 (ref 3.77–5.28)
RBC MORPH BLD: ABNORMAL
SODIUM SERPL-SCNC: 142 MMOL/L (ref 136–145)
TIBC SERPL-MCNC: 271 MCG/DL
UIBC SERPL-MCNC: 156 MCG/DL (ref 112–346)
WBC # BLD AUTO: 3.53 10*3/MM3 (ref 3.4–10.8)

## 2020-09-21 ENCOUNTER — HOSPITAL ENCOUNTER (OUTPATIENT)
Dept: ULTRASOUND IMAGING | Facility: HOSPITAL | Age: 55
Discharge: HOME | End: 2020-09-21
Admitting: NURSE PRACTITIONER

## 2020-09-21 DIAGNOSIS — G93.40: ICD-10-CM

## 2020-09-21 DIAGNOSIS — K76.0: ICD-10-CM

## 2020-09-21 DIAGNOSIS — K74.4 SECONDARY BILIARY CIRRHOSIS (HCC): ICD-10-CM

## 2020-09-21 PROCEDURE — 76705 ECHO EXAM OF ABDOMEN: CPT

## 2020-09-23 ENCOUNTER — OFFICE VISIT (OUTPATIENT)
Dept: FAMILY MEDICINE CLINIC | Facility: CLINIC | Age: 55
End: 2020-09-23

## 2020-09-23 ENCOUNTER — TELEPHONE (OUTPATIENT)
Dept: GASTROENTEROLOGY | Facility: CLINIC | Age: 55
End: 2020-09-23

## 2020-09-23 VITALS
OXYGEN SATURATION: 98 % | TEMPERATURE: 96.9 F | WEIGHT: 293 LBS | DIASTOLIC BLOOD PRESSURE: 72 MMHG | HEIGHT: 66 IN | SYSTOLIC BLOOD PRESSURE: 142 MMHG | HEART RATE: 86 BPM | BODY MASS INDEX: 47.09 KG/M2

## 2020-09-23 DIAGNOSIS — M25.562 ACUTE PAIN OF LEFT KNEE: Primary | ICD-10-CM

## 2020-09-23 DIAGNOSIS — Z23 NEEDS FLU SHOT: ICD-10-CM

## 2020-09-23 PROCEDURE — 90686 IIV4 VACC NO PRSV 0.5 ML IM: CPT | Performed by: FAMILY MEDICINE

## 2020-09-23 PROCEDURE — 90471 IMMUNIZATION ADMIN: CPT | Performed by: FAMILY MEDICINE

## 2020-09-23 PROCEDURE — 99214 OFFICE O/P EST MOD 30 MIN: CPT | Performed by: FAMILY MEDICINE

## 2020-09-23 RX ORDER — IBUPROFEN 600 MG/1
TABLET ORAL
COMMUNITY
Start: 2020-09-16 | End: 2020-09-23

## 2020-09-23 RX ORDER — MELOXICAM 15 MG/1
15 TABLET ORAL DAILY
Qty: 30 TABLET | Refills: 0 | Status: SHIPPED | OUTPATIENT
Start: 2020-09-23 | End: 2020-10-02

## 2020-09-23 NOTE — PROGRESS NOTES
Sreekanth Stapleton is a 55 y.o. female.     Chief Complaint   Patient presents with   • Follow-up     [facility], left leg (knee pain) records in media       Patient is here today for new problem.  She was seen in the ER on September 16, 2020 at [facility].  She states that earlier that day she noticed acute left knee pain and swelling.  There was no trauma or injury.  She also had stiffness in the medial segment of her left knee.  She was taken to the emergency room and x-rays were obtained which showed nothing acute but arthritis.  She was advised to take ibuprofen 600 mg twice daily.  It has helped with both the pain and the stiffness.  She has an appointment tomorrow with Dr. Gottlieb but is wondering if she needs to go or not.       Review of Systems   Constitutional: Negative for activity change, chills, fatigue and fever.   HENT: Negative for hearing loss, swollen glands, tinnitus and trouble swallowing.    Eyes: Negative for pain and visual disturbance.   Respiratory: Negative for cough and shortness of breath.    Cardiovascular: Negative for chest pain, palpitations and leg swelling.   Gastrointestinal: Negative for diarrhea and nausea.   Endocrine: Negative for polydipsia and polyuria.   Genitourinary: Negative for difficulty urinating and urinary incontinence.   Musculoskeletal: Positive for arthralgias (L knee). Negative for gait problem and joint swelling.   Skin: Negative for rash.   Allergic/Immunologic: Negative for immunocompromised state.   Neurological: Negative for dizziness, light-headedness and headache.   Hematological: Negative for adenopathy. Does not bruise/bleed easily.   Psychiatric/Behavioral: Negative for dysphoric mood and sleep disturbance.       The following portions of the patient's history were reviewed and updated as appropriate: allergies, current medications, past family history, past medical history, past social history, past surgical history and problem  list.    Past Medical History:   Diagnosis Date   • Adjustment disorder with mixed anxiety and depressed mood    • Allergic rhinitis    • Anemia    • Anemia    • Elevated LFTs    • Encounter for long-term (current) use of medications    • GERD (gastroesophageal reflux disease)    • Hyperlipidemia    • Hypertension    • Leukopenia    • Overweight    • Type II diabetes mellitus (CMS/HCC)        Past Surgical History:   Procedure Laterality Date   • COLONOSCOPY N/A 5/16/2020    Procedure: COLONOSCOPY TO ASCENDING COLON;  Surgeon: Aaron Pandya MD;  Location: [facility] ENDOSCOPY;  Service: Gastroenterology;  Laterality: N/A;  ANEMIA  --ABORTED AT ASCENDING COLON, INTERNAL HEMORRHOIDS    • ENDOSCOPY N/A 5/16/2020    Procedure: ESOPHAGOGASTRODUODENOSCOPY;  Surgeon: Aaron Pandya MD;  Location: [facility] ENDOSCOPY;  Service: Gastroenterology;  Laterality: N/A;  ANEMIA, BELTRÁN, CIRRHOSIS   --GRADE 1 VARICES        Family History   Problem Relation Age of Onset   • Diabetes Other    • Hypertension Other        Social History     Socioeconomic History   • Marital status: Single     Spouse name: Not on file   • Number of children: Not on file   • Years of education: Not on file   • Highest education level: Not on file   Tobacco Use   • Smoking status: Never Smoker   • Smokeless tobacco: Never Used   Substance and Sexual Activity   • Alcohol use: No     Frequency: Never         Current Outpatient Medications:   •  ALBUTEROL SULFATE  (90 Base) MCG/ACT inhaler, INHALE 2 PUFFS BY MOUTH EVERY 4 HOURS AS NEEDED FOR WHEEZING, Disp: 18 g, Rfl: 2  •  Blood Glucose Monitoring Suppl w/Device kit, USE TO TEST BLOOD SUGAR ONCE DAILY. DX CODE E11.65  Indications: Type 2 Diabetes, Disp: 1 each, Rfl: 0  •  ENULOSE 10 GM/15ML solution solution (encephalopathy), TAKE 30 ML EVERY DAY, Disp: 473 mL, Rfl: 1  •  FEROSUL 325 (65 Fe) MG tablet, TAKE ONE TABLET BY MOUTH ONCE DAILY WITH FOOD FOR ANEMIA. TAKE WITH STOOL SOFTENER AS NEEDED, Disp: 30  "tablet, Rfl: 10  •  FLUoxetine (PROzac) 20 MG capsule, Take 1 capsule by mouth Daily., Disp: 90 capsule, Rfl: 0  •  glucose blood (FREESTYLE LITE) test strip, 1 each by Other route Daily. To check glucose, Disp: 100 each, Rfl: 2  •  Lancets misc, USE TO TEST BLOOD SUGAR TWICE DAILY. DX CODE E11.65  Indications: Type 2 Diabetes, Disp: 100 each, Rfl: 0  •  lisinopril (PRINIVIL,ZESTRIL) 40 MG tablet, TAKE 1 TABLET BY MOUTH EVERY DAY, Disp: 90 tablet, Rfl: 1  •  loratadine (CLARITIN) 10 MG tablet, TAKE 1 TABLET BY MOUTH EVERY DAY AS NEEDED FOR ALLERGIES, Disp: 90 tablet, Rfl: 2  •  pantoprazole (PROTONIX) 40 MG EC tablet, Take 1 tablet by mouth Daily., Disp: 90 tablet, Rfl: 1  •  riFAXIMin (Xifaxan) 550 MG tablet, Take 1 tablet by mouth Every 12 (Twelve) Hours., Disp: 60 tablet, Rfl: 11  •  meloxicam (MOBIC) 15 MG tablet, Take 1 tablet by mouth Daily. For Left knee pain and swelling, Disp: 30 tablet, Rfl: 0    Objective     Vitals:    09/23/20 1324   BP: 142/72   Pulse: 86   Temp: 96.9 °F (36.1 °C)   SpO2: 98%   Weight: (!) 143 kg (315 lb 9.6 oz)   Height: 167.6 cm (66\")       Body mass index is 50.94 kg/m².    No components found for: 2D    Physical Exam  Vitals signs and nursing note reviewed.   Constitutional:       Appearance: She is well-developed.   HENT:      Head: Normocephalic and atraumatic.      Right Ear: External ear normal.      Left Ear: External ear normal.      Nose: Nose normal.   Eyes:      General: No scleral icterus.     Conjunctiva/sclera: Conjunctivae normal.   Neck:      Musculoskeletal: Normal range of motion and neck supple.   Cardiovascular:      Rate and Rhythm: Normal rate and regular rhythm.      Heart sounds: Normal heart sounds.   Pulmonary:      Effort: Pulmonary effort is normal.      Breath sounds: Normal breath sounds.   Musculoskeletal: Normal range of motion.         General: No swelling, tenderness, deformity or signs of injury.      Right lower leg: No edema.      Left lower leg: " "No edema.   Lymphadenopathy:      Cervical: No cervical adenopathy.   Skin:     General: Skin is warm and dry.      Findings: No rash.   Neurological:      General: No focal deficit present.      Mental Status: She is alert and oriented to person, place, and time.   Psychiatric:         Mood and Affect: Mood normal.         Behavior: Behavior normal.         Thought Content: Thought content normal.         Judgment: Judgment normal.     Reviewed ER report and left knee x-ray.  No acute findings on the x-ray but arthritis was seen.    Procedures    Assessment/Plan   Sameera was seen today for follow-up.    Diagnoses and all orders for this visit:    Acute pain of left knee  -     meloxicam (MOBIC) 15 MG tablet; Take 1 tablet by mouth Daily. For Left knee pain and swelling    Needs flu shot  -     FluLaval Quad >6 Months (3200-1998)    I believe the patient's left knee pain is due to osteoarthritis flare.  I have advised that she not take the ibuprofen any longer and just take meloxicam once daily with food for the next week and then as needed.  She should also use ice applications until the swelling goes down.  No heat.  I also recommended the following exercises.  If this conservative management does not help then I will refer her to Dr. Gottlieb.    Patient Instructions   Journal for Nurse Practitioners, 15(4), 263-267. Retrieved October 7, 2019 from [email or web address]">     Knee Exercises  Ask your health care provider which exercises are safe for you. Do exercises exactly as told by your health care provider and adjust them as directed. It is normal to feel mild stretching, pulling, tightness, or discomfort as you do these exercises. Stop right away if you feel sudden pain or your pain gets worse. Do not begin these exercises until told by your health care provider.  Stretching and range-of-motion exercises  These exercises warm up your muscles and joints and improve the movement and flexibility of your " knee. These exercises also help to relieve pain and swelling.  Knee extension, prone  1. Lie on your abdomen (prone position) on a bed.  2. Place your left / right knee just beyond the edge of the surface so your knee is not on the bed. You can put a towel under your left / right thigh just above your kneecap for comfort.  3. Relax your leg muscles and allow gravity to straighten your knee (extension). You should feel a stretch behind your left / right knee.  4. Hold this position for __________ seconds.  5. Scoot up so your knee is supported between repetitions.  Repeat __________ times. Complete this exercise __________ times a day.  Knee flexion, active    1. Lie on your back with both legs straight. If this causes back discomfort, bend your left / right knee so your foot is flat on the floor.  2. Slowly slide your left / right heel back toward your buttocks. Stop when you feel a gentle stretch in the front of your knee or thigh (flexion).  3. Hold this position for __________ seconds.  4. Slowly slide your left / right heel back to the starting position.  Repeat __________ times. Complete this exercise __________ times a day.  Quadriceps stretch, prone    1. Lie on your abdomen on a firm surface, such as a bed or padded floor.  2. Bend your left / right knee and hold your ankle. If you cannot reach your ankle or pant leg, loop a belt around your foot and grab the belt instead.  3. Gently pull your heel toward your buttocks. Your knee should not slide out to the side. You should feel a stretch in the front of your thigh and knee (quadriceps).  4. Hold this position for __________ seconds.  Repeat __________ times. Complete this exercise __________ times a day.  Hamstring, supine  1. Lie on your back (supine position).  2. Loop a belt or towel over the ball of your left / right foot. The ball of your foot is on the walking surface, right under your toes.  3. Straighten your left / right knee and slowly pull on the  belt to raise your leg until you feel a gentle stretch behind your knee (hamstring).  ? Do not let your knee bend while you do this.  ? Keep your other leg flat on the floor.  4. Hold this position for __________ seconds.  Repeat __________ times. Complete this exercise __________ times a day.  Strengthening exercises  These exercises build strength and endurance in your knee. Endurance is the ability to use your muscles for a long time, even after they get tired.  Quadriceps, isometric  This exercise stretches the muscles in front of your thigh (quadriceps) without moving your knee joint (isometric).  1. Lie on your back with your left / right leg extended and your other knee bent. Put a rolled towel or small pillow under your knee if told by your health care provider.  2. Slowly tense the muscles in the front of your left / right thigh. You should see your kneecap slide up toward your hip or see increased dimpling just above the knee. This motion will push the back of the knee toward the floor.  3. For __________ seconds, hold the muscle as tight as you can without increasing your pain.  4. Relax the muscles slowly and completely.  Repeat __________ times. Complete this exercise __________ times a day.  Straight leg raises  This exercise stretches the muscles in front of your thigh (quadriceps) and the muscles that move your hips (hip flexors).  1. Lie on your back with your left / right leg extended and your other knee bent.  2. Tense the muscles in the front of your left / right thigh. You should see your kneecap slide up or see increased dimpling just above the knee. Your thigh may even shake a bit.  3. Keep these muscles tight as you raise your leg 4-6 inches (10-15 cm) off the floor. Do not let your knee bend.  4. Hold this position for __________ seconds.  5. Keep these muscles tense as you lower your leg.  6. Relax your muscles slowly and completely after each repetition.  Repeat __________ times. Complete  "this exercise __________ times a day.  Hamstring, isometric  1. Lie on your back on a firm surface.  2. Bend your left / right knee about __________ degrees.  3. Dig your left / right heel into the surface as if you are trying to pull it toward your buttocks. Tighten the muscles in the back of your thighs (hamstring) to \"dig\" as hard as you can without increasing any pain.  4. Hold this position for __________ seconds.  5. Release the tension gradually and allow your muscles to relax completely for __________ seconds after each repetition.  Repeat __________ times. Complete this exercise __________ times a day.  Hamstring curls  If told by your health care provider, do this exercise while wearing ankle weights. Begin with __________ lb weights. Then increase the weight by 1 lb (0.5 kg) increments. Do not wear ankle weights that are more than __________ lb.  1. Lie on your abdomen with your legs straight.  2. Bend your left / right knee as far as you can without feeling pain. Keep your hips flat against the floor.  3. Hold this position for __________ seconds.  4. Slowly lower your leg to the starting position.  Repeat __________ times. Complete this exercise __________ times a day.  Squats  This exercise strengthens the muscles in front of your thigh and knee (quadriceps).  1.  front of a table, with your feet and knees pointing straight ahead. You may rest your hands on the table for balance but not for support.  2. Slowly bend your knees and lower your hips like you are going to sit in a chair.  ? Keep your weight over your heels, not over your toes.  ? Keep your lower legs upright so they are parallel with the table legs.  ? Do not let your hips go lower than your knees.  ? Do not bend lower than told by your health care provider.  ? If your knee pain increases, do not bend as low.  3. Hold the squat position for __________ seconds.  4. Slowly push with your legs to return to standing. Do not use your " hands to pull yourself to standing.  Repeat __________ times. Complete this exercise __________ times a day.  Wall slides  This exercise strengthens the muscles in front of your thigh and knee (quadriceps).  1. Lean your back against a smooth wall or door, and walk your feet out 18-24 inches (46-61 cm) from it.  2. Place your feet hip-width apart.  3. Slowly slide down the wall or door until your knees bend __________ degrees. Keep your knees over your heels, not over your toes. Keep your knees in line with your hips.  4. Hold this position for __________ seconds.  Repeat __________ times. Complete this exercise __________ times a day.  Straight leg raises  This exercise strengthens the muscles that rotate the leg at the hip and move it away from your body (hip abductors).  1. Lie on your side with your left / right leg in the top position. Lie so your head, shoulder, knee, and hip line up. You may bend your bottom knee to help you keep your balance.  2. Roll your hips slightly forward so your hips are stacked directly over each other and your left / right knee is facing forward.  3. Leading with your heel, lift your top leg 4-6 inches (10-15 cm). You should feel the muscles in your outer hip lifting.  ? Do not let your foot drift forward.  ? Do not let your knee roll toward the ceiling.  4. Hold this position for __________ seconds.  5. Slowly return your leg to the starting position.  6. Let your muscles relax completely after each repetition.  Repeat __________ times. Complete this exercise __________ times a day.  Straight leg raises  This exercise stretches the muscles that move your hips away from the front of the pelvis (hip extensors).  1. Lie on your abdomen on a firm surface. You can put a pillow under your hips if that is more comfortable.  2. Tense the muscles in your buttocks and lift your left / right leg about 4-6 inches (10-15 cm). Keep your knee straight as you lift your leg.  3. Hold this position  for __________ seconds.  4. Slowly lower your leg to the starting position.  5. Let your leg relax completely after each repetition.  Repeat __________ times. Complete this exercise __________ times a day.  This information is not intended to replace advice given to you by your health care provider. Make sure you discuss any questions you have with your health care provider.  Document Released: 11/01/2006 Document Revised: 10/08/2019 Document Reviewed: 10/08/2019  [employer] Patient Education © 2020 [employer]

## 2020-09-23 NOTE — PATIENT INSTRUCTIONS
"Journal for Nurse Practitioners, 15(4), 263-267. Retrieved October 7, 2019 from [email or web address].[email or web address]">     Knee Exercises  Ask your health care provider which exercises are safe for you. Do exercises exactly as told by your health care provider and adjust them as directed. It is normal to feel mild stretching, pulling, tightness, or discomfort as you do these exercises. Stop right away if you feel sudden pain or your pain gets worse. Do not begin these exercises until told by your health care provider.  Stretching and range-of-motion exercises  These exercises warm up your muscles and joints and improve the movement and flexibility of your knee. These exercises also help to relieve pain and swelling.  Knee extension, prone  1. Lie on your abdomen (prone position) on a bed.  2. Place your left / right knee just beyond the edge of the surface so your knee is not on the bed. You can put a towel under your left / right thigh just above your kneecap for comfort.  3. Relax your leg muscles and allow gravity to straighten your knee (extension). You should feel a stretch behind your left / right knee.  4. Hold this position for __________ seconds.  5. Scoot up so your knee is supported between repetitions.  Repeat __________ times. Complete this exercise __________ times a day.  Knee flexion, active    1. Lie on your back with both legs straight. If this causes back discomfort, bend your left / right knee so your foot is flat on the floor.  2. Slowly slide your left / right heel back toward your buttocks. Stop when you feel a gentle stretch in the front of your knee or thigh (flexion).  3. Hold this position for __________ seconds.  4. Slowly slide your left / right heel back to the starting position.  Repeat __________ times. Complete this exercise __________ times a day.  Quadriceps stretch, prone    1. Lie on your abdomen on a firm surface, such as a bed or padded floor.  2. Bend your left / right knee and hold " your ankle. If you cannot reach your ankle or pant leg, loop a belt around your foot and grab the belt instead.  3. Gently pull your heel toward your buttocks. Your knee should not slide out to the side. You should feel a stretch in the front of your thigh and knee (quadriceps).  4. Hold this position for __________ seconds.  Repeat __________ times. Complete this exercise __________ times a day.  Hamstring, supine  1. Lie on your back (supine position).  2. Loop a belt or towel over the ball of your left / right foot. The ball of your foot is on the walking surface, right under your toes.  3. Straighten your left / right knee and slowly pull on the belt to raise your leg until you feel a gentle stretch behind your knee (hamstring).  ? Do not let your knee bend while you do this.  ? Keep your other leg flat on the floor.  4. Hold this position for __________ seconds.  Repeat __________ times. Complete this exercise __________ times a day.  Strengthening exercises  These exercises build strength and endurance in your knee. Endurance is the ability to use your muscles for a long time, even after they get tired.  Quadriceps, isometric  This exercise stretches the muscles in front of your thigh (quadriceps) without moving your knee joint (isometric).  1. Lie on your back with your left / right leg extended and your other knee bent. Put a rolled towel or small pillow under your knee if told by your health care provider.  2. Slowly tense the muscles in the front of your left / right thigh. You should see your kneecap slide up toward your hip or see increased dimpling just above the knee. This motion will push the back of the knee toward the floor.  3. For __________ seconds, hold the muscle as tight as you can without increasing your pain.  4. Relax the muscles slowly and completely.  Repeat __________ times. Complete this exercise __________ times a day.  Straight leg raises  This exercise stretches the muscles in front  "of your thigh (quadriceps) and the muscles that move your hips (hip flexors).  1. Lie on your back with your left / right leg extended and your other knee bent.  2. Tense the muscles in the front of your left / right thigh. You should see your kneecap slide up or see increased dimpling just above the knee. Your thigh may even shake a bit.  3. Keep these muscles tight as you raise your leg 4-6 inches (10-15 cm) off the floor. Do not let your knee bend.  4. Hold this position for __________ seconds.  5. Keep these muscles tense as you lower your leg.  6. Relax your muscles slowly and completely after each repetition.  Repeat __________ times. Complete this exercise __________ times a day.  Hamstring, isometric  1. Lie on your back on a firm surface.  2. Bend your left / right knee about __________ degrees.  3. Dig your left / right heel into the surface as if you are trying to pull it toward your buttocks. Tighten the muscles in the back of your thighs (hamstring) to \"dig\" as hard as you can without increasing any pain.  4. Hold this position for __________ seconds.  5. Release the tension gradually and allow your muscles to relax completely for __________ seconds after each repetition.  Repeat __________ times. Complete this exercise __________ times a day.  Hamstring curls  If told by your health care provider, do this exercise while wearing ankle weights. Begin with __________ lb weights. Then increase the weight by 1 lb (0.5 kg) increments. Do not wear ankle weights that are more than __________ lb.  1. Lie on your abdomen with your legs straight.  2. Bend your left / right knee as far as you can without feeling pain. Keep your hips flat against the floor.  3. Hold this position for __________ seconds.  4. Slowly lower your leg to the starting position.  Repeat __________ times. Complete this exercise __________ times a day.  Squats  This exercise strengthens the muscles in front of your thigh and knee " (quadriceps).  1.  front of a table, with your feet and knees pointing straight ahead. You may rest your hands on the table for balance but not for support.  2. Slowly bend your knees and lower your hips like you are going to sit in a chair.  ? Keep your weight over your heels, not over your toes.  ? Keep your lower legs upright so they are parallel with the table legs.  ? Do not let your hips go lower than your knees.  ? Do not bend lower than told by your health care provider.  ? If your knee pain increases, do not bend as low.  3. Hold the squat position for __________ seconds.  4. Slowly push with your legs to return to standing. Do not use your hands to pull yourself to standing.  Repeat __________ times. Complete this exercise __________ times a day.  Wall slides  This exercise strengthens the muscles in front of your thigh and knee (quadriceps).  1. Lean your back against a smooth wall or door, and walk your feet out 18-24 inches (46-61 cm) from it.  2. Place your feet hip-width apart.  3. Slowly slide down the wall or door until your knees bend __________ degrees. Keep your knees over your heels, not over your toes. Keep your knees in line with your hips.  4. Hold this position for __________ seconds.  Repeat __________ times. Complete this exercise __________ times a day.  Straight leg raises  This exercise strengthens the muscles that rotate the leg at the hip and move it away from your body (hip abductors).  1. Lie on your side with your left / right leg in the top position. Lie so your head, shoulder, knee, and hip line up. You may bend your bottom knee to help you keep your balance.  2. Roll your hips slightly forward so your hips are stacked directly over each other and your left / right knee is facing forward.  3. Leading with your heel, lift your top leg 4-6 inches (10-15 cm). You should feel the muscles in your outer hip lifting.  ? Do not let your foot drift forward.  ? Do not let your knee  roll toward the ceiling.  4. Hold this position for __________ seconds.  5. Slowly return your leg to the starting position.  6. Let your muscles relax completely after each repetition.  Repeat __________ times. Complete this exercise __________ times a day.  Straight leg raises  This exercise stretches the muscles that move your hips away from the front of the pelvis (hip extensors).  1. Lie on your abdomen on a firm surface. You can put a pillow under your hips if that is more comfortable.  2. Tense the muscles in your buttocks and lift your left / right leg about 4-6 inches (10-15 cm). Keep your knee straight as you lift your leg.  3. Hold this position for __________ seconds.  4. Slowly lower your leg to the starting position.  5. Let your leg relax completely after each repetition.  Repeat __________ times. Complete this exercise __________ times a day.  This information is not intended to replace advice given to you by your health care provider. Make sure you discuss any questions you have with your health care provider.  Document Released: 11/01/2006 Document Revised: 10/08/2019 Document Reviewed: 10/08/2019  Elsevier Patient Education © 2020 [employer]

## 2020-09-23 NOTE — TELEPHONE ENCOUNTER
----- Message from ZENIA Zapata sent at 9/21/2020  3:44 PM EDT -----  Please call the patient and let her know her ultrasound of the liver looks good.  It still shows cirrhosis but no signs of any type of cancer.  Thanks

## 2020-10-01 DIAGNOSIS — M25.562 ACUTE PAIN OF LEFT KNEE: ICD-10-CM

## 2020-10-02 RX ORDER — MELOXICAM 15 MG/1
TABLET ORAL
Qty: 30 TABLET | Refills: 0 | Status: SHIPPED | OUTPATIENT
Start: 2020-10-02 | End: 2020-10-19

## 2020-10-05 ENCOUNTER — TELEPHONE (OUTPATIENT)
Dept: FAMILY MEDICINE CLINIC | Facility: CLINIC | Age: 55
End: 2020-10-05

## 2020-10-05 NOTE — TELEPHONE ENCOUNTER
Unfortunately, there are so many things wrong with the situation.  Firstly, the patient took an overdose of meloxicam for an undetermined period of time.  The patient has a history of liver failure and taking more medication than is recommended could be fatal in this patient.  I would not recommend that she take either ibuprofen or meloxicam at this time.  If she needs something else for pain she can use ice packs topically for the next 2 weeks.  She should not take any other OTC pain medications by mouth during this time.  It is imperative in the future that the patient closely follow the instructions on her medications.  (Also, ibuprofen comes in 200 mg tablets over-the-counter, not 300 mg tablets)

## 2020-10-05 NOTE — TELEPHONE ENCOUNTER
Pt boyfriend called stating she needed more meloxicam. Stated she was out.  Stated pt was taking it twice daily and her pills were gone.  I advised him that it was ordered for once daily.  He stated that she was taking it twice daily.  I informed him that was not how it was written, it was written for once daily and even if she was taking it twice daily she should have still had a 12 day supply.  He then stated that she was in the ER over the weekend and they told her to take Ibuprofen 600 mg, but they didn't write her a RX.   I advised him that if they didn't write her a RX for the ibuprofen they could buy ibuprofen 300 mg and take 2.

## 2020-10-12 RX ORDER — LACTULOSE 10 G/15ML
SOLUTION ORAL; RECTAL
Qty: 473 ML | Refills: 1 | Status: SHIPPED | OUTPATIENT
Start: 2020-10-12 | End: 2020-11-05 | Stop reason: SDUPTHER

## 2020-10-19 ENCOUNTER — OFFICE VISIT (OUTPATIENT)
Dept: FAMILY MEDICINE CLINIC | Facility: CLINIC | Age: 55
End: 2020-10-19

## 2020-10-19 VITALS
HEIGHT: 66 IN | HEART RATE: 88 BPM | SYSTOLIC BLOOD PRESSURE: 142 MMHG | TEMPERATURE: 96.8 F | OXYGEN SATURATION: 98 % | WEIGHT: 293 LBS | DIASTOLIC BLOOD PRESSURE: 66 MMHG | BODY MASS INDEX: 47.09 KG/M2

## 2020-10-19 DIAGNOSIS — I10 ESSENTIAL HYPERTENSION: ICD-10-CM

## 2020-10-19 DIAGNOSIS — K21.9 GASTROESOPHAGEAL REFLUX DISEASE WITHOUT ESOPHAGITIS: ICD-10-CM

## 2020-10-19 DIAGNOSIS — E11.9 TYPE 2 DIABETES MELLITUS WITHOUT COMPLICATION, WITHOUT LONG-TERM CURRENT USE OF INSULIN (HCC): ICD-10-CM

## 2020-10-19 DIAGNOSIS — Z79.899 ENCOUNTER FOR LONG-TERM (CURRENT) USE OF MEDICATIONS: ICD-10-CM

## 2020-10-19 DIAGNOSIS — K74.4 SECONDARY BILIARY CIRRHOSIS (HCC): ICD-10-CM

## 2020-10-19 DIAGNOSIS — R60.9 EDEMA, UNSPECIFIED TYPE: Primary | ICD-10-CM

## 2020-10-19 LAB — HBA1C MFR BLD: 4.9 %

## 2020-10-19 PROCEDURE — 83036 HEMOGLOBIN GLYCOSYLATED A1C: CPT | Performed by: FAMILY MEDICINE

## 2020-10-19 PROCEDURE — 99214 OFFICE O/P EST MOD 30 MIN: CPT | Performed by: FAMILY MEDICINE

## 2020-10-19 NOTE — PROGRESS NOTES
Subjective   Sameera Stapleton is a 55 y.o. female.     Chief Complaint   Patient presents with   • Knee Pain       Patient is here to follow-up from the emergency room.  She was instructed to go to the emergency room after her sister had contacted the liver specialist because the patient's left lower extremity was swelling.  She was instructed to go to the ER to make sure it was not a clot.  There was no DVT found and the diagnosis was just edema and she was given compression stockings.  Patient states that her knee pain is getting better.  She is here today with her sister who explains that Sameera was taking the meloxicam 15 mg twice daily and sometimes more often than that.  That is why she ran out of it too soon.  This may have also contributed to her lower extremity edema.  We had a long discussion about follow the instructions on every medication bottle especially since she has cirrhosis and avoiding any over-the-counter medications unless authorized by myself or her hepatologist.    Patient is also here to follow-up on her chronic health conditions:  GERD.  The patient takes pantoprazole 40 mg daily.  She states that helps with her reflux.  Hypertension.  The patient is compliant with taking lisinopril 40 mg daily.  She denies any side effects.  She states that her blood pressures have been under good control.  Hyperlipidemia.  The patient is compliant with taking atorvastatin 10 mg daily and denies any side effects.  Type 2 diabetes.  The patient is compliant with taking metformin 500 mg once daily.  She denies any side effects and states that her blood sugars have been under good control.  Depression.  The patient currently takes fluoxetine 20 mg daily.  She feels that is helping with her mood.         Review of Systems   Constitutional: Negative for activity change, chills, fatigue and fever.   HENT: Negative for hearing loss, swollen glands, tinnitus and trouble swallowing.    Eyes: Negative for pain and visual  disturbance.   Respiratory: Negative for cough and shortness of breath.    Cardiovascular: Negative for chest pain, palpitations and leg swelling.   Gastrointestinal: Negative for diarrhea and nausea.   Endocrine: Negative for polydipsia and polyuria.   Genitourinary: Negative for difficulty urinating and urinary incontinence.   Musculoskeletal: Positive for arthralgias (Left knee). Negative for gait problem and joint swelling.   Skin: Negative for rash.   Allergic/Immunologic: Negative for immunocompromised state.   Neurological: Negative for dizziness, light-headedness and headache.   Hematological: Negative for adenopathy. Does not bruise/bleed easily.   Psychiatric/Behavioral: Negative for dysphoric mood and sleep disturbance.       The following portions of the patient's history were reviewed and updated as appropriate: allergies, current medications, past family history, past medical history, past social history, past surgical history and problem list.    Past Medical History:   Diagnosis Date   • Adjustment disorder with mixed anxiety and depressed mood    • Allergic rhinitis    • Anemia    • Anemia    • Elevated LFTs    • Encounter for long-term (current) use of medications    • GERD (gastroesophageal reflux disease)    • Hyperlipidemia    • Hypertension    • Leukopenia    • Overweight    • Type II diabetes mellitus (CMS/HCC)        Past Surgical History:   Procedure Laterality Date   • COLONOSCOPY N/A 5/16/2020    Procedure: COLONOSCOPY TO ASCENDING COLON;  Surgeon: Aaron Pandya MD;  Location: [facility] ENDOSCOPY;  Service: Gastroenterology;  Laterality: N/A;  ANEMIA  --ABORTED AT ASCENDING COLON, INTERNAL HEMORRHOIDS    • ENDOSCOPY N/A 5/16/2020    Procedure: ESOPHAGOGASTRODUODENOSCOPY;  Surgeon: Aaron Pandya MD;  Location: [facility] ENDOSCOPY;  Service: Gastroenterology;  Laterality: N/A;  ANEMIA, BELTRÁN, CIRRHOSIS   --GRADE 1 VARICES        Family History   Problem Relation Age of Onset   • Diabetes  "Other    • Hypertension Other        Social History     Socioeconomic History   • Marital status: Single     Spouse name: Not on file   • Number of children: Not on file   • Years of education: Not on file   • Highest education level: Not on file   Tobacco Use   • Smoking status: Never Smoker   • Smokeless tobacco: Never Used   Substance and Sexual Activity   • Alcohol use: No     Frequency: Never         Current Outpatient Medications:   •  ALBUTEROL SULFATE  (90 Base) MCG/ACT inhaler, INHALE 2 PUFFS BY MOUTH EVERY 4 HOURS AS NEEDED FOR WHEEZING, Disp: 18 g, Rfl: 2  •  Blood Glucose Monitoring Suppl w/Device kit, USE TO TEST BLOOD SUGAR ONCE DAILY. DX CODE E11.65  Indications: Type 2 Diabetes, Disp: 1 each, Rfl: 0  •  Enulose 10 GM/15ML solution solution (encephalopathy), TAKE 30 ML BY MOUTH EVERY DAY, Disp: 473 mL, Rfl: 1  •  FEROSUL 325 (65 Fe) MG tablet, TAKE ONE TABLET BY MOUTH ONCE DAILY WITH FOOD FOR ANEMIA. TAKE WITH STOOL SOFTENER AS NEEDED, Disp: 30 tablet, Rfl: 10  •  FLUoxetine (PROzac) 20 MG capsule, Take 1 capsule by mouth Daily., Disp: 90 capsule, Rfl: 0  •  glucose blood (FREESTYLE LITE) test strip, 1 each by Other route Daily. To check glucose, Disp: 100 each, Rfl: 2  •  Lancets misc, USE TO TEST BLOOD SUGAR TWICE DAILY. DX CODE E11.65  Indications: Type 2 Diabetes, Disp: 100 each, Rfl: 0  •  lisinopril (PRINIVIL,ZESTRIL) 40 MG tablet, TAKE 1 TABLET BY MOUTH EVERY DAY, Disp: 90 tablet, Rfl: 1  •  loratadine (CLARITIN) 10 MG tablet, TAKE 1 TABLET BY MOUTH EVERY DAY AS NEEDED FOR ALLERGIES, Disp: 90 tablet, Rfl: 2  •  pantoprazole (PROTONIX) 40 MG EC tablet, Take 1 tablet by mouth Daily., Disp: 90 tablet, Rfl: 1  •  riFAXIMin (Xifaxan) 550 MG tablet, Take 1 tablet by mouth Every 12 (Twelve) Hours., Disp: 60 tablet, Rfl: 11    Objective     Vitals:    10/19/20 1303   BP: 142/66   Pulse: 88   Temp: 96.8 °F (36 °C)   SpO2: 98%   Weight: (!) 139 kg (307 lb)   Height: 167.6 cm (66\")       Body mass " index is 49.55 kg/m².    No components found for: 2D    Physical Exam  Vitals signs and nursing note reviewed.   Constitutional:       Appearance: She is well-developed. She is obese.   HENT:      Head: Normocephalic and atraumatic.      Right Ear: External ear normal.      Left Ear: External ear normal.      Nose: Nose normal.   Eyes:      General: No scleral icterus.     Conjunctiva/sclera: Conjunctivae normal.   Neck:      Musculoskeletal: Normal range of motion and neck supple.   Cardiovascular:      Rate and Rhythm: Normal rate and regular rhythm.      Heart sounds: Normal heart sounds.   Pulmonary:      Effort: Pulmonary effort is normal.      Breath sounds: Normal breath sounds.   Musculoskeletal:      Right lower leg: Edema present.      Left lower leg: Edema present.   Skin:     General: Skin is warm and dry.      Findings: No rash.   Neurological:      General: No focal deficit present.      Mental Status: She is alert and oriented to person, place, and time.   Psychiatric:         Mood and Affect: Mood normal.         Behavior: Behavior normal.         Thought Content: Thought content normal.         Judgment: Judgment normal.       Recent labs from September 10, 2020 reviewed, stable    Procedures    Assessment/Plan   Diagnoses and all orders for this visit:    1. Edema, unspecified type (Primary)    2. Essential hypertension    3. Secondary biliary cirrhosis (CMS/HCC)    4. Encounter for long-term (current) use of medications    5. Type 2 diabetes mellitus without complication, without long-term current use of insulin (CMS/HCC)  -     POC Glycosylated Hemoglobin (Hb A1C)    6. Gastroesophageal reflux disease without esophagitis    follow up with GI in November.  Follow-up with me in 3 months.  Contact me sooner if any need arises before then.  No medication changes at this time.    There are no Patient Instructions on file for this visit.

## 2020-11-02 DIAGNOSIS — K21.9 GASTROESOPHAGEAL REFLUX DISEASE WITHOUT ESOPHAGITIS: ICD-10-CM

## 2020-11-02 DIAGNOSIS — Z79.899 ENCOUNTER FOR LONG-TERM (CURRENT) USE OF MEDICATIONS: ICD-10-CM

## 2020-11-02 RX ORDER — LACTULOSE 10 G/15ML
SOLUTION ORAL; RECTAL
Qty: 473 ML | Refills: 1 | OUTPATIENT
Start: 2020-11-02

## 2020-11-02 RX ORDER — PANTOPRAZOLE SODIUM 40 MG/1
40 TABLET, DELAYED RELEASE ORAL DAILY
Qty: 90 TABLET | Refills: 0 | Status: SHIPPED | OUTPATIENT
Start: 2020-11-02 | End: 2021-01-06

## 2020-11-02 RX ORDER — PANTOPRAZOLE SODIUM 40 MG/1
TABLET, DELAYED RELEASE ORAL
Qty: 30 TABLET | Refills: 1 | OUTPATIENT
Start: 2020-11-02

## 2020-11-05 RX ORDER — LACTULOSE 10 G/15ML
SOLUTION ORAL; RECTAL
Qty: 473 ML | Refills: 2 | Status: SHIPPED | OUTPATIENT
Start: 2020-11-05 | End: 2020-12-22

## 2020-11-19 ENCOUNTER — OFFICE VISIT (OUTPATIENT)
Dept: GASTROENTEROLOGY | Facility: CLINIC | Age: 55
End: 2020-11-19

## 2020-11-19 VITALS — WEIGHT: 291.4 LBS | BODY MASS INDEX: 46.83 KG/M2 | HEIGHT: 66 IN | TEMPERATURE: 96.8 F

## 2020-11-19 DIAGNOSIS — E66.01 MORBID OBESITY (HCC): ICD-10-CM

## 2020-11-19 DIAGNOSIS — K74.4 SECONDARY BILIARY CIRRHOSIS (HCC): Primary | ICD-10-CM

## 2020-11-19 DIAGNOSIS — I85.10 SECONDARY ESOPHAGEAL VARICES WITHOUT BLEEDING (HCC): ICD-10-CM

## 2020-11-19 DIAGNOSIS — D68.9 COAGULOPATHY (HCC): ICD-10-CM

## 2020-11-19 DIAGNOSIS — K76.82 HEPATIC ENCEPHALOPATHY (HCC): ICD-10-CM

## 2020-11-19 DIAGNOSIS — D50.8 OTHER IRON DEFICIENCY ANEMIA: ICD-10-CM

## 2020-11-19 LAB
ALBUMIN SERPL-MCNC: 2.9 G/DL (ref 3.5–5.2)
ALBUMIN/GLOB SERPL: 0.7 G/DL
ALP SERPL-CCNC: 137 U/L (ref 39–117)
ALT SERPL-CCNC: 24 U/L (ref 1–33)
AST SERPL-CCNC: 52 U/L (ref 1–32)
BASOPHILS # BLD AUTO: 0.02 10*3/MM3 (ref 0–0.2)
BASOPHILS NFR BLD AUTO: 0.5 % (ref 0–1.5)
BILIRUB SERPL-MCNC: 1.8 MG/DL (ref 0–1.2)
BUN SERPL-MCNC: 10 MG/DL (ref 6–20)
BUN/CREAT SERPL: 11.1 (ref 7–25)
CALCIUM SERPL-MCNC: 8.5 MG/DL (ref 8.6–10.5)
CHLORIDE SERPL-SCNC: 106 MMOL/L (ref 98–107)
CO2 SERPL-SCNC: 25.9 MMOL/L (ref 22–29)
CREAT SERPL-MCNC: 0.9 MG/DL (ref 0.57–1)
DIFFERENTIAL COMMENT: ABNORMAL
EOSINOPHIL # BLD AUTO: 0.12 10*3/MM3 (ref 0–0.4)
EOSINOPHIL # BLD MANUAL: 0.19 10*3/MM3 (ref 0–0.4)
EOSINOPHIL NFR BLD AUTO: 2.9 % (ref 0.3–6.2)
EOSINOPHIL NFR BLD MANUAL: 4.6 % (ref 0.3–6.2)
ERYTHROCYTE [DISTWIDTH] IN BLOOD BY AUTOMATED COUNT: 13 % (ref 12.3–15.4)
GLOBULIN SER CALC-MCNC: 4 GM/DL
GLUCOSE SERPL-MCNC: 137 MG/DL (ref 65–99)
HCT VFR BLD AUTO: 36.1 % (ref 34–46.6)
HGB BLD-MCNC: 12.6 G/DL (ref 12–15.9)
IMM GRANULOCYTES # BLD AUTO: 0.01 10*3/MM3 (ref 0–0.05)
IMM GRANULOCYTES NFR BLD AUTO: 0.2 % (ref 0–0.5)
INR PPP: 1.61 (ref 0.9–1.1)
LYMPHOCYTES # BLD AUTO: 0.7 10*3/MM3 (ref 0.7–3.1)
LYMPHOCYTES # BLD MANUAL: 0.33 10*3/MM3 (ref 0.7–3.1)
LYMPHOCYTES NFR BLD AUTO: 17.2 % (ref 19.6–45.3)
LYMPHOCYTES NFR BLD MANUAL: 8 % (ref 19.6–45.3)
MCH RBC QN AUTO: 35.6 PG (ref 26.6–33)
MCHC RBC AUTO-ENTMCNC: 34.9 G/DL (ref 31.5–35.7)
MCV RBC AUTO: 102 FL (ref 79–97)
MONOCYTES # BLD AUTO: 0.38 10*3/MM3 (ref 0.1–0.9)
MONOCYTES # BLD MANUAL: 0.23 10*3/MM3 (ref 0.1–0.9)
MONOCYTES NFR BLD AUTO: 9.3 % (ref 5–12)
MONOCYTES NFR BLD MANUAL: 5.7 % (ref 5–12)
NEUTROPHILS # BLD AUTO: 2.84 10*3/MM3 (ref 1.7–7)
NEUTROPHILS # BLD MANUAL: 3.32 10*3/MM3 (ref 1.7–7)
NEUTROPHILS NFR BLD AUTO: 69.9 % (ref 42.7–76)
NEUTROPHILS NFR BLD MANUAL: 81.6 % (ref 42.7–76)
PLATELET # BLD AUTO: 95 10*3/MM3 (ref 140–450)
PLATELET BLD QL SMEAR: ABNORMAL
POTASSIUM SERPL-SCNC: 4.1 MMOL/L (ref 3.5–5.2)
PROT SERPL-MCNC: 6.9 G/DL (ref 6–8.5)
PROTHROMBIN TIME: 18.9 SECONDS (ref 11.7–14.2)
RBC # BLD AUTO: 3.54 10*6/MM3 (ref 3.77–5.28)
RBC MORPH BLD: ABNORMAL
SODIUM SERPL-SCNC: 140 MMOL/L (ref 136–145)
WBC # BLD AUTO: 4.07 10*3/MM3 (ref 3.4–10.8)

## 2020-11-19 PROCEDURE — 99214 OFFICE O/P EST MOD 30 MIN: CPT | Performed by: INTERNAL MEDICINE

## 2020-11-19 RX ORDER — DOCUSATE SODIUM 100 MG/1
1 CAPSULE, LIQUID FILLED ORAL DAILY
COMMUNITY
Start: 2020-11-16 | End: 2021-01-01

## 2020-11-19 NOTE — PROGRESS NOTES
Chief Complaint   Patient presents with   • Follow-up   • Hepatic Disease       Subjective     HPI    Sameera Stapleton is a 55 y.o. 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    She is seen with her sister today in follow-up.  She says she is doing pretty well.  Her weight is down under 300 pounds.  She says she has been more active, helping her boyfriend with lots of household chores, cleaning    Endorses adherence to lactulose.  Averaging about 4 bowel movements per day.  Says she is eating better, watching sodium in her diet.  Denies issues with encephalopathy.  No lower extremity edema at this time.    Endorses normal energy levels, no rashes, no abdominal distention.    Today's visit was in the office.  Both the patient and I were wearing face masks and proper hand hygiene was performed before and after the physical exam.       Past Medical History:   Diagnosis Date   • Adjustment disorder with mixed anxiety and depressed mood    • Allergic rhinitis    • Anemia    • Anemia    • Elevated LFTs    • Encounter for long-term (current) use of medications    • GERD (gastroesophageal reflux disease)    • Hyperlipidemia    • Hypertension    • Leukopenia    • Overweight    • Type II diabetes mellitus (CMS/[facility])          Current Outpatient Medications:   •  ALBUTEROL SULFATE  (90 Base) MCG/ACT inhaler, INHALE 2 PUFFS BY MOUTH EVERY 4 HOURS AS NEEDED FOR WHEEZING, Disp: 18 g, Rfl: 2  •  Blood Glucose Monitoring Suppl w/Device kit, USE TO TEST BLOOD SUGAR ONCE DAILY. DX CODE E11.65  Indications: Type 2 Diabetes, Disp: 1 each, Rfl: 0  •  docusate sodium (COLACE) 100 MG capsule, Take 1 capsule by mouth Daily., Disp: , Rfl:   •  FEROSUL 325 (65 Fe) MG tablet, TAKE ONE TABLET BY MOUTH ONCE DAILY WITH FOOD FOR ANEMIA. TAKE WITH STOOL SOFTENER AS NEEDED, Disp: 30 tablet, Rfl: 10  •  FLUoxetine  (PROzac) 20 MG capsule, Take 1 capsule by mouth Daily., Disp: 90 capsule, Rfl: 0  •  glucose blood (FREESTYLE LITE) test strip, 1 each by Other route Daily. To check glucose, Disp: 100 each, Rfl: 2  •  lactulose (Enulose) 10 GM/15ML solution solution (encephalopathy), TAKE 30ML BY MOUTH EVERY DAY, Disp: 473 mL, Rfl: 2  •  Lancets misc, USE TO TEST BLOOD SUGAR TWICE DAILY. DX CODE E11.65  Indications: Type 2 Diabetes, Disp: 100 each, Rfl: 0  •  lisinopril (PRINIVIL,ZESTRIL) 40 MG tablet, TAKE 1 TABLET BY MOUTH EVERY DAY, Disp: 90 tablet, Rfl: 1  •  loratadine (CLARITIN) 10 MG tablet, TAKE 1 TABLET BY MOUTH EVERY DAY AS NEEDED FOR ALLERGIES, Disp: 90 tablet, Rfl: 2  •  pantoprazole (PROTONIX) 40 MG EC tablet, Take 1 tablet by mouth Daily., Disp: 90 tablet, Rfl: 0  •  riFAXIMin (Xifaxan) 550 MG tablet, Take 1 tablet by mouth Every 12 (Twelve) Hours., Disp: 60 tablet, Rfl: 11    No Known Allergies    Social History     Socioeconomic History   • Marital status: Single     Spouse name: Not on file   • Number of children: Not on file   • Years of education: Not on file   • Highest education level: Not on file   Tobacco Use   • Smoking status: Never Smoker   • Smokeless tobacco: Never Used   Substance and Sexual Activity   • Alcohol use: No     Frequency: Never   • Drug use: Never       Family History   Problem Relation Age of Onset   • Diabetes Other    • Hypertension Other        Review of Systems   Constitutional: Negative for activity change, appetite change and fatigue.   HENT: Negative for sore throat and trouble swallowing.    Respiratory: Negative.    Cardiovascular: Negative.    Gastrointestinal: Negative for abdominal distention, abdominal pain and blood in stool.        Loose stool with lactulose   Endocrine: Negative for cold intolerance and heat intolerance.   Genitourinary: Negative for difficulty urinating, dysuria and frequency.   Musculoskeletal: Positive for arthralgias. Negative for back pain and  myalgias.        Left knee pain   Skin: Negative.    Hematological: Negative for adenopathy. Does not bruise/bleed easily.   All other systems reviewed and are negative.      Objective     Vitals:    11/19/20 1033   Temp: 96.8 °F (36 °C)         11/19/20  1033   Weight: 132 kg (291 lb 6.4 oz)     Body mass index is 47.06 kg/m².    Physical Exam  Vitals signs reviewed.   Constitutional:       General: She is not in acute distress.     Appearance: Normal appearance. She is well-developed. She is obese. She is not diaphoretic.   HENT:      Head: Normocephalic and atraumatic.      Right Ear: External ear normal.      Left Ear: External ear normal.      Nose: Nose normal.   Eyes:      General: No scleral icterus.        Right eye: No discharge.         Left eye: No discharge.      Conjunctiva/sclera: Conjunctivae normal.   Neck:      Musculoskeletal: Normal range of motion and neck supple.      Thyroid: No thyromegaly.      Comments: No supraclavicular adenopathy  Cardiovascular:      Rate and Rhythm: Normal rate and regular rhythm.      Heart sounds: Normal heart sounds. No murmur. No gallop.       Comments: No lower extremity edema  Pulmonary:      Effort: Pulmonary effort is normal. No respiratory distress.      Breath sounds: Normal breath sounds. No wheezing.   Abdominal:      General: Bowel sounds are normal. There is no distension.      Palpations: Abdomen is soft. Abdomen is not rigid. There is no mass.      Tenderness: There is no abdominal tenderness. There is no guarding or rebound.      Hernia: No hernia is present.      Comments: Significant abdominal obesity   Genitourinary:     Comments: Rectal exam deferred  Musculoskeletal: Normal range of motion.         General: No tenderness.      Comments: No atrophy of upper or lower extremities.  Normal digits and nails of both hands.   Lymphadenopathy:      Cervical: No cervical adenopathy.   Skin:     General: Skin is warm and dry.      Findings: No erythema or  rash.   Neurological:      Mental Status: She is alert and oriented to person, place, and time.      Motor: No atrophy.      Coordination: Coordination normal.   Psychiatric:         Behavior: Behavior normal.         Thought Content: Thought content normal.         Judgment: Judgment normal.         WBC   Date Value Ref Range Status   09/10/2020 3.53 3.40 - 10.80 10*3/mm3 Final     RBC   Date Value Ref Range Status   09/10/2020 3.42 (L) 3.77 - 5.28 10*6/mm3 Final     Hemoglobin   Date Value Ref Range Status   09/10/2020 11.8 (L) 12.0 - 15.9 g/dL Final   05/18/2020 7.2 (L) 12.0 - 15.9 g/dL Final     Hematocrit   Date Value Ref Range Status   09/10/2020 34.0 34.0 - 46.6 % Final   05/18/2020 22.8 (L) 34.0 - 46.6 % Final     MCV   Date Value Ref Range Status   09/10/2020 99.4 (H) 79.0 - 97.0 fL Final   05/18/2020 85.1 79.0 - 97.0 fL Final     MCH   Date Value Ref Range Status   09/10/2020 34.5 (H) 26.6 - 33.0 pg Final   05/18/2020 26.9 26.6 - 33.0 pg Final     MCHC   Date Value Ref Range Status   09/10/2020 34.7 31.5 - 35.7 g/dL Final   05/18/2020 31.6 31.5 - 35.7 g/dL Final     RDW   Date Value Ref Range Status   09/10/2020 13.6 12.3 - 15.4 % Final   05/18/2020 14.3 12.3 - 15.4 % Final     RDW-SD   Date Value Ref Range Status   05/18/2020 43.6 37.0 - 54.0 fl Final     MPV   Date Value Ref Range Status   05/18/2020 11.2 6.0 - 12.0 fL Final     Platelets   Date Value Ref Range Status   09/10/2020 79 (L) 140 - 450 10*3/mm3 Final   05/18/2020 97 (L) 140 - 450 10*3/mm3 Final     Neutrophil Rel %   Date Value Ref Range Status   09/10/2020 CANCELED       Comment:     Test not performed    Result canceled by the ancillary.       Neutrophil %   Date Value Ref Range Status   05/18/2020 36.8 (L) 42.7 - 76.0 % Final     Lymphocyte Rel %   Date Value Ref Range Status   09/10/2020 CANCELED       Comment:     Test not performed    Result canceled by the ancillary.       Lymphocyte %   Date Value Ref Range Status   05/18/2020 38.4  19.6 - 45.3 % Final     Monocyte Rel %   Date Value Ref Range Status   09/10/2020 CANCELED       Comment:     Test not performed    Result canceled by the ancillary.       Monocyte %   Date Value Ref Range Status   05/18/2020 11.0 5.0 - 12.0 % Final     Eosinophil Rel %   Date Value Ref Range Status   09/10/2020 CANCELED       Comment:     Test not performed    Result canceled by the ancillary.     09/10/2020 5.6 0.3 - 6.2 % Final     Eosinophil %   Date Value Ref Range Status   05/18/2020 12.2 (H) 0.3 - 6.2 % Final     Basophil Rel %   Date Value Ref Range Status   08/21/2020 1.5 0.0 - 1.5 % Final     Basophil %   Date Value Ref Range Status   05/18/2020 1.2 0.0 - 1.5 % Final     Neutrophils Absolute   Date Value Ref Range Status   09/10/2020 2.35 1.70 - 7.00 10*3/mm3 Final   08/21/2020 1.50 (L) 1.70 - 7.00 10*3/mm3 Final     Neutrophils, Absolute   Date Value Ref Range Status   05/18/2020 0.94 (L) 1.70 - 7.00 10*3/mm3 Final     Lymphocytes Absolute   Date Value Ref Range Status   09/10/2020 CANCELED       Comment:     Test not performed    Result canceled by the ancillary.     09/10/2020 0.63 (L) 0.70 - 3.10 10*3/mm3 Final     Lymphocytes, Absolute   Date Value Ref Range Status   05/18/2020 0.98 0.70 - 3.10 10*3/mm3 Final     Monocytes Absolute   Date Value Ref Range Status   09/10/2020 0.31 0.10 - 0.90 10*3/mm3 Final   08/21/2020 0.23 0.10 - 0.90 10*3/mm3 Final     Monocytes, Absolute   Date Value Ref Range Status   05/18/2020 0.28 0.10 - 0.90 10*3/mm3 Final     Eosinophils Absolute   Date Value Ref Range Status   09/10/2020 CANCELED       Comment:     Test not performed    Result canceled by the ancillary.       Eosinophil Abs   Date Value Ref Range Status   09/10/2020 0.20 0.00 - 0.40 10*3/mm3 Final     Eosinophils, Absolute   Date Value Ref Range Status   05/18/2020 0.31 0.00 - 0.40 10*3/mm3 Final     Basophils Absolute   Date Value Ref Range Status   09/10/2020 CANCELED       Comment:     Test not  performed    Result canceled by the ancillary.     09/10/2020 0.04 0.00 - 0.20 10*3/mm3 Final     Basophils, Absolute   Date Value Ref Range Status   05/18/2020 0.03 0.00 - 0.20 10*3/mm3 Final     nRBC   Date Value Ref Range Status   08/21/2020 0.0 0.0 - 0.2 /100 WBC Final       Glucose   Date Value Ref Range Status   05/18/2020 96 65 - 99 mg/dL Final     Sodium   Date Value Ref Range Status   09/10/2020 142 136 - 145 mmol/L Final   05/18/2020 141 136 - 145 mmol/L Final     Potassium   Date Value Ref Range Status   09/10/2020 4.1 3.5 - 5.2 mmol/L Final   05/18/2020 3.6 3.5 - 5.2 mmol/L Final     CO2   Date Value Ref Range Status   05/18/2020 26.1 22.0 - 29.0 mmol/L Final     Total CO2   Date Value Ref Range Status   09/10/2020 25.0 22.0 - 29.0 mmol/L Final     Chloride   Date Value Ref Range Status   09/10/2020 109 (H) 98 - 107 mmol/L Final   05/18/2020 106 98 - 107 mmol/L Final     Anion Gap   Date Value Ref Range Status   05/18/2020 8.9 5.0 - 15.0 mmol/L Final     Creatinine   Date Value Ref Range Status   09/10/2020 0.84 0.57 - 1.00 mg/dL Final   05/18/2020 0.83 0.57 - 1.00 mg/dL Final     BUN   Date Value Ref Range Status   09/10/2020 8 6 - 20 mg/dL Final   05/18/2020 7 6 - 20 mg/dL Final     BUN/Creatinine Ratio   Date Value Ref Range Status   09/10/2020 9.5 7.0 - 25.0 Final   05/18/2020 8.4 7.0 - 25.0 Final     Calcium   Date Value Ref Range Status   09/10/2020 8.8 8.6 - 10.5 mg/dL Final   05/18/2020 7.7 (L) 8.6 - 10.5 mg/dL Final     eGFR Non  Am   Date Value Ref Range Status   09/10/2020 71 >60 mL/min/1.73 Final     eGFR Non  Amer   Date Value Ref Range Status   05/18/2020 72 >60 mL/min/1.73 Final     Alkaline Phosphatase   Date Value Ref Range Status   09/10/2020 132 (H) 39 - 117 U/L Final   05/18/2020 81 39 - 117 U/L Final     Total Protein   Date Value Ref Range Status   05/18/2020 6.0 6.0 - 8.5 g/dL Final     ALT (SGPT)   Date Value Ref Range Status   09/10/2020 21 1 - 33 U/L Final    05/18/2020 16 1 - 33 U/L Final     AST (SGOT)   Date Value Ref Range Status   09/10/2020 49 (H) 1 - 32 U/L Final   05/18/2020 42 (H) 1 - 32 U/L Final     Total Bilirubin   Date Value Ref Range Status   09/10/2020 1.7 (H) 0.0 - 1.2 mg/dL Final   05/18/2020 1.4 (H) 0.2 - 1.2 mg/dL Final     Albumin   Date Value Ref Range Status   09/10/2020 2.90 (L) 3.50 - 5.20 g/dL Final   05/18/2020 2.30 (L) 3.50 - 5.20 g/dL Final     Globulin   Date Value Ref Range Status   05/18/2020 3.7 gm/dL Final     A/G Ratio   Date Value Ref Range Status   09/10/2020 0.8 g/dL Final         RIGHT UPPER QUADRANT ULTRASOUND     HISTORY: BELTRÁN cirrhosis     COMPARISON: 05/14/2020     TECHNIQUE: Real time ultrasound imaging of the right upper quadrant was  performed. Static images reviewed.     FINDINGS: Pancreas not visualized due to overlying bowel gas. Coarsened  hepatic echotexture consistent with history of cirrhosis. Gallbladder is  contracted. Common duct measures about 6 mm. Right kidney measures about  11.5 cm in length and appears within normal limits.     IMPRESSION:  Coarsened hepatic echotexture consistent with history of cirrhosis.     This report was finalized on 9/21/2020 11:30 AM by Dr. Chuck Boyce M.D.      No notes on file    Assessment/Plan    1.  BELTR[place]N cirrhosis: Decompensated with HE, small varices    2.  Hepatic encephalopathy: Symptoms controlled on lactulose, rifaximin    3.  Iron deficiency anemia: Improved, on iron supplementation    4.  Coagulopathy: Secondary to #1    5.  Morbid obesity: Working on weight loss    6.  Esophageal varices: Small on exam, will plan for repeating EGD next year    Diagnoses and all orders for this visit:    1. Secondary biliary cirrhosis (CMS/HCC) (Primary)  -     Comprehensive Metabolic Panel  -     CBC & Differential  -     Protime-INR  -     US Liver; Future    2. Hepatic encephalopathy (CMS/HCC)  -     Comprehensive Metabolic Panel  -     CBC & Differential  -     Protime-INR  -      US Liver; Future    3. Coagulopathy (CMS/HCC)  -     Comprehensive Metabolic Panel  -     CBC & Differential  -     Protime-INR    4. Morbid obesity (CMS/HCC)  -     Comprehensive Metabolic Panel  -     CBC & Differential  -     Protime-INR    5. Secondary esophageal varices without bleeding (CMS/HCC)    6. Other iron deficiency anemia        I have discussed the above plan with the patient.  They verbalize understanding and are in agreement with the plan.  They have been advised to contact the office for any questions, concerns, or changes related to their health.    Dictated utilizing Dragon dictation

## 2020-11-20 DIAGNOSIS — I10 ESSENTIAL HYPERTENSION: ICD-10-CM

## 2020-11-20 RX ORDER — FERROUS SULFATE 325(65) MG
TABLET ORAL
Qty: 30 TABLET | Refills: 10 | Status: SHIPPED | OUTPATIENT
Start: 2020-11-20 | End: 2021-01-01

## 2020-11-20 RX ORDER — LISINOPRIL 40 MG/1
TABLET ORAL
Qty: 90 TABLET | Refills: 1 | Status: SHIPPED | OUTPATIENT
Start: 2020-11-20 | End: 2021-01-01 | Stop reason: SDUPTHER

## 2020-11-20 RX ORDER — LORATADINE 10 MG/1
TABLET ORAL
Qty: 90 TABLET | Refills: 2 | Status: SHIPPED | OUTPATIENT
Start: 2020-11-20 | End: 2021-01-01

## 2020-11-27 NOTE — PROGRESS NOTES
Blood glucose was 137.    Liver enzymes remain slightly elevated but are stable to prior checks.    Blood count is up to 12.6 which is good and normal.    Weight loss remain on the low side but this is consistent with her liver disease.    I will follow-up results of her liver ultrasound.  Follow-up as scheduled.

## 2020-12-01 ENCOUNTER — TELEPHONE (OUTPATIENT)
Dept: GASTROENTEROLOGY | Facility: CLINIC | Age: 55
End: 2020-12-01

## 2020-12-01 NOTE — TELEPHONE ENCOUNTER
Called pt and spoke with pt's sister Shawna (on [employer] form) and advised of Dr Smith note. Verb understanding.

## 2020-12-01 NOTE — TELEPHONE ENCOUNTER
----- Message from Bibiana Smith MD sent at 11/27/2020  1:04 PM EST -----  Blood glucose was 137.    Liver enzymes remain slightly elevated but are stable to prior checks.    Blood count is up to 12.6 which is good and normal.    Weight loss remain on the low side but this is consistent with her liver disease.    I will follow-up results of her liver ultrasound.  Follow-up as scheduled.

## 2020-12-16 ENCOUNTER — OFFICE VISIT (OUTPATIENT)
Dept: FAMILY MEDICINE CLINIC | Facility: CLINIC | Age: 55
End: 2020-12-16

## 2020-12-16 VITALS
BODY MASS INDEX: 47.09 KG/M2 | DIASTOLIC BLOOD PRESSURE: 80 MMHG | TEMPERATURE: 96.9 F | WEIGHT: 293 LBS | HEIGHT: 66 IN | SYSTOLIC BLOOD PRESSURE: 118 MMHG | HEART RATE: 102 BPM | OXYGEN SATURATION: 98 %

## 2020-12-16 DIAGNOSIS — N39.0 URINARY TRACT INFECTION WITH HEMATURIA, SITE UNSPECIFIED: Primary | ICD-10-CM

## 2020-12-16 DIAGNOSIS — R39.9 UTI SYMPTOMS: ICD-10-CM

## 2020-12-16 DIAGNOSIS — R31.9 URINARY TRACT INFECTION WITH HEMATURIA, SITE UNSPECIFIED: Primary | ICD-10-CM

## 2020-12-16 DIAGNOSIS — K72.90 LIVER FAILURE WITHOUT HEPATIC COMA, UNSPECIFIED CHRONICITY (HCC): ICD-10-CM

## 2020-12-16 LAB
BILIRUB BLD-MCNC: NEGATIVE MG/DL
GLUCOSE UR STRIP-MCNC: NEGATIVE MG/DL
KETONES UR QL: ABNORMAL
LEUKOCYTE EST, POC: NEGATIVE
NITRITE UR-MCNC: NEGATIVE MG/ML
PH UR: 6 [PH] (ref 5–8)
PROT UR STRIP-MCNC: ABNORMAL MG/DL
RBC # UR STRIP: ABNORMAL /UL
SP GR UR: 1.03 (ref 1–1.03)
UROBILINOGEN UR QL: ABNORMAL

## 2020-12-16 PROCEDURE — 99214 OFFICE O/P EST MOD 30 MIN: CPT | Performed by: FAMILY MEDICINE

## 2020-12-16 RX ORDER — NITROFURANTOIN 25; 75 MG/1; MG/1
100 CAPSULE ORAL 2 TIMES DAILY
Qty: 10 CAPSULE | Refills: 0 | Status: SHIPPED | OUTPATIENT
Start: 2020-12-16 | End: 2021-01-01

## 2020-12-16 NOTE — PROGRESS NOTES
Sreekanth Stapleton is a 55 y.o. female.     Chief Complaint   Patient presents with   • Urinary Frequency   • Urinary Urgency       Patient is here today with a new problem.  She said it started yesterday with frequency of urination.  She is also been having some pain prior to urination and at the end of her stream.  She denies any vomiting.  She is having about 3-4 bowel movements a day on the lactulose.  She last saw gastroenterology in November and they did labs and made no medication changes.  Her next appointment with GI is in March.       Review of Systems   Constitutional: Negative for activity change, chills, fatigue and fever.   HENT: Negative for hearing loss, swollen glands, tinnitus and trouble swallowing.    Eyes: Negative for pain and visual disturbance.   Respiratory: Negative for cough and shortness of breath.    Cardiovascular: Negative for chest pain, palpitations and leg swelling.   Gastrointestinal: Negative for diarrhea and nausea.   Endocrine: Negative for polydipsia and polyuria.   Genitourinary: Positive for dysuria and frequency. Negative for difficulty urinating and urinary incontinence.   Musculoskeletal: Negative for arthralgias, gait problem and joint swelling.   Skin: Negative for rash.   Allergic/Immunologic: Negative for immunocompromised state.   Neurological: Negative for dizziness, light-headedness and headache.   Hematological: Negative for adenopathy. Does not bruise/bleed easily.   Psychiatric/Behavioral: Negative for dysphoric mood and sleep disturbance.       The following portions of the patient's history were reviewed and updated as appropriate: allergies, current medications, past family history, past medical history, past social history, past surgical history and problem list.    Past Medical History:   Diagnosis Date   • Adjustment disorder with mixed anxiety and depressed mood    • Allergic rhinitis    • Anemia    • Anemia    • Elevated LFTs    • Encounter for  long-term (current) use of medications    • GERD (gastroesophageal reflux disease)    • Hyperlipidemia    • Hypertension    • Leukopenia    • Overweight    • Type II diabetes mellitus (CMS/HCC)        Past Surgical History:   Procedure Laterality Date   • COLONOSCOPY N/A 5/16/2020    Procedure: COLONOSCOPY TO ASCENDING COLON;  Surgeon: Aaron Pandya MD;  Location: [facility];  Service: Gastroenterology;  Laterality: N/A;  ANEMIA  --ABORTED AT ASCENDING COLON, INTERNAL HEMORRHOIDS    • ENDOSCOPY N/A 5/16/2020    Procedure: ESOPHAGOGASTRODUODENOSCOPY;  Surgeon: Aaron Pandya MD;  Location: [facility];  Service: Gastroenterology;  Laterality: N/A;  ANEMIA, BELTR[place]N, CIRRHOSIS   --GRADE 1 VARICES        Family History   Problem Relation Age of Onset   • Diabetes Other    • Hypertension Other        Social History     Socioeconomic History   • Marital status: Single     Spouse name: Not on file   • Number of children: Not on file   • Years of education: Not on file   • Highest education level: Not on file   Tobacco Use   • Smoking status: Never Smoker   • Smokeless tobacco: Never Used   Substance and Sexual Activity   • Alcohol use: No     Frequency: Never   • Drug use: Never         Current Outpatient Medications:   •  ALBUTEROL SULFATE  (90 Base) MCG/ACT inhaler, INHALE 2 PUFFS BY MOUTH EVERY 4 HOURS AS NEEDED FOR WHEEZING, Disp: 18 g, Rfl: 2  •  Blood Glucose Monitoring Suppl w/Device kit, USE TO TEST BLOOD SUGAR ONCE DAILY. DX CODE E11.65  Indications: Type 2 Diabetes, Disp: 1 each, Rfl: 0  •  docusate sodium (COLACE) 100 MG capsule, Take 1 capsule by mouth Daily., Disp: , Rfl:   •  FeroSul 325 (65 Fe) MG tablet, TAKE ONE TABLET BY MOUTH ONCE DAILY WITH FOOD FOR ANEMIA. TAKE WITH STOOL SOFTENER AS NEEDED, Disp: 30 tablet, Rfl: 10  •  FLUoxetine (PROzac) 20 MG capsule, Take 1 capsule by mouth Daily., Disp: 90 capsule, Rfl: 0  •  glucose blood (FREESTYLE LITE) test strip, 1 each by Other route  "Daily. To check glucose, Disp: 100 each, Rfl: 2  •  lactulose (Enulose) 10 GM/15ML solution solution (encephalopathy), TAKE 30ML BY MOUTH EVERY DAY, Disp: 473 mL, Rfl: 2  •  Lancets misc, USE TO TEST BLOOD SUGAR TWICE DAILY. DX CODE E11.65  Indications: Type 2 Diabetes, Disp: 100 each, Rfl: 0  •  lisinopril (PRINIVIL,ZESTRIL) 40 MG tablet, TAKE 1 TABLET BY MOUTH EVERY DAY, Disp: 90 tablet, Rfl: 1  •  loratadine (CLARITIN) 10 MG tablet, TAKE 1 TABLET BY MOUTH AS NEEDED FOR ALLERGIES, Disp: 90 tablet, Rfl: 2  •  pantoprazole (PROTONIX) 40 MG EC tablet, Take 1 tablet by mouth Daily., Disp: 90 tablet, Rfl: 0  •  riFAXIMin (Xifaxan) 550 MG tablet, Take 1 tablet by mouth Every 12 (Twelve) Hours., Disp: 60 tablet, Rfl: 11  •  nitrofurantoin, macrocrystal-monohydrate, (Macrobid) 100 MG capsule, Take 1 capsule by mouth 2 (Two) Times a Day., Disp: 10 capsule, Rfl: 0    Objective     Vitals:    12/16/20 1438   BP: 118/80   Pulse: 102   Temp: 96.9 °F (36.1 °C)   SpO2: 98%   Weight: 136 kg (298 lb 12.8 oz)   Height: 167.6 cm (65.98\")       Body mass index is 48.26 kg/m².    No components found for: 2D    Physical Exam  Vitals signs and nursing note reviewed.   Constitutional:       Appearance: Normal appearance. She is well-developed. She is obese.   HENT:      Head: Normocephalic and atraumatic.   Eyes:      General: Scleral icterus (slightly icteric) present.   Neck:      Musculoskeletal: Normal range of motion and neck supple.   Cardiovascular:      Rate and Rhythm: Normal rate and regular rhythm.      Heart sounds: Murmur (2/6 RAMSES ) present.   Pulmonary:      Effort: Pulmonary effort is normal.      Breath sounds: Normal breath sounds.   Abdominal:      General: Bowel sounds are normal.      Palpations: Abdomen is soft.      Tenderness: There is no right CVA tenderness or left CVA tenderness.   Musculoskeletal: Normal range of motion.   Skin:     General: Skin is warm and dry.      Capillary Refill: Capillary refill takes " less than 2 seconds.      Findings: No rash.   Neurological:      General: No focal deficit present.      Mental Status: She is alert. Mental status is at baseline.   Psychiatric:         Mood and Affect: Mood normal.         Behavior: Behavior normal.         Procedures    Assessment/Plan   Diagnoses and all orders for this visit:    1. Urinary tract infection with hematuria, site unspecified (Primary)  -     nitrofurantoin, macrocrystal-monohydrate, (Macrobid) 100 MG capsule; Take 1 capsule by mouth 2 (Two) Times a Day.  Dispense: 10 capsule; Refill: 0    2. UTI symptoms  -     POC Urinalysis Dipstick, Automated    3. Liver failure without hepatic coma, unspecified chronicity (CMS/HCC)    Culture was sent.  The patient was advised to return to the office sooner if her symptoms are not improving.  I will use Macrobid which has no issues with liver disease.    There are no Patient Instructions on file for this visit.

## 2020-12-18 LAB
BACTERIA UR CULT: NORMAL
BACTERIA UR CULT: NORMAL

## 2020-12-22 RX ORDER — LACTULOSE 10 G/15ML
SOLUTION ORAL; RECTAL
Qty: 473 ML | Refills: 2 | Status: SHIPPED | OUTPATIENT
Start: 2020-12-22 | End: 2021-01-01 | Stop reason: SDUPTHER

## 2021-01-01 ENCOUNTER — OFFICE VISIT (OUTPATIENT)
Dept: FAMILY MEDICINE CLINIC | Facility: CLINIC | Age: 56
End: 2021-01-01

## 2021-01-01 ENCOUNTER — LAB (OUTPATIENT)
Dept: LAB | Facility: HOSPITAL | Age: 56
End: 2021-01-01

## 2021-01-01 ENCOUNTER — TELEPHONE (OUTPATIENT)
Dept: GASTROENTEROLOGY | Facility: CLINIC | Age: 56
End: 2021-01-01

## 2021-01-01 ENCOUNTER — TELEPHONE (OUTPATIENT)
Dept: FAMILY MEDICINE CLINIC | Facility: CLINIC | Age: 56
End: 2021-01-01

## 2021-01-01 ENCOUNTER — OFFICE VISIT (OUTPATIENT)
Dept: GASTROENTEROLOGY | Facility: CLINIC | Age: 56
End: 2021-01-01

## 2021-01-01 ENCOUNTER — TRANSCRIBE ORDERS (OUTPATIENT)
Dept: SLEEP MEDICINE | Facility: HOSPITAL | Age: 56
End: 2021-01-01

## 2021-01-01 ENCOUNTER — HOSPITAL ENCOUNTER (OUTPATIENT)
Dept: MRI IMAGING | Facility: HOSPITAL | Age: 56
Discharge: HOME | End: 2021-10-23
Admitting: INTERNAL MEDICINE

## 2021-01-01 ENCOUNTER — HOSPITAL ENCOUNTER (OUTPATIENT)
Dept: ULTRASOUND IMAGING | Facility: HOSPITAL | Age: 56
Discharge: HOME | End: 2021-03-01

## 2021-01-01 ENCOUNTER — HOSPITAL ENCOUNTER (OUTPATIENT)
Dept: MRI IMAGING | Facility: HOSPITAL | Age: 56
Discharge: HOME | End: 2021-09-30

## 2021-01-01 ENCOUNTER — HOSPITAL ENCOUNTER (OUTPATIENT)
Dept: ULTRASOUND IMAGING | Facility: HOSPITAL | Age: 56
Discharge: HOME | End: 2021-07-06
Admitting: INTERNAL MEDICINE

## 2021-01-01 ENCOUNTER — TRANSCRIBE ORDERS (OUTPATIENT)
Dept: ADMINISTRATIVE | Facility: HOSPITAL | Age: 56
End: 2021-01-01

## 2021-01-01 VITALS
HEIGHT: 66 IN | DIASTOLIC BLOOD PRESSURE: 70 MMHG | TEMPERATURE: 96.6 F | WEIGHT: 284.7 LBS | SYSTOLIC BLOOD PRESSURE: 124 MMHG | BODY MASS INDEX: 45.76 KG/M2

## 2021-01-01 VITALS
OXYGEN SATURATION: 99 % | HEIGHT: 64 IN | WEIGHT: 293 LBS | SYSTOLIC BLOOD PRESSURE: 110 MMHG | DIASTOLIC BLOOD PRESSURE: 70 MMHG | TEMPERATURE: 98.7 F | HEART RATE: 103 BPM | BODY MASS INDEX: 50.02 KG/M2

## 2021-01-01 VITALS
BODY MASS INDEX: 50.02 KG/M2 | HEIGHT: 64 IN | DIASTOLIC BLOOD PRESSURE: 75 MMHG | WEIGHT: 293 LBS | SYSTOLIC BLOOD PRESSURE: 138 MMHG

## 2021-01-01 VITALS
TEMPERATURE: 97.8 F | SYSTOLIC BLOOD PRESSURE: 112 MMHG | OXYGEN SATURATION: 100 % | BODY MASS INDEX: 47.09 KG/M2 | WEIGHT: 293 LBS | HEART RATE: 98 BPM | DIASTOLIC BLOOD PRESSURE: 58 MMHG | HEIGHT: 66 IN

## 2021-01-01 VITALS
TEMPERATURE: 97.7 F | OXYGEN SATURATION: 99 % | HEIGHT: 64 IN | HEART RATE: 103 BPM | WEIGHT: 293 LBS | DIASTOLIC BLOOD PRESSURE: 72 MMHG | SYSTOLIC BLOOD PRESSURE: 136 MMHG | BODY MASS INDEX: 50.02 KG/M2

## 2021-01-01 VITALS — WEIGHT: 293 LBS | BODY MASS INDEX: 47.09 KG/M2 | HEIGHT: 66 IN

## 2021-01-01 VITALS
TEMPERATURE: 96 F | DIASTOLIC BLOOD PRESSURE: 70 MMHG | BODY MASS INDEX: 50.02 KG/M2 | SYSTOLIC BLOOD PRESSURE: 130 MMHG | WEIGHT: 293 LBS | HEART RATE: 97 BPM | OXYGEN SATURATION: 99 % | HEIGHT: 64 IN

## 2021-01-01 DIAGNOSIS — K75.81 LIVER CIRRHOSIS SECONDARY TO NASH (HCC): Primary | ICD-10-CM

## 2021-01-01 DIAGNOSIS — E11.9 TYPE 2 DIABETES MELLITUS WITHOUT COMPLICATION, WITHOUT LONG-TERM CURRENT USE OF INSULIN (HCC): Primary | ICD-10-CM

## 2021-01-01 DIAGNOSIS — D69.6 THROMBOCYTOPENIA (HCC): ICD-10-CM

## 2021-01-01 DIAGNOSIS — K75.81 LIVER CIRRHOSIS SECONDARY TO NASH (HCC): Primary | Chronic | ICD-10-CM

## 2021-01-01 DIAGNOSIS — Z79.899 ENCOUNTER FOR LONG-TERM (CURRENT) USE OF MEDICATIONS: ICD-10-CM

## 2021-01-01 DIAGNOSIS — I85.10 SECONDARY ESOPHAGEAL VARICES WITHOUT BLEEDING (HCC): ICD-10-CM

## 2021-01-01 DIAGNOSIS — E66.01 MORBID OBESITY (HCC): Chronic | ICD-10-CM

## 2021-01-01 DIAGNOSIS — K86.9 PANCREATIC LESION: ICD-10-CM

## 2021-01-01 DIAGNOSIS — K76.82 HEPATIC ENCEPHALOPATHY (HCC): Chronic | ICD-10-CM

## 2021-01-01 DIAGNOSIS — K72.90 LIVER FAILURE WITHOUT HEPATIC COMA, UNSPECIFIED CHRONICITY (HCC): Chronic | ICD-10-CM

## 2021-01-01 DIAGNOSIS — K74.60 LIVER CIRRHOSIS SECONDARY TO NASH (HCC): Primary | ICD-10-CM

## 2021-01-01 DIAGNOSIS — Z01.818 OTHER SPECIFIED PRE-OPERATIVE EXAMINATION: Primary | ICD-10-CM

## 2021-01-01 DIAGNOSIS — I10 ESSENTIAL HYPERTENSION: Chronic | ICD-10-CM

## 2021-01-01 DIAGNOSIS — K21.9 GASTROESOPHAGEAL REFLUX DISEASE WITHOUT ESOPHAGITIS: ICD-10-CM

## 2021-01-01 DIAGNOSIS — K74.60 LIVER CIRRHOSIS SECONDARY TO NASH (HCC): Primary | Chronic | ICD-10-CM

## 2021-01-01 DIAGNOSIS — R18.8 OTHER ASCITES: ICD-10-CM

## 2021-01-01 DIAGNOSIS — K75.81 LIVER CIRRHOSIS SECONDARY TO NASH (HCC): ICD-10-CM

## 2021-01-01 DIAGNOSIS — K74.60 LIVER CIRRHOSIS SECONDARY TO NASH (HCC): Chronic | ICD-10-CM

## 2021-01-01 DIAGNOSIS — F79 INTELLECTUAL DISABILITY: ICD-10-CM

## 2021-01-01 DIAGNOSIS — A40.9 SEPSIS DUE TO STREPTOCOCCUS SPECIES WITHOUT ACUTE ORGAN DYSFUNCTION (HCC): Primary | ICD-10-CM

## 2021-01-01 DIAGNOSIS — Z20.822 ENCOUNTER FOR PREOPERATIVE SCREENING LABORATORY TESTING FOR COVID-19 VIRUS: Primary | ICD-10-CM

## 2021-01-01 DIAGNOSIS — Z01.818 OTHER SPECIFIED PRE-OPERATIVE EXAMINATION: ICD-10-CM

## 2021-01-01 DIAGNOSIS — K74.4 SECONDARY BILIARY CIRRHOSIS (HCC): ICD-10-CM

## 2021-01-01 DIAGNOSIS — K76.82 HEPATIC ENCEPHALOPATHY (HCC): ICD-10-CM

## 2021-01-01 DIAGNOSIS — K76.82 HEPATIC ENCEPHALOPATHY (HCC): Primary | ICD-10-CM

## 2021-01-01 DIAGNOSIS — K76.82 HEPATIC ENCEPHALOPATHY: ICD-10-CM

## 2021-01-01 DIAGNOSIS — E11.9 TYPE 2 DIABETES MELLITUS WITHOUT COMPLICATION, WITHOUT LONG-TERM CURRENT USE OF INSULIN (HCC): ICD-10-CM

## 2021-01-01 DIAGNOSIS — F43.23 ADJUSTMENT DISORDER WITH MIXED ANXIETY AND DEPRESSED MOOD: ICD-10-CM

## 2021-01-01 DIAGNOSIS — K74.60 LIVER CIRRHOSIS SECONDARY TO NASH (HCC): ICD-10-CM

## 2021-01-01 DIAGNOSIS — I10 ESSENTIAL HYPERTENSION: ICD-10-CM

## 2021-01-01 DIAGNOSIS — E11.9 TYPE 2 DIABETES MELLITUS WITHOUT COMPLICATION, WITHOUT LONG-TERM CURRENT USE OF INSULIN (HCC): Chronic | ICD-10-CM

## 2021-01-01 DIAGNOSIS — E78.2 MIXED HYPERLIPIDEMIA: ICD-10-CM

## 2021-01-01 DIAGNOSIS — I85.10 SECONDARY ESOPHAGEAL VARICES WITHOUT BLEEDING (HCC): Chronic | ICD-10-CM

## 2021-01-01 DIAGNOSIS — K75.81 LIVER CIRRHOSIS SECONDARY TO NASH (HCC): Chronic | ICD-10-CM

## 2021-01-01 DIAGNOSIS — E11.9 TYPE 2 DIABETES MELLITUS WITHOUT COMPLICATION, WITHOUT LONG-TERM CURRENT USE OF INSULIN (HCC): Primary | Chronic | ICD-10-CM

## 2021-01-01 DIAGNOSIS — Z01.812 ENCOUNTER FOR PREOPERATIVE SCREENING LABORATORY TESTING FOR COVID-19 VIRUS: Primary | ICD-10-CM

## 2021-01-01 DIAGNOSIS — D69.6 THROMBOCYTOPENIA (HCC): Chronic | ICD-10-CM

## 2021-01-01 DIAGNOSIS — E78.2 MIXED HYPERLIPIDEMIA: Chronic | ICD-10-CM

## 2021-01-01 LAB
AFP-TM SERPL-MCNC: 5.7 NG/ML (ref 0–8.3)
ALBUMIN SERPL-MCNC: 2.3 G/DL (ref 3.8–4.9)
ALBUMIN SERPL-MCNC: 2.8 G/DL (ref 3.5–5.2)
ALBUMIN SERPL-MCNC: 2.8 G/DL (ref 3.8–4.9)
ALBUMIN/GLOB SERPL: 0.6 {RATIO} (ref 1.2–2.2)
ALBUMIN/GLOB SERPL: 0.7 G/DL
ALBUMIN/GLOB SERPL: 0.7 {RATIO} (ref 1.2–2.2)
ALP SERPL-CCNC: 149 IU/L (ref 48–121)
ALP SERPL-CCNC: 158 U/L (ref 39–117)
ALP SERPL-CCNC: 168 IU/L (ref 48–121)
ALT SERPL-CCNC: 38 IU/L (ref 0–32)
ALT SERPL-CCNC: 42 U/L (ref 1–33)
ALT SERPL-CCNC: 48 IU/L (ref 0–32)
AMMONIA PLAS-MCNC: 151 UG/DL (ref 34–178)
AST SERPL-CCNC: 76 IU/L (ref 0–40)
AST SERPL-CCNC: 82 U/L (ref 1–32)
AST SERPL-CCNC: 95 IU/L (ref 0–40)
BASOPHILS # BLD AUTO: 0 X10E3/UL (ref 0–0.2)
BASOPHILS # BLD AUTO: 0 X10E3/UL (ref 0–0.2)
BASOPHILS # BLD AUTO: 0.1 X10E3/UL (ref 0–0.2)
BASOPHILS NFR BLD AUTO: 1 %
BILIRUB SERPL-MCNC: 3.6 MG/DL (ref 0–1.2)
BILIRUB SERPL-MCNC: 3.9 MG/DL (ref 0–1.2)
BILIRUB SERPL-MCNC: 4.2 MG/DL (ref 0–1.2)
BUN SERPL-MCNC: 10 MG/DL (ref 6–24)
BUN SERPL-MCNC: 13 MG/DL (ref 6–20)
BUN SERPL-MCNC: 8 MG/DL (ref 6–24)
BUN/CREAT SERPL: 11 (ref 9–23)
BUN/CREAT SERPL: 13.5 (ref 7–25)
BUN/CREAT SERPL: 9 (ref 9–23)
CALCIUM SERPL-MCNC: 8.6 MG/DL (ref 8.7–10.2)
CALCIUM SERPL-MCNC: 8.7 MG/DL (ref 8.6–10.5)
CALCIUM SERPL-MCNC: 8.9 MG/DL (ref 8.7–10.2)
CHLORIDE SERPL-SCNC: 106 MMOL/L (ref 98–107)
CHLORIDE SERPL-SCNC: 110 MMOL/L (ref 96–106)
CHLORIDE SERPL-SCNC: 110 MMOL/L (ref 96–106)
CHOLEST SERPL-MCNC: 136 MG/DL (ref 0–200)
CO2 SERPL-SCNC: 24 MMOL/L (ref 20–29)
CO2 SERPL-SCNC: 25 MMOL/L (ref 20–29)
CO2 SERPL-SCNC: 26.5 MMOL/L (ref 22–29)
CREAT BLDA-MCNC: 1.2 MG/DL (ref 0.6–1.3)
CREAT SERPL-MCNC: 0.87 MG/DL (ref 0.57–1)
CREAT SERPL-MCNC: 0.94 MG/DL (ref 0.57–1)
CREAT SERPL-MCNC: 0.96 MG/DL (ref 0.57–1)
EOSINOPHIL # BLD AUTO: 0.2 X10E3/UL (ref 0–0.4)
EOSINOPHIL NFR BLD AUTO: 5 %
EOSINOPHIL NFR BLD AUTO: 5 %
EOSINOPHIL NFR BLD AUTO: 6 %
ERYTHROCYTE [DISTWIDTH] IN BLOOD BY AUTOMATED COUNT: 13 % (ref 11.7–15.4)
ERYTHROCYTE [DISTWIDTH] IN BLOOD BY AUTOMATED COUNT: 13 % (ref 11.7–15.4)
ERYTHROCYTE [DISTWIDTH] IN BLOOD BY AUTOMATED COUNT: 13.1 % (ref 11.7–15.4)
GLOBULIN SER CALC-MCNC: 4.1 G/DL (ref 1.5–4.5)
GLOBULIN SER CALC-MCNC: 4.1 G/DL (ref 1.5–4.5)
GLOBULIN SER CALC-MCNC: 4.3 GM/DL
GLUCOSE SERPL-MCNC: 108 MG/DL (ref 65–99)
GLUCOSE SERPL-MCNC: 138 MG/DL (ref 65–99)
GLUCOSE SERPL-MCNC: 92 MG/DL (ref 65–99)
HBA1C MFR BLD: 4.4 % (ref 4.8–5.6)
HBA1C MFR BLD: 4.8 %
HCT VFR BLD AUTO: 33.3 % (ref 34–46.6)
HCT VFR BLD AUTO: 34.5 % (ref 34–46.6)
HCT VFR BLD AUTO: 37.4 % (ref 34–46.6)
HDLC SERPL-MCNC: 48 MG/DL (ref 40–60)
HGB BLD-MCNC: 12 G/DL (ref 11.1–15.9)
HGB BLD-MCNC: 12.2 G/DL (ref 11.1–15.9)
HGB BLD-MCNC: 13.2 G/DL (ref 11.1–15.9)
IMM GRANULOCYTES # BLD AUTO: 0 X10E3/UL (ref 0–0.1)
IMM GRANULOCYTES NFR BLD AUTO: 0 %
INR PPP: 1.5 (ref 0.9–1.2)
LDLC SERPL CALC-MCNC: 66 MG/DL (ref 0–100)
LYMPHOCYTES # BLD AUTO: 0.7 X10E3/UL (ref 0.7–3.1)
LYMPHOCYTES # BLD AUTO: 0.8 X10E3/UL (ref 0.7–3.1)
LYMPHOCYTES # BLD AUTO: 0.9 X10E3/UL (ref 0.7–3.1)
LYMPHOCYTES NFR BLD AUTO: 18 %
LYMPHOCYTES NFR BLD AUTO: 24 %
LYMPHOCYTES NFR BLD AUTO: 26 %
MAGNESIUM SERPL-MCNC: 1.7 MG/DL (ref 1.6–2.3)
MCH RBC QN AUTO: 36.3 PG (ref 26.6–33)
MCH RBC QN AUTO: 36.6 PG (ref 26.6–33)
MCH RBC QN AUTO: 37 PG (ref 26.6–33)
MCHC RBC AUTO-ENTMCNC: 35.3 G/DL (ref 31.5–35.7)
MCHC RBC AUTO-ENTMCNC: 35.4 G/DL (ref 31.5–35.7)
MCHC RBC AUTO-ENTMCNC: 36 G/DL (ref 31.5–35.7)
MCV RBC AUTO: 103 FL (ref 79–97)
MCV RBC AUTO: 103 FL (ref 79–97)
MCV RBC AUTO: 104 FL (ref 79–97)
MONOCYTES # BLD AUTO: 0.4 X10E3/UL (ref 0.1–0.9)
MONOCYTES NFR BLD AUTO: 10 %
MONOCYTES NFR BLD AUTO: 11 %
MONOCYTES NFR BLD AUTO: 11 %
MORPHOLOGY BLD-IMP: ABNORMAL
NEUTROPHILS # BLD AUTO: 1.9 X10E3/UL (ref 1.4–7)
NEUTROPHILS # BLD AUTO: 2 X10E3/UL (ref 1.4–7)
NEUTROPHILS # BLD AUTO: 2.6 X10E3/UL (ref 1.4–7)
NEUTROPHILS NFR BLD AUTO: 56 %
NEUTROPHILS NFR BLD AUTO: 59 %
NEUTROPHILS NFR BLD AUTO: 66 %
PLATELET # BLD AUTO: 67 X10E3/UL (ref 150–450)
PLATELET # BLD AUTO: 77 X10E3/UL (ref 150–450)
PLATELET # BLD AUTO: 80 X10E3/UL (ref 150–450)
POTASSIUM SERPL-SCNC: 3.9 MMOL/L (ref 3.5–5.2)
POTASSIUM SERPL-SCNC: 3.9 MMOL/L (ref 3.5–5.2)
POTASSIUM SERPL-SCNC: 4.3 MMOL/L (ref 3.5–5.2)
PROT SERPL-MCNC: 6.4 G/DL (ref 6–8.5)
PROT SERPL-MCNC: 6.9 G/DL (ref 6–8.5)
PROT SERPL-MCNC: 7.1 G/DL (ref 6–8.5)
PROTHROMBIN TIME: 15.9 SEC (ref 9.1–12)
RBC # BLD AUTO: 3.24 X10E6/UL (ref 3.77–5.28)
RBC # BLD AUTO: 3.33 X10E6/UL (ref 3.77–5.28)
RBC # BLD AUTO: 3.64 X10E6/UL (ref 3.77–5.28)
SARS-COV-2 ORF1AB RESP QL NAA+PROBE: DETECTED
SARS-COV-2 ORF1AB RESP QL NAA+PROBE: NOT DETECTED
SARS-COV-2 RNA RESP QL NAA+PROBE: NOT DETECTED
SODIUM SERPL-SCNC: 142 MMOL/L (ref 134–144)
SODIUM SERPL-SCNC: 143 MMOL/L (ref 134–144)
SODIUM SERPL-SCNC: 143 MMOL/L (ref 136–145)
TRIGL SERPL-MCNC: 122 MG/DL (ref 0–150)
TSH SERPL DL<=0.005 MIU/L-ACNC: 5.49 UIU/ML (ref 0.27–4.2)
VLDLC SERPL CALC-MCNC: 22 MG/DL (ref 5–40)
WBC # BLD AUTO: 3.3 X10E3/UL (ref 3.4–10.8)
WBC # BLD AUTO: 3.5 X10E3/UL (ref 3.4–10.8)
WBC # BLD AUTO: 3.9 X10E3/UL (ref 3.4–10.8)

## 2021-01-01 PROCEDURE — 99214 OFFICE O/P EST MOD 30 MIN: CPT | Performed by: INTERNAL MEDICINE

## 2021-01-01 PROCEDURE — 0 GADOBENATE DIMEGLUMINE 529 MG/ML SOLUTION: Performed by: INTERNAL MEDICINE

## 2021-01-01 PROCEDURE — 99214 OFFICE O/P EST MOD 30 MIN: CPT | Performed by: FAMILY MEDICINE

## 2021-01-01 PROCEDURE — C9803 HOPD COVID-19 SPEC COLLECT: HCPCS

## 2021-01-01 PROCEDURE — U0004 COV-19 TEST NON-CDC HGH THRU: HCPCS

## 2021-01-01 PROCEDURE — A9577 INJ MULTIHANCE: HCPCS | Performed by: INTERNAL MEDICINE

## 2021-01-01 PROCEDURE — 82565 ASSAY OF CREATININE: CPT

## 2021-01-01 PROCEDURE — 76705 ECHO EXAM OF ABDOMEN: CPT

## 2021-01-01 PROCEDURE — 83036 HEMOGLOBIN GLYCOSYLATED A1C: CPT | Performed by: FAMILY MEDICINE

## 2021-01-01 PROCEDURE — 74183 MRI ABD W/O CNTR FLWD CNTR: CPT

## 2021-01-01 PROCEDURE — 99214 OFFICE O/P EST MOD 30 MIN: CPT | Performed by: NURSE PRACTITIONER

## 2021-01-01 RX ORDER — METOPROLOL SUCCINATE 50 MG/1
TABLET, EXTENDED RELEASE ORAL
Qty: 30 TABLET | Refills: 0 | Status: SHIPPED | OUTPATIENT
Start: 2021-01-01

## 2021-01-01 RX ORDER — SODIUM CHLORIDE, SODIUM LACTATE, POTASSIUM CHLORIDE, CALCIUM CHLORIDE 600; 310; 30; 20 MG/100ML; MG/100ML; MG/100ML; MG/100ML
30 INJECTION, SOLUTION INTRAVENOUS CONTINUOUS
Status: CANCELLED | OUTPATIENT
Start: 2021-01-01

## 2021-01-01 RX ORDER — OMEPRAZOLE 40 MG/1
CAPSULE, DELAYED RELEASE ORAL
Qty: 30 CAPSULE | Refills: 3 | OUTPATIENT
Start: 2021-01-01

## 2021-01-01 RX ORDER — PANTOPRAZOLE SODIUM 40 MG/1
40 TABLET, DELAYED RELEASE ORAL DAILY
Qty: 90 TABLET | Refills: 3 | Status: SHIPPED | OUTPATIENT
Start: 2021-01-01 | End: 2021-01-01

## 2021-01-01 RX ORDER — LACTULOSE 10 G/15ML
20 SOLUTION ORAL; RECTAL 2 TIMES DAILY
Qty: 946 ML | Refills: 6 | Status: SHIPPED | OUTPATIENT
Start: 2021-01-01

## 2021-01-01 RX ORDER — DOCUSATE SODIUM 100 MG/1
CAPSULE, LIQUID FILLED ORAL
Qty: 30 CAPSULE | Refills: 3 | OUTPATIENT
Start: 2021-01-01

## 2021-01-01 RX ORDER — LORATADINE 10 MG/1
TABLET ORAL
Qty: 90 TABLET | Refills: 4 | Status: SHIPPED | OUTPATIENT
Start: 2021-01-01

## 2021-01-01 RX ORDER — LISINOPRIL 40 MG/1
40 TABLET ORAL DAILY
Qty: 90 TABLET | Refills: 1 | Status: SHIPPED | OUTPATIENT
Start: 2021-01-01 | End: 2021-01-01

## 2021-01-01 RX ORDER — PANTOPRAZOLE SODIUM 40 MG/1
TABLET, DELAYED RELEASE ORAL
Qty: 30 TABLET | Refills: 2 | Status: SHIPPED | OUTPATIENT
Start: 2021-01-01 | End: 2021-01-01

## 2021-01-01 RX ORDER — LACTULOSE 10 G/15ML
20 SOLUTION ORAL; RECTAL 2 TIMES DAILY
Qty: 946 ML | Refills: 6 | Status: SHIPPED | OUTPATIENT
Start: 2021-01-01 | End: 2021-01-01 | Stop reason: SDUPTHER

## 2021-01-01 RX ORDER — ZINC GLUCONATE 50 MG
50 TABLET ORAL 2 TIMES DAILY
COMMUNITY
Start: 2021-01-01

## 2021-01-01 RX ORDER — LANCETS 28 GAUGE
EACH MISCELLANEOUS
Qty: 100 EACH | Refills: 0 | Status: SHIPPED | OUTPATIENT
Start: 2021-01-01

## 2021-01-01 RX ORDER — PANTOPRAZOLE SODIUM 40 MG/1
TABLET, DELAYED RELEASE ORAL
Qty: 90 TABLET | Refills: 3 | Status: SHIPPED | OUTPATIENT
Start: 2021-01-01 | End: 2021-01-01 | Stop reason: SDUPTHER

## 2021-01-01 RX ORDER — BLOOD-GLUCOSE METER
1 KIT MISCELLANEOUS DAILY
Qty: 100 EACH | Refills: 2 | Status: SHIPPED | OUTPATIENT
Start: 2021-01-01

## 2021-01-01 RX ORDER — LISINOPRIL 40 MG/1
TABLET ORAL
Qty: 90 TABLET | Refills: 1 | Status: SHIPPED | OUTPATIENT
Start: 2021-01-01

## 2021-01-01 RX ORDER — LACTULOSE 10 G/15ML
SOLUTION ORAL; RECTAL
Qty: 946 ML | Refills: 4 | Status: SHIPPED | OUTPATIENT
Start: 2021-01-01 | End: 2021-01-01 | Stop reason: SDUPTHER

## 2021-01-01 RX ORDER — FUROSEMIDE 20 MG/1
20 TABLET ORAL DAILY
COMMUNITY
Start: 2021-01-01

## 2021-01-01 RX ORDER — RIFAXIMIN 550 MG/1
TABLET ORAL
Qty: 54 TABLET | Refills: 11 | Status: SHIPPED | OUTPATIENT
Start: 2021-01-01

## 2021-01-01 RX ORDER — BLOOD-GLUCOSE METER
1 KIT MISCELLANEOUS DAILY
Qty: 100 EACH | Refills: 2 | Status: SHIPPED | OUTPATIENT
Start: 2021-01-01 | End: 2021-01-01 | Stop reason: SDUPTHER

## 2021-01-01 RX ORDER — DOCUSATE SODIUM 100 MG/1
CAPSULE, LIQUID FILLED ORAL
Qty: 30 CAPSULE | Refills: 2 | OUTPATIENT
Start: 2021-01-01

## 2021-01-01 RX ORDER — LACTULOSE 10 G/15ML
SOLUTION ORAL; RECTAL
Qty: 473 ML | Refills: 2 | Status: SHIPPED | OUTPATIENT
Start: 2021-01-01 | End: 2021-01-01

## 2021-01-01 RX ORDER — FERROUS SULFATE 325(65) MG
TABLET ORAL
COMMUNITY
Start: 2021-01-01

## 2021-01-01 RX ORDER — LACTULOSE 10 G/15ML
SOLUTION ORAL; RECTAL
Qty: 946 ML | Refills: 4 | Status: SHIPPED | OUTPATIENT
Start: 2021-01-01 | End: 2021-01-01

## 2021-01-01 RX ORDER — LACTULOSE 10 G/15ML
SOLUTION ORAL; RECTAL
Qty: 473 ML | Refills: 2 | OUTPATIENT
Start: 2021-01-01

## 2021-01-01 RX ADMIN — GADOBENATE DIMEGLUMINE 20 ML: 529 INJECTION, SOLUTION INTRAVENOUS at 12:01

## 2021-01-06 DIAGNOSIS — K21.9 GASTROESOPHAGEAL REFLUX DISEASE WITHOUT ESOPHAGITIS: ICD-10-CM

## 2021-01-06 DIAGNOSIS — Z79.899 ENCOUNTER FOR LONG-TERM (CURRENT) USE OF MEDICATIONS: ICD-10-CM

## 2021-01-06 RX ORDER — PANTOPRAZOLE SODIUM 40 MG/1
TABLET, DELAYED RELEASE ORAL
Qty: 30 TABLET | Refills: 0 | Status: SHIPPED | OUTPATIENT
Start: 2021-01-06 | End: 2021-01-01

## 2021-01-19 ENCOUNTER — OFFICE VISIT (OUTPATIENT)
Dept: FAMILY MEDICINE CLINIC | Facility: CLINIC | Age: 56
End: 2021-01-19

## 2021-01-19 VITALS
SYSTOLIC BLOOD PRESSURE: 148 MMHG | DIASTOLIC BLOOD PRESSURE: 58 MMHG | HEIGHT: 66 IN | BODY MASS INDEX: 47.09 KG/M2 | TEMPERATURE: 96.8 F | WEIGHT: 293 LBS | HEART RATE: 98 BPM | OXYGEN SATURATION: 100 %

## 2021-01-19 DIAGNOSIS — F43.23 ADJUSTMENT DISORDER WITH MIXED ANXIETY AND DEPRESSED MOOD: Chronic | ICD-10-CM

## 2021-01-19 DIAGNOSIS — K72.90 LIVER FAILURE WITHOUT HEPATIC COMA, UNSPECIFIED CHRONICITY (HCC): Chronic | ICD-10-CM

## 2021-01-19 DIAGNOSIS — I10 ESSENTIAL HYPERTENSION: Chronic | ICD-10-CM

## 2021-01-19 DIAGNOSIS — Z79.899 ENCOUNTER FOR LONG-TERM (CURRENT) USE OF MEDICATIONS: ICD-10-CM

## 2021-01-19 DIAGNOSIS — E11.9 TYPE 2 DIABETES MELLITUS WITHOUT COMPLICATION, WITHOUT LONG-TERM CURRENT USE OF INSULIN (HCC): Primary | Chronic | ICD-10-CM

## 2021-01-19 DIAGNOSIS — K21.9 GASTROESOPHAGEAL REFLUX DISEASE WITHOUT ESOPHAGITIS: Chronic | ICD-10-CM

## 2021-01-19 DIAGNOSIS — E78.2 MIXED HYPERLIPIDEMIA: Chronic | ICD-10-CM

## 2021-01-19 LAB
HBA1C MFR BLD: 5.1 %
POC CREATININE URINE: 100
POC MICROALBUMIN URINE: 30

## 2021-01-19 PROCEDURE — 83036 HEMOGLOBIN GLYCOSYLATED A1C: CPT | Performed by: FAMILY MEDICINE

## 2021-01-19 PROCEDURE — 82044 UR ALBUMIN SEMIQUANTITATIVE: CPT | Performed by: FAMILY MEDICINE

## 2021-01-19 PROCEDURE — 99214 OFFICE O/P EST MOD 30 MIN: CPT | Performed by: FAMILY MEDICINE

## 2021-01-19 NOTE — PROGRESS NOTES
"Chief Complaint  Diabetes and Hypertension    Subjective          Sameera Stapleton presents to [facility] for   Patient is also here to follow-up on her chronic health conditions:    GERD.  The patient takes pantoprazole 40 mg daily.  She states that helps with her reflux. No side effects of medication.    Hypertension.  The patient is compliant with taking lisinopril 40 mg daily.  She denies any side effects.  She states that her blood pressures have been under good control.    Hyperlipidemia.  The patient is compliant with taking atorvastatin 10 mg daily and denies any side effects.    Type 2 diabetes.  The patient is diet controlled.  She denies any side effects and states that her blood sugars have been under good control.    Depression.  The patient currently takes fluoxetine 20 mg daily.  She feels that is helping with her mood. No SI/HI.  No side effects      Objective   Vital Signs:   /58   Pulse 98   Temp 96.8 °F (36 °C)   Ht 167.6 cm (66\")   Wt 135 kg (296 lb 12.8 oz)   SpO2 100%   BMI 47.90 kg/m²     Physical Exam  Vitals signs and nursing note reviewed.   Constitutional:       Appearance: Normal appearance. She is well-developed. She is obese.   HENT:      Head: Normocephalic and atraumatic.   Eyes:      General: No scleral icterus.     Conjunctiva/sclera: Conjunctivae normal.   Neck:      Musculoskeletal: Normal range of motion and neck supple.   Cardiovascular:      Rate and Rhythm: Normal rate and regular rhythm.      Heart sounds: Normal heart sounds.   Pulmonary:      Effort: Pulmonary effort is normal.      Breath sounds: Normal breath sounds.   Abdominal:      General: Bowel sounds are normal.      Palpations: Abdomen is soft.   Musculoskeletal: Normal range of motion.      Right lower leg: No edema.      Left lower leg: No edema.   Skin:     General: Skin is warm and dry.      Capillary Refill: Capillary refill takes less than 2 seconds.      Findings: No " rash.   Neurological:      General: No focal deficit present.      Mental Status: She is alert and oriented to person, place, and time.   Psychiatric:         Mood and Affect: Mood normal.         Behavior: Behavior normal.         Thought Content: Thought content normal.         Judgment: Judgment normal.        Result Review :   The following data was reviewed by: Maia Roper DO on 01/19/2021:  Common labs    Common Labsle 10/19/20 11/19/20 11/19/20 1/19/21     1130 1130    Glucose  137 (A)     BUN  10     Creatinine  0.90     eGFR Non  Am  65     eGFR African Am  79     Sodium  140     Potassium  4.1     Chloride  106     Calcium  8.5 (A)     Total Protein  6.9     Albumin  2.90 (A)     Total Bilirubin  1.8 (A)     Alkaline Phosphatase  137 (A)     AST (SGOT)  52 (A)     ALT (SGPT)  24     WBC   4.07    Hemoglobin   12.6    Hematocrit   36.1    Platelets   95 (A)    Hemoglobin A1C 4.9   5.1   (A) Abnormal value                          Assessment and Plan    Problem List Items Addressed This Visit        Cardiac and Vasculature    Hypertension    Hyperlipidemia       Endocrine and Metabolic    Type II diabetes mellitus (CMS/HCC) - Primary    Relevant Orders    POC Glycosylated Hemoglobin (Hb A1C) (Completed)    POC Microalbumin (Completed)       Gastrointestinal Abdominal     GERD (gastroesophageal reflux disease)    Liver failure without hepatic coma (CMS/HCC)       Health Encounters    Encounter for long-term (current) use of medications       Mental Health    Adjustment disorder with mixed anxiety and depressed mood        Patient is here today for her chronic stable diabetes, hypertension, GERD and liver failure.  Her next appointment with the hepatologist is in March and she will have a CT prior to that.  Labs per hepatologist.  Refills of medications will be sent in as needed      Follow Up   Return in about 4 months (around 5/19/2021) for HTN, GERD, DM2.  Patient was given instructions and  counseling regarding her condition or for health maintenance advice. Please see specific information pulled into the AVS if appropriate.

## 2021-02-04 NOTE — TELEPHONE ENCOUNTER
Please refused the docusate sodium capsules.  That is a stool softener and the patient should not need a stool softener while she is on lactulose which causes diarrhea.  I would be happy to refill the lactulose

## 2021-02-05 NOTE — TELEPHONE ENCOUNTER
I refused this Rx yesterday also.  I also discontinued this Rx in the chart.  Patient is on Lactulose which causes chronic diarrhea. So she does NOT need this Rx because it is a stool softener.  Thanks!

## 2021-02-05 NOTE — TELEPHONE ENCOUNTER
----- Message from Julain Acekrman Rep sent at 2/4/2021 11:40 AM EST -----  Regarding: HOSPITAL FOLLOW UP  FYI  PATIENT IS SCHEDULE FOR 2/10/21 FOR [facility] FOLLOW UP. INCASE YOU NEED TO DO ANYTHING ON YOUR END.    THANKS!

## 2021-02-12 NOTE — TELEPHONE ENCOUNTER
Called pt back sister Shawna answered told her what albina said (ok per verbal release) sister understood

## 2021-02-12 NOTE — TELEPHONE ENCOUNTER
Patient can take a one-time dose of ibuprofen or naproxen because this is processed through the kidneys.  However, her kidney function on last check was just above normal range and she has history of GERD so she should not take it repeatedly.

## 2021-02-12 NOTE — TELEPHONE ENCOUNTER
Patients boyfriend called and said patient is having a really bad headache but due to her liver failure was told she cannot take OTC meds for pain. Is there something you can suggest she take? (dr holguin patient )

## 2021-02-22 NOTE — TELEPHONE ENCOUNTER
This patient does not need a stool softener when she takes lactulose daily which causes chronic diarrhea.

## 2021-02-23 NOTE — PROGRESS NOTES
Transitional Care Follow Up Visit  Subjective     Sameera Stapleton is a 55 y.o. female who presents for a transitional care management visit.    Within 48 business hours after discharge our office contacted her via telephone to coordinate her care and needs.      I reviewed and discussed the details of that call along with the discharge summary, hospital problems, inpatient lab results, inpatient diagnostic studies, and consultation reports with Sameera.     Current outpatient and discharge medications have been reconciled for the patient.  Reviewed by: Maia Roper DO      Date of TCM Phone Call 5/19/2020   [facility]   Date of Admission 5/14/2020   Date of Discharge 5/18/2020   Discharge Disposition Home or Self Care     Risk for Readmission (LACE) No data recorded    History of Present Illness   Course During Hospital Stay:  DISCHARGE SUMMARY - SCAN - [facility] DISCHARGE 1/30/21 (01/30/2021)  Patient was discharged from [facility] on February 3, 2021.  She was admitted for mental status changes due to acute hypoxic respiratory failure from pneumonia, UTI, sepsis, and acute on chronic liver failure with an elevated ammonia level.  The patient was admitted and treated with ceftriaxone and azithromycin.  The patient's symptoms improved.  Currently the patient is feeling well.  She has finished a course of a azithromycin at home as directed.  The patient does have a scheduled follow-up with her liver doctor on March 17, 2021.    SCANNED - LABS (01/29/2021)      The following portions of the patient's history were reviewed and updated as appropriate: allergies, current medications, past family history, past medical history, past social history, past surgical history and problem list.    Review of Systems   Constitutional: Negative for activity change, appetite change, fatigue and unexpected weight change.   HENT: Negative for congestion, ear pain, nosebleeds, sore throat and tinnitus.    Eyes:  Negative for pain, redness and visual disturbance.   Respiratory: Negative for cough, shortness of breath and wheezing.    Cardiovascular: Negative for chest pain, palpitations and leg swelling.   Gastrointestinal: Negative for abdominal pain, blood in stool and nausea.   Endocrine: Negative for polydipsia and polyuria.   Genitourinary: Negative for dysuria, frequency, menstrual problem and vaginal discharge.   Musculoskeletal: Negative for arthralgias, joint swelling and myalgias.   Skin: Negative for rash.   Allergic/Immunologic: Negative for environmental allergies, food allergies and immunocompromised state.   Neurological: Negative for dizziness, speech difficulty, weakness and headaches.   Hematological: Negative for adenopathy. Does not bruise/bleed easily.   Psychiatric/Behavioral: Negative for decreased concentration and dysphoric mood. The patient is not nervous/anxious.        Objective   Physical Exam  Vitals signs and nursing note reviewed.   Constitutional:       Appearance: Normal appearance. She is well-developed. She is obese.   HENT:      Head: Normocephalic and atraumatic.   Eyes:      General: No scleral icterus.     Conjunctiva/sclera: Conjunctivae normal.   Neck:      Musculoskeletal: Normal range of motion and neck supple.   Cardiovascular:      Rate and Rhythm: Normal rate and regular rhythm.      Heart sounds: Normal heart sounds.   Pulmonary:      Effort: Pulmonary effort is normal.      Breath sounds: Normal breath sounds.   Abdominal:      General: Bowel sounds are normal.      Palpations: Abdomen is soft.      Comments: Obese   Musculoskeletal: Normal range of motion.   Skin:     General: Skin is warm and dry.      Capillary Refill: Capillary refill takes less than 2 seconds.      Findings: No rash.   Neurological:      General: No focal deficit present.      Mental Status: She is alert and oriented to person, place, and time.   Psychiatric:         Mood and Affect: Mood normal.          Behavior: Behavior normal.         Thought Content: Thought content normal.         Judgment: Judgment normal.         Assessment/Plan   Diagnoses and all orders for this visit:    1. Liver failure without hepatic coma, unspecified chronicity (CMS/HCC) (Primary)    2. Type 2 diabetes mellitus without complication, without long-term current use of insulin (CMS/HCC)  -     glucose blood (FREESTYLE LITE) test strip; 1 each by Other route Daily. To check glucose  Dispense: 100 each; Refill: 2    Patient is doing remarkably well given the diagnosis of sepsis in the hospital.  Continue current medications at this time.  Patient's blood sugars at home are between 80 and 150.  She is not on any diabetic medication at this time.  She has a follow-up visit for diabetes in May with me.  She will keep that appointment and will we recheck her A1c at that time.  I advised the patient to keep her follow-up appointment with her liver specialist next month.

## 2021-02-26 NOTE — TELEPHONE ENCOUNTER
Shawna VERGARA IS PATIENTS CARETAKER. Shawna VERGARA HAS BEEN CALLED FOR JURY DUTY. Shawna VERGARA IS REQUESTING A DR NOTE TO SUBMIT TO THE COURT STATING THAT IT IS NOT SAFE FOR HER TO SERVICE ON JURY DUTY BECAUSE OF HER BEING A CAREGIVER DURING COVID. PATIENT HAS LIVER FAILURE, DIBETIES AND JUST GOT OUT OF THE HOSPITAL WITH PNEUMONIA.    PLEASE ADVISE: [phone]

## 2021-02-28 NOTE — TELEPHONE ENCOUNTER
I feel that Shawna Watters can safely attend Jury Duty as long as she is following the recommended guidelines while there.  She should wear a mask anytime she is around the patient (Sameera).  As long as these guidelines are followed then she should pose no additional risk to the patient.

## 2021-03-01 NOTE — TELEPHONE ENCOUNTER
Called Leela at [facility] and she reports that the pt had a us of the ruq on 01/31/2021.  She is asking if pt still needs to have us of liver. Advised will check with Dr Smith and call her back.     Spoke with Dr Smith and she advised as long as report is in chart, pt does not need to have us.   Report is under media tab.     Called Leela back and advised of the above. Advised pt does not have to have us. She verb understanding.   Update sent to Dr Smith.

## 2021-03-01 NOTE — TELEPHONE ENCOUNTER
----- Message from Julian Fay Rep sent at 3/1/2021  8:12 AM EST -----  Regarding: Mary Swenson from [facility] [phone] needing to speak to someone about this pt

## 2021-03-17 PROBLEM — K75.81 LIVER CIRRHOSIS SECONDARY TO NASH: Status: ACTIVE | Noted: 2020-05-15

## 2021-03-17 NOTE — PROGRESS NOTES
Chief Complaint   Patient presents with   • Hepatic Disease       Subjective     HPI    Sameera Stapleton is a 55 y.o. female with a past medical history noted below who presents for follow up Hall cirrhosis complicated by hepatic encephalopathy, small esophageal varices, iron deficiency anemia.    She is here with her mother today.  She was hospitalized late January/early February for pneumonia with sepsis.  She had elevated ammonia and some encephalopathy during this time.  She did have her liver ultrasound updated January 29, scanned in and reviewed, notable for cirrhosis, no masses, small amount of perihepatic ascites.  She was treated with antibiotics--azithromycin and ceftriaxone.  She has since recovered and feels well.    She is taking her lactulose and rifaximin.  Reports 4 bowel movements daily    Says she is staying active    Avoiding sodium    Both patient and mother deny any issues with confusion    2021-01-29 labs from [facility] reviewed.  Bilirubin up to 3.1, INR 1.42, hemoglobin stable, AST 52    Today's visit was in the office.  Both the patient and I were wearing face masks and proper hand hygiene was performed before and after the physical exam.           Current Outpatient Medications:   •  ALBUTEROL SULFATE  (90 Base) MCG/ACT inhaler, INHALE 2 PUFFS BY MOUTH EVERY 4 HOURS AS NEEDED FOR WHEEZING, Disp: 18 g, Rfl: 2  •  Blood Glucose Monitoring Suppl w/Device kit, USE TO TEST BLOOD SUGAR ONCE DAILY. DX CODE E11.65  Indications: Type 2 Diabetes, Disp: 1 each, Rfl: 0  •  FeroSul 325 (65 Fe) MG tablet, TAKE ONE TABLET BY MOUTH ONCE DAILY WITH FOOD FOR ANEMIA. TAKE WITH STOOL SOFTENER AS NEEDED, Disp: 30 tablet, Rfl: 10  •  FLUoxetine (PROzac) 20 MG capsule, Take 1 capsule by mouth Daily., Disp: 90 capsule, Rfl: 0  •  glucose blood (FREESTYLE LITE) test strip, 1 each by Other route Daily. To check glucose, Disp: 100 each, Rfl: 2  •  lactulose (Enulose) 10 GM/15ML solution solution (encephalopathy), TAKE  30 ML BY MOUTH EVERY DAY, Disp: 473 mL, Rfl: 2  •  Lancets misc, USE TO TEST BLOOD SUGAR TWICE DAILY. DX CODE E11.65  Indications: Type 2 Diabetes, Disp: 100 each, Rfl: 0  •  lisinopril (PRINIVIL,ZESTRIL) 40 MG tablet, TAKE 1 TABLET BY MOUTH EVERY DAY, Disp: 90 tablet, Rfl: 1  •  loratadine (CLARITIN) 10 MG tablet, TAKE 1 TABLET BY MOUTH AS NEEDED FOR ALLERGIES, Disp: 90 tablet, Rfl: 2  •  nitrofurantoin, macrocrystal-monohydrate, (Macrobid) 100 MG capsule, Take 1 capsule by mouth 2 (Two) Times a Day., Disp: 10 capsule, Rfl: 0  •  pantoprazole (PROTONIX) 40 MG EC tablet, TAKE 1 TABLET BY MOUTH EVERY DAY, Disp: 30 tablet, Rfl: 2  •  riFAXIMin (Xifaxan) 550 MG tablet, Take 1 tablet by mouth Every 12 (Twelve) Hours., Disp: 60 tablet, Rfl: 11      Objective     Vitals:    03/17/21 1503   BP: 138/75         03/17/21  1503   Weight: 136 kg (300 lb)     Body mass index is 51.49 kg/m².    Physical Exam  Constitutional:       Appearance: She is obese.   Abdominal:      Comments: Obese, nontender             WBC   Date Value Ref Range Status   11/19/2020 4.07 3.40 - 10.80 10*3/mm3 Final     RBC   Date Value Ref Range Status   11/19/2020 3.54 (L) 3.77 - 5.28 10*6/mm3 Final     Hemoglobin   Date Value Ref Range Status   11/19/2020 12.6 12.0 - 15.9 g/dL Final   05/18/2020 7.2 (L) 12.0 - 15.9 g/dL Final     Hematocrit   Date Value Ref Range Status   11/19/2020 36.1 34.0 - 46.6 % Final   05/18/2020 22.8 (L) 34.0 - 46.6 % Final     MCV   Date Value Ref Range Status   11/19/2020 102.0 (H) 79.0 - 97.0 fL Final   05/18/2020 85.1 79.0 - 97.0 fL Final     MCH   Date Value Ref Range Status   11/19/2020 35.6 (H) 26.6 - 33.0 pg Final   05/18/2020 26.9 26.6 - 33.0 pg Final     MCHC   Date Value Ref Range Status   11/19/2020 34.9 31.5 - 35.7 g/dL Final   05/18/2020 31.6 31.5 - 35.7 g/dL Final     RDW   Date Value Ref Range Status   11/19/2020 13.0 12.3 - 15.4 % Final   05/18/2020 14.3 12.3 - 15.4 % Final     RDW-SD   Date Value Ref Range  Status   05/18/2020 43.6 37.0 - 54.0 fl Final     MPV   Date Value Ref Range Status   05/18/2020 11.2 6.0 - 12.0 fL Final     Platelets   Date Value Ref Range Status   11/19/2020 95 (L) 140 - 450 10*3/mm3 Final   05/18/2020 97 (L) 140 - 450 10*3/mm3 Final     Neutrophil Rel %   Date Value Ref Range Status   11/19/2020 69.9 42.7 - 76.0 % Final     Neutrophil %   Date Value Ref Range Status   05/18/2020 36.8 (L) 42.7 - 76.0 % Final     Lymphocyte Rel %   Date Value Ref Range Status   11/19/2020 17.2 (L) 19.6 - 45.3 % Final     Lymphocyte %   Date Value Ref Range Status   05/18/2020 38.4 19.6 - 45.3 % Final     Monocyte Rel %   Date Value Ref Range Status   11/19/2020 9.3 5.0 - 12.0 % Final     Monocyte %   Date Value Ref Range Status   05/18/2020 11.0 5.0 - 12.0 % Final     Eosinophil Rel %   Date Value Ref Range Status   11/19/2020 2.9 0.3 - 6.2 % Final   11/19/2020 4.6 0.3 - 6.2 % Final     Eosinophil %   Date Value Ref Range Status   05/18/2020 12.2 (H) 0.3 - 6.2 % Final     Basophil Rel %   Date Value Ref Range Status   11/19/2020 0.5 0.0 - 1.5 % Final     Basophil %   Date Value Ref Range Status   05/18/2020 1.2 0.0 - 1.5 % Final     Neutrophils Absolute   Date Value Ref Range Status   11/19/2020 2.84 1.70 - 7.00 10*3/mm3 Final   11/19/2020 3.32 1.70 - 7.00 10*3/mm3 Final     Neutrophils, Absolute   Date Value Ref Range Status   05/18/2020 0.94 (L) 1.70 - 7.00 10*3/mm3 Final     Lymphocytes Absolute   Date Value Ref Range Status   11/19/2020 0.70 0.70 - 3.10 10*3/mm3 Final   11/19/2020 0.33 (L) 0.70 - 3.10 10*3/mm3 Final     Lymphocytes, Absolute   Date Value Ref Range Status   05/18/2020 0.98 0.70 - 3.10 10*3/mm3 Final     Monocytes Absolute   Date Value Ref Range Status   11/19/2020 0.38 0.10 - 0.90 10*3/mm3 Final   11/19/2020 0.23 0.10 - 0.90 10*3/mm3 Final     Monocytes, Absolute   Date Value Ref Range Status   05/18/2020 0.28 0.10 - 0.90 10*3/mm3 Final     Eosinophils Absolute   Date Value Ref Range Status    11/19/2020 0.12 0.00 - 0.40 10*3/mm3 Final     Eosinophil Abs   Date Value Ref Range Status   11/19/2020 0.19 0.00 - 0.40 10*3/mm3 Final     Eosinophils, Absolute   Date Value Ref Range Status   05/18/2020 0.31 0.00 - 0.40 10*3/mm3 Final     Basophils Absolute   Date Value Ref Range Status   11/19/2020 0.02 0.00 - 0.20 10*3/mm3 Final     Basophils, Absolute   Date Value Ref Range Status   05/18/2020 0.03 0.00 - 0.20 10*3/mm3 Final     nRBC   Date Value Ref Range Status   08/21/2020 0.0 0.0 - 0.2 /100 WBC Final       Lab Results   Component Value Date    GLUCOSE 96 05/18/2020    BUN 10 11/19/2020    CREATININE 0.90 11/19/2020    EGFRIFNONA 65 11/19/2020    EGFRIFAFRI 79 11/19/2020    BCR 11.1 11/19/2020    CO2 25.9 11/19/2020    CALCIUM 8.5 (L) 11/19/2020    PROTENTOTREF 6.9 11/19/2020    ALBUMIN 2.90 (L) 11/19/2020    LABIL2 0.7 11/19/2020    AST 52 (H) 11/19/2020    ALT 24 11/19/2020         Imaging Results (Last 7 Days)     ** No results found for the last 168 hours. **          I personally reviewed data as detailed below:     The labs listed above as well as the labs from 2021-01-29 and from you eval    The radiology studies as follows: 1/31/21 Liver US from [facility] demonstrating cirrhotic liver, small ascites    Hospital notes from [facility] 1/29/2021    Endoscopy procedures and pathology from:5/2020 EGD      No notes on file    Assessment/Plan    1.  BELTR[place]N cirrhosis: Decompensated with [place] encephalopathy, small varices.  Meld from January labs 15    2.  Hepatic encephalopathy: Currently stable with lactulose, Xifaxan    3.  Secondary esophageal varices    4.  Morbid obesity: BMI over 50    Plan  Continue lactulose, Xifaxan  We will plan for repeating her ultrasound in July  We will plan for repeating her EGD may or thereafter for variceal surveillance  We will update labs in 3 to 4 months; she sees her PCP in May    Diagnoses and all orders for this visit:    1. Liver cirrhosis  secondary to BELTR[place]N (CMS/HCC) (Primary)  -      Liver; Future  -     Case Request; Standing  -     Follow Anesthesia Guidelines / Standing Orders; Future  -     Obtain Informed Consent; Future  -     Implement Anesthesia Orders Day of Procedure; Standing  -     Obtain Informed Consent; Standing  -     lactated ringers infusion  -     Case Request    2. Hepatic encephalopathy (CMS/HCC)    3. Secondary esophageal varices without bleeding (CMS/HCC)  -     Case Request; Standing  -     Follow Anesthesia Guidelines / Standing Orders; Future  -     Obtain Informed Consent; Future  -     Implement Anesthesia Orders Day of Procedure; Standing  -     Obtain Informed Consent; Standing  -     lactated ringers infusion  -     Case Request    4. Morbid obesity (CMS/HCC)        I have discussed the above plan with the patient.  They verbalize understanding and are in agreement with the plan.  They have been advised to contact the office for any questions, concerns, or changes related to their health.    Dictated utilizing Dragon dictation

## 2021-04-05 NOTE — TELEPHONE ENCOUNTER
I don't know how to find the Accu-Chek glucometer, test strips and lancets kit in the system but if you call [place] [facility] and give them a verbal order they can put it through that way. Thank you

## 2021-04-20 NOTE — TELEPHONE ENCOUNTER
PT SCHEDULED FOR H/S F/U FOR CONFUSION. WAS DISCHARGED YESTERDAY FROM [facility].    SHE ALSO NEEDS A REFERRAL TO BE PLACED FOR DR [facility]. SHE ALREADY HAS AN APPT SCHEDULED WITH THEM ON 04/23/2021. IT IS TO HAVE FEET CLIPPED.    FAX [phone]

## 2021-04-26 NOTE — PROGRESS NOTES
"Chief Complaint  Hospital Follow Up Visit    Subjective          Sameera Stapleton presents to [facility]  Patient is here today to follow-up from her recent hospital stay last week.  She went to the ER on Monday because she felt shaky and was a little confused.  They had kept her overnight for cirrhosis of the liver with encephalopathy.  They made no medication changes other than to increase the lactulose to twice daily.  Unfortunately, she had an upper endoscopy scheduled for last Tuesday which she had to miss because she was in the hospital.  She has not rescheduled the endoscopy yet but plans to.  She has follow-up planned with her liver specialist in the near future.  Patient is present today with her sister, Shawna, who supplies some of this information.  The patient states that her blood sugars have been good and she was told that they were good while she was in the hospital.  Patient denies any black or bloody stools.  She denies any bleeding in general.      Objective   Vital Signs:   /70   Pulse 97   Temp 96 °F (35.6 °C)   Ht 162.6 cm (64.02\")   Wt (!) 138 kg (305 lb 3.2 oz)   SpO2 99%   BMI 52.36 kg/m²     Physical Exam  Vitals and nursing note reviewed.   Constitutional:       Appearance: Normal appearance. She is well-developed. She is obese.   HENT:      Head: Normocephalic and atraumatic.   Eyes:      General: No scleral icterus.     Conjunctiva/sclera: Conjunctivae normal.   Cardiovascular:      Rate and Rhythm: Normal rate and regular rhythm.      Heart sounds: Normal heart sounds.   Pulmonary:      Effort: Pulmonary effort is normal.      Breath sounds: Normal breath sounds.   Abdominal:      General: Bowel sounds are normal.      Palpations: Abdomen is soft.   Musculoskeletal:         General: Normal range of motion.      Cervical back: Normal range of motion and neck supple.      Right lower leg: No edema.      Left lower leg: No edema.   Skin:     General: Skin " is warm and dry.      Capillary Refill: Capillary refill takes less than 2 seconds.      Findings: No rash.   Neurological:      Mental Status: She is alert and oriented to person, place, and time.   Psychiatric:         Mood and Affect: Mood normal.         Behavior: Behavior normal.         Thought Content: Thought content normal.         Judgment: Judgment normal.        Result Review :   The following data was reviewed by: Maia Roper DO on 04/26/2021:  Common labs    Common Labsle 10/19/20 11/19/20 11/19/20 1/19/21     1130 1130    Glucose  137 (A)     BUN  10     Creatinine  0.90     eGFR Non  Am  65     eGFR African Am  79     Sodium  140     Potassium  4.1     Chloride  106     Calcium  8.5 (A)     Total Protein  6.9     Albumin  2.90 (A)     Total Bilirubin  1.8 (A)     Alkaline Phosphatase  137 (A)     AST (SGOT)  52 (A)     ALT (SGPT)  24     WBC   4.07    Hemoglobin   12.6    Hematocrit   36.1    Platelets   95 (A)    Hemoglobin A1C 4.9   5.1   (A) Abnormal value                         DISCHARGE SUMMARY - SCAN - [facility] DISCHARGE 4/19/21 (04/19/2021)    Assessment and Plan    Diagnoses and all orders for this visit:    1. Liver cirrhosis secondary to BELTRÁN (CMS/HCC) (Primary)  -     CBC & Differential    2. Hepatic encephalopathy (CMS/HCC)  -     CBC & Differential    3. Thrombocytopenia (CMS/HCC)  -     CBC & Differential    Patient is doing much better today.  On the labs from last week her platelet count was down to 54,000.  Patient's baseline platelet count is between 80 and 110.  We will repeat CBC for platelet count today.  Surveillance labs were obtained today and any medication changes will be made based on lab results and will be called to the patient later this week.    Follow Up   Return if symptoms worsen or fail to improve.  Patient was given instructions and counseling regarding her condition or for health maintenance advice. Please see specific information pulled into the AVS if  appropriate.

## 2021-04-28 NOTE — TELEPHONE ENCOUNTER
Caller: URSULA JUNE    Relationship: Emergency Contact    Best call back number: [phone]    Caller requesting test results: SISTER    What test was performed: BLOODWORK RESULTS    When was the test performed: MONDAY    Where was the test performed: IN OFFICE    Additional notes: PLEASE CALL SISTER WITH ANY MESSAGES.

## 2021-05-03 NOTE — TELEPHONE ENCOUNTER
Call to June.  Advise per DR Smith note.  Verb understanding.      States will have labs done at PCP office 5/24.     Message to Yolanda CARRANZA

## 2021-05-24 NOTE — PROGRESS NOTES
"Chief Complaint  Hypertension and Diabetes    Subjective          Sameera Stapleton presents to [facility]  Patient is also here to follow-up on her chronic health conditions:    Cirrhosis due to Hall.  The patient is followed by gastroenterology, Dr Bibiana Smith.  She is scheduled for endoscopy and colonoscopy but unfortunately due to the thrombocytopenia she has been waiting to see what the results of her platelets are today.    GERD.  The patient takes pantoprazole 40 mg daily.  She states that helps with her reflux. No side effects of medication.    Hypertension.  The patient is compliant with taking lisinopril 40 mg daily.  She denies any side effects.  She states that her blood pressures have been under good control.    Hyperlipidemia.  The patient is compliant with taking atorvastatin 10 mg daily and denies any side effects.    Type 2 diabetes.  The patient is diet controlled.  She states that her blood sugars have been under good control.          Objective   Vital Signs:   /70   Pulse 103   Temp 98.7 °F (37.1 °C)   Ht 162.6 cm (64.02\")   Wt (!) 138 kg (304 lb 9.6 oz)   SpO2 99%   BMI 52.25 kg/m²     Physical Exam  Vitals and nursing note reviewed.   Constitutional:       Appearance: Normal appearance. She is well-developed. She is obese.   HENT:      Head: Normocephalic and atraumatic.   Eyes:      Conjunctiva/sclera: Conjunctivae normal.   Cardiovascular:      Rate and Rhythm: Normal rate and regular rhythm.      Heart sounds: Normal heart sounds.   Pulmonary:      Effort: Pulmonary effort is normal.      Breath sounds: Normal breath sounds.   Abdominal:      General: Bowel sounds are normal.      Palpations: Abdomen is soft.   Musculoskeletal:         General: Normal range of motion.      Cervical back: Normal range of motion and neck supple.   Skin:     General: Skin is warm and dry.      Capillary Refill: Capillary refill takes less than 2 seconds.      Findings: No " rash.   Neurological:      General: No focal deficit present.      Mental Status: She is alert and oriented to person, place, and time.   Psychiatric:         Mood and Affect: Mood normal.         Behavior: Behavior normal.         Thought Content: Thought content normal.         Judgment: Judgment normal.        Result Review :   The following data was reviewed by: Maia Roper DO on 05/24/2021:  Common labs    Common Labsle 1/19/21 4/26/21 5/24/21   WBC  3.3 (A)    Hemoglobin  12.2    Hematocrit  34.5    Platelets  67 (A)    Hemoglobin A1C 5.1  4.8   (A) Abnormal value       Comments are available for some flowsheets but are not being displayed.               Most Recent A1C    HGBA1C Most Recent 5/24/21   Hemoglobin A1C 4.8                     Assessment and Plan    Diagnoses and all orders for this visit:    1. Type 2 diabetes mellitus without complication, without long-term current use of insulin (CMS/HCC) (Primary)  -     POC Glycosylated Hemoglobin (Hb A1C)  -     Comprehensive Metabolic Panel  -     glucose blood (FREESTYLE LITE) test strip; 1 each by Other route Daily. To check glucose  Dispense: 100 each; Refill: 2    2. Essential hypertension  -     Comprehensive Metabolic Panel  -     lisinopril (PRINIVIL,ZESTRIL) 40 MG tablet; Take 1 tablet by mouth Daily.  Dispense: 90 tablet; Refill: 1    3. Mixed hyperlipidemia    4. Liver cirrhosis secondary to BELTRÁN (CMS/HCC)  -     Comprehensive Metabolic Panel    5. Thrombocytopenia (CMS/HCC)  -     CBC & Differential    6. Encounter for long-term (current) use of medications  -     CBC & Differential  -     Comprehensive Metabolic Panel    Patient is here today for chronic stable type 2 diabetes, hypertension, hyperlipidemia, and cirrhosis with recent thrombocytopenia.  Surveillance labs were obtained today and any medication changes will be made based on lab results and will be called to the patient later this week.      Follow Up   Return in about 3 months  (around 8/24/2021) for Diabetes, HTN.  Patient was given instructions and counseling regarding her condition or for health maintenance advice. Please see specific information pulled into the AVS if appropriate.

## 2021-07-21 NOTE — PROGRESS NOTES
Chief Complaint   Patient presents with   • Cirrhosis       Sameera Stapleton is a  56 y.o. female here for a follow up visit for cirrhosis.    HPI  56-year-old female presents today accompanied by her mother for follow-up visit for cirrhosis.  She is a patient of Dr. Smith.  She was last seen in the office on 3/17/2021.  She has a history of Hall cirrhosis with hepatic encephalopathy and admits lately she has been doing really well.  She takes lactulose and reports 3 loose stools a day.  She is also on Xifaxan twice daily.  She underwent EGD on 6/21 that showed some small esophageal varices.  Last colonoscopy was on 5/16/2020.  She is happy to report that she is really worked hard to change her diet and she is really trying to avoid salt.  She denies any ascites or dependent pedal edema.  Her most recent liver ultrasound showed cirrhosis but was otherwise stable.  She reports a good appetite.  She denies any dysphagia, reflux, abdominal pain, nausea and vomiting, constipation, rectal bleeding or melena.  She admits her appetite is good and her weight is stable.  She denies any signs of jaundice or confusion.  He does have a history of GERD admits she does well on Protonix 40 mg daily.  She denies any breakthrough reflux at this time.  She also has a history of type 2 diabetes.  Past Medical History:   Diagnosis Date   • Adjustment disorder with mixed anxiety and depressed mood    • Allergies    • Anemia    • Elevated LFTs    • Encounter for long-term (current) use of medications    • GERD (gastroesophageal reflux disease)    • Heartburn    • Hyperlipidemia     NO MEDS   • Hypertension    • Leukopenia    • Overweight    • Pneumonia    • Poor historian    • Type II diabetes mellitus (CMS/HCC)     NO MEDS   • Urinary tract infection        Past Surgical History:   Procedure Laterality Date   • COLONOSCOPY N/A 5/16/2020    Procedure: COLONOSCOPY TO ASCENDING COLON;  Surgeon: Aaron Pandya MD;  Location: [facility] ENDOSCOPY;   Service: Gastroenterology;  Laterality: N/A;  ANEMIA  --ABORTED AT ASCENDING COLON, INTERNAL HEMORRHOIDS    • ENDOSCOPY N/A 5/16/2020    Procedure: ESOPHAGOGASTRODUODENOSCOPY;  Surgeon: Aaron Pandya MD;  Location: [facility] ENDOSCOPY;  Service: Gastroenterology;  Laterality: N/A;  ANEMIA, BELTRÁN, CIRRHOSIS   --GRADE 1 VARICES        Scheduled Meds:    Continuous Infusions:No current facility-administered medications for this visit.      PRN Meds:.    Allergies   Allergen Reactions   • Acetaminophen Other (See Comments)     cannot take r/t liver disease       Social History     Socioeconomic History   • Marital status: Single     Spouse name: Not on file   • Number of children: Not on file   • Years of education: Not on file   • Highest education level: Not on file   Tobacco Use   • Smoking status: Never Smoker   • Smokeless tobacco: Never Used   Vaping Use   • Vaping Use: Never used   Substance and Sexual Activity   • Alcohol use: No   • Drug use: Never   • Sexual activity: Defer       Family History   Problem Relation Age of Onset   • Diabetes Other    • Hypertension Other        Review of Systems   Constitutional: Negative for appetite change, chills, diaphoresis, fatigue, fever and unexpected weight change.   HENT: Negative for nosebleeds, postnasal drip, sore throat, trouble swallowing and voice change.    Respiratory: Negative for cough, choking, chest tightness, shortness of breath and wheezing.    Cardiovascular: Negative for chest pain, palpitations and leg swelling.   Gastrointestinal: Positive for abdominal distention. Negative for abdominal pain, anal bleeding, blood in stool, constipation, diarrhea, nausea, rectal pain and vomiting.   Endocrine: Negative for polydipsia, polyphagia and polyuria.   Musculoskeletal: Negative for gait problem.   Skin: Negative for rash and wound.   Allergic/Immunologic: Negative for food allergies.   Neurological: Negative for dizziness, speech difficulty and  light-headedness.   Psychiatric/Behavioral: Negative for confusion, self-injury, sleep disturbance and suicidal ideas.       There were no vitals filed for this visit.    Physical Exam  Constitutional:       General: She is not in acute distress.     Appearance: She is well-developed. She is not ill-appearing.   HENT:      Head: Normocephalic.   Eyes:      Pupils: Pupils are equal, round, and reactive to light.   Cardiovascular:      Rate and Rhythm: Normal rate and regular rhythm.      Heart sounds: Normal heart sounds.   Pulmonary:      Effort: Pulmonary effort is normal.      Breath sounds: Normal breath sounds.   Abdominal:      General: Bowel sounds are normal. There is no distension.      Palpations: Abdomen is soft. There is no mass.      Tenderness: There is no abdominal tenderness. There is no guarding or rebound.      Hernia: No hernia is present.   Musculoskeletal:         General: Normal range of motion.   Skin:     General: Skin is warm and dry.   Neurological:      Mental Status: She is alert and oriented to person, place, and time.   Psychiatric:         Speech: Speech normal.         Behavior: Behavior normal.         Judgment: Judgment normal.         No radiology results for the last 7 days     Assessment and plan     1. Liver cirrhosis secondary to BELTRÁN (CMS/HCC)  - CBC & Differential  - Comprehensive Metabolic Panel  - Protime-INR  - Ammonia  - Magnesium  - AFP Tumor Marker    2. Hepatic encephalopathy (CMS/HCC)  - CBC & Differential  - Comprehensive Metabolic Panel  - Protime-INR  - Ammonia  - Magnesium  - AFP Tumor Marker    3. Secondary esophageal varices without bleeding (CMS/HCC)    4. Gastroesophageal reflux disease without esophagitis    Reviewed most recent labs and liver imaging results with her today.  Overall she seems to be doing very well.  Continue lactulose and Xifaxan as planned.  Continue 2 g sodium diet.  GERD seems well controlled on Protonix 40 mg daily.  I did review her EGD  results with her today.  I will go ahead and check some routine labs with her today.  No signs of jaundice, ascites or dependent pedal edema today.  Patient to call the office with any issues.  Patient to follow-up with Dr. Smith as planned.

## 2021-07-28 NOTE — TELEPHONE ENCOUNTER
----- Message from Bibiana Smith MD sent at 7/9/2021  4:37 PM EDT -----  Liver ultrasound again shows a cirrhotic appearing liver, no mass identified.

## 2021-08-25 NOTE — PROGRESS NOTES
"Chief Complaint  Hypertension and Diabetes    Subjective          Sameera Stapleton presents to [facility]  Patient is here to follow-up on her chronic health conditions:    Cirrhosis due to Hall.  The patient is followed by gastroenterology, Dr Bibiana Smith.  She is going to re-schedule the endoscopy and colonoscopy when the platelets are better.    GERD.  The patient takes pantoprazole 40 mg daily.  She states that helps with her reflux. No side effects of medication.    Hypertension.  The patient is compliant with taking lisinopril 40 mg daily.  She denies any side effects.  She states that her blood pressures have been under good control.    Hyperlipidemia.  The patient is compliant with taking atorvastatin 10 mg daily and denies any side effects.  Cholesterol is stable    Type 2 diabetes.  The patient is diet controlled.  She states that her blood sugars have been under good control.      Objective   Vital Signs:   /58   Pulse 98   Temp 97.8 °F (36.6 °C)   Ht 167.6 cm (66\")   Wt 135 kg (297 lb 12.8 oz)   SpO2 100%   BMI 48.07 kg/m²     Physical Exam  Vitals and nursing note reviewed.   Constitutional:       Appearance: Normal appearance. She is well-developed. She is obese.   HENT:      Head: Normocephalic and atraumatic.   Eyes:      Conjunctiva/sclera: Conjunctivae normal.   Cardiovascular:      Rate and Rhythm: Normal rate and regular rhythm.      Heart sounds: Normal heart sounds.   Pulmonary:      Effort: Pulmonary effort is normal.      Breath sounds: Normal breath sounds.   Musculoskeletal:         General: Normal range of motion.      Cervical back: Normal range of motion and neck supple.   Skin:     General: Skin is warm and dry.      Capillary Refill: Capillary refill takes less than 2 seconds.      Findings: No rash.   Neurological:      Mental Status: She is alert and oriented to person, place, and time.   Psychiatric:         Mood and Affect: Mood normal.        "  Behavior: Behavior normal.         Thought Content: Thought content normal.         Judgment: Judgment normal.        Result Review :   The following data was reviewed by: Maia Roper DO on 08/25/2021:  Common labs    Common Labsle 4/26/21 5/24/21 5/24/21 5/24/21 7/21/21 7/21/21     0000 0000 1343 1134 1134   Glucose   138 (A)  108 (A)    BUN   10  8    Creatinine   0.94  0.87    eGFR Non  Am   69  75    eGFR African Am   80  87    Sodium   143  142    Potassium   3.9  4.3    Chloride   110 (A)  110 (A)    Calcium   8.6 (A)  8.9    Total Protein   6.4  6.9    Albumin   2.3 (A)  2.8 (A)    Total Bilirubin   3.9 (A)  3.6 (A)    Alkaline Phosphatase   149 (A)  168 (A)    AST (SGOT)   95 (A)  76 (A)    ALT (SGPT)   48 (A)  38 (A)    WBC 3.3 (A) 3.5    3.9   Hemoglobin 12.2 13.2    12.0   Hematocrit 34.5 37.4    33.3 (A)   Platelets 67 (A) 80 (A)    77 (A)   Hemoglobin A1C    4.8     (A) Abnormal value       Comments are available for some flowsheets but are not being displayed.                         Assessment and Plan    Diagnoses and all orders for this visit:    1. Type 2 diabetes mellitus without complication, without long-term current use of insulin (CMS/HCC) (Primary)  -     Hemoglobin A1c  -     Comprehensive Metabolic Panel    2. Essential hypertension  -     Comprehensive Metabolic Panel    3. Mixed hyperlipidemia  -     Lipid Panel    4. Liver cirrhosis secondary to BELTRÁN (CMS/HCC)    5. Adjustment disorder with mixed anxiety and depressed mood    6. Gastroesophageal reflux disease without esophagitis    7. Encounter for long-term (current) use of medications  -     Hemoglobin A1c  -     Lipid Panel  -     TSH  -     Comprehensive Metabolic Panel      Patient is here to follow-up on chronic stable type 2 diabetes, hypertension, hyperlipidemia, cirrhosis, adjustment disorder, and GERD. Surveillance labs were obtained today and any medication changes will be made based on lab results and will be  called to the patient later this week.        Follow Up   Return for Cirrhosis, HTN, Diabetes.  Patient was given instructions and counseling regarding her condition or for health maintenance advice. Please see specific information pulled into the AVS if appropriate.

## 2021-08-31 PROBLEM — F79 INTELLECTUAL DISABILITY: Status: ACTIVE | Noted: 2021-01-01

## 2021-08-31 PROBLEM — R18.8 OTHER ASCITES: Status: ACTIVE | Noted: 2021-01-01

## 2021-08-31 PROBLEM — D69.6 THROMBOCYTOPENIA (HCC): Chronic | Status: ACTIVE | Noted: 2021-01-01

## 2021-08-31 PROBLEM — K86.9 PANCREATIC LESION: Status: ACTIVE | Noted: 2021-01-01

## 2021-08-31 NOTE — PROGRESS NOTES
Chief Complaint   Patient presents with   • Cirrhosis-BELTRÁN   • Hospital Follow Up Visit       Subjective     HPI    Sameera Stapleton is a 56 y.o. 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    Sister with her today and she helps provide her history given the patient's intellectual disability. She was hospitalized at [facility] in mid August (8/11 through 8/17) and seen by the hepatology service. She had chills at home and was treated for PSC and BELTRÁN cirrhosis.  She has been started on low-dose diuretics.  She did have a paracentesis but it appears that this was diagnostic and not therapeutic.  Apparently her abdomen was more firm and hard but she is not having the complaint at the present time.  Incidentally, pancreatic lesion was identified at the tail.    She lives with her boyfriend at home.  Her sister voices concerns because her boyfriend has just had back surgery and is quite dependent on her.  He had even refused rehab so she is having to provide a lot of his care.    Does report adherence to her medications.  She reports 3-4 bowel movements daily.  She is taking her iron.  She just had labs drawn with her PCP August 25.    She is now on zinc.    She denies any issues with encephalopathy at the present time.    She was due to have her repeat EGD for esophageal variceal surveillance in June but canceled that appointment.      Today's visit was in the office.  Both the patient and I were wearing face masks and proper hand hygiene was performed before and after the physical exam.           Current Outpatient Medications:   •  ALBUTEROL SULFATE  (90 Base) MCG/ACT inhaler, INHALE 2 PUFFS BY MOUTH EVERY 4 HOURS AS NEEDED FOR WHEEZING, Disp: 18 g, Rfl: 2  •  Blood Glucose Monitoring Suppl w/Device kit, USE TO TEST BLOOD SUGAR ONCE DAILY. DX CODE  E11.65  Indications: Type 2 Diabetes, Disp: 1 each, Rfl: 0  •  FeroSul 325 (65 Fe) MG tablet, TAKE 1 TABLET BY MOUTH EVERY DAY WITH FOOD FOR ANEMIA. TAKE WITH STOOL SOFTENER AS NEEDED., Disp: , Rfl:   •  furosemide (LASIX) 20 MG tablet, 20 mg., Disp: , Rfl:   •  glucose blood (FREESTYLE LITE) test strip, 1 each by Other route Daily. To check glucose, Disp: 100 each, Rfl: 2  •  lactulose (Enulose) 10 GM/15ML solution solution (encephalopathy), Take 30 mL by mouth 2 (Two) Times a Day., Disp: 946 mL, Rfl: 6  •  Lancets (freestyle) lancets, USE AS DIRECTED TO TEST BLOOD SUGAR TWO TIMES A DAY, Disp: 100 each, Rfl: 0  •  lisinopril (PRINIVIL,ZESTRIL) 40 MG tablet, Take 1 tablet by mouth Daily., Disp: 90 tablet, Rfl: 1  •  loratadine (CLARITIN) 10 MG tablet, TAKE 1 TABLET BY MOUTH EVERY DAY AS NEEDED FOR ALLERGIES, Disp: 90 tablet, Rfl: 4  •  pantoprazole (PROTONIX) 40 MG EC tablet, TAKE 1 TABLET BY MOUTH EVERY DAY, Disp: 90 tablet, Rfl: 3  •  Xifaxan 550 MG tablet, TAKE 1 TABLET BY MOUTH EVERY 12 HOURS, Disp: 54 tablet, Rfl: 11  •  zinc gluconate 50 MG tablet, Take 50 mg by mouth 2 (Two) Times a Day., Disp: , Rfl:       Objective     Vitals:    08/31/21 1341   BP: 124/70   Temp: 96.6 °F (35.9 °C)         08/31/21  1341   Weight: 129 kg (284 lb 11.2 oz)     Body mass index is 45.95 kg/m².    Physical Exam  Constitutional:       General: She is not in acute distress.     Appearance: She is obese.   Pulmonary:      Effort: Pulmonary effort is normal.   Abdominal:      Palpations: Abdomen is soft.      Tenderness: There is no abdominal tenderness.   Neurological:      Mental Status: She is alert and oriented to person, place, and time.   Psychiatric:      Comments: She is alert and oriented, she is pleasant, she is a poor historian, does have intellectual disability             WBC   Date Value Ref Range Status   07/21/2021 3.9 3.4 - 10.8 x10E3/uL Final     RBC   Date Value Ref Range Status   07/21/2021 3.24 (L) 3.77 - 5.28  x10E6/uL Final     Hemoglobin   Date Value Ref Range Status   07/21/2021 12.0 11.1 - 15.9 g/dL Final     Comment:     **Verified by repeat analysis**   05/18/2020 7.2 (L) 12.0 - 15.9 g/dL Final     Hematocrit   Date Value Ref Range Status   07/21/2021 33.3 (L) 34.0 - 46.6 % Final   05/18/2020 22.8 (L) 34.0 - 46.6 % Final     MCV   Date Value Ref Range Status   07/21/2021 103 (H) 79 - 97 fL Final   05/18/2020 85.1 79.0 - 97.0 fL Final     MCH   Date Value Ref Range Status   07/21/2021 37.0 (H) 26.6 - 33.0 pg Final   05/18/2020 26.9 26.6 - 33.0 pg Final     MCHC   Date Value Ref Range Status   07/21/2021 36.0 (H) 31.5 - 35.7 g/dL Final   05/18/2020 31.6 31.5 - 35.7 g/dL Final     RDW   Date Value Ref Range Status   07/21/2021 13.1 11.7 - 15.4 % Final   05/18/2020 14.3 12.3 - 15.4 % Final     RDW-SD   Date Value Ref Range Status   05/18/2020 43.6 37.0 - 54.0 fl Final     MPV   Date Value Ref Range Status   05/18/2020 11.2 6.0 - 12.0 fL Final     Platelets   Date Value Ref Range Status   07/21/2021 77 (L) 150 - 450 x10E3/uL Final     Comment:     Platelet count verified by examination of peripheral blood smear.   05/18/2020 97 (L) 140 - 450 10*3/mm3 Final     Neutrophil Rel %   Date Value Ref Range Status   07/21/2021 66 Not Estab. % Final     Neutrophil %   Date Value Ref Range Status   05/18/2020 36.8 (L) 42.7 - 76.0 % Final     Lymphocyte Rel %   Date Value Ref Range Status   07/21/2021 18 Not Estab. % Final     Lymphocyte %   Date Value Ref Range Status   05/18/2020 38.4 19.6 - 45.3 % Final     Monocyte Rel %   Date Value Ref Range Status   07/21/2021 10 Not Estab. % Final     Monocyte %   Date Value Ref Range Status   05/18/2020 11.0 5.0 - 12.0 % Final     Eosinophil Rel %   Date Value Ref Range Status   07/21/2021 5 Not Estab. % Final     Eosinophil %   Date Value Ref Range Status   05/18/2020 12.2 (H) 0.3 - 6.2 % Final     Basophil Rel %   Date Value Ref Range Status   07/21/2021 1 Not Estab. % Final     Basophil  %   Date Value Ref Range Status   05/18/2020 1.2 0.0 - 1.5 % Final     Neutrophils Absolute   Date Value Ref Range Status   07/21/2021 2.6 1.4 - 7.0 x10E3/uL Final     Neutrophils, Absolute   Date Value Ref Range Status   05/18/2020 0.94 (L) 1.70 - 7.00 10*3/mm3 Final     Lymphocytes Absolute   Date Value Ref Range Status   07/21/2021 0.7 0.7 - 3.1 x10E3/uL Final     Lymphocytes, Absolute   Date Value Ref Range Status   05/18/2020 0.98 0.70 - 3.10 10*3/mm3 Final     Monocytes Absolute   Date Value Ref Range Status   07/21/2021 0.4 0.1 - 0.9 x10E3/uL Final     Monocytes, Absolute   Date Value Ref Range Status   05/18/2020 0.28 0.10 - 0.90 10*3/mm3 Final     Eosinophils Absolute   Date Value Ref Range Status   07/21/2021 0.2 0.0 - 0.4 x10E3/uL Final     Eosinophils, Absolute   Date Value Ref Range Status   05/18/2020 0.31 0.00 - 0.40 10*3/mm3 Final     Basophils Absolute   Date Value Ref Range Status   07/21/2021 0.0 0.0 - 0.2 x10E3/uL Final     Basophils, Absolute   Date Value Ref Range Status   05/18/2020 0.03 0.00 - 0.20 10*3/mm3 Final     nRBC   Date Value Ref Range Status   08/21/2020 0.0 0.0 - 0.2 /100 WBC Final       Lab Results   Component Value Date    GLUCOSE 96 05/18/2020    BUN 13 08/25/2021    CREATININE 0.96 08/25/2021    EGFRIFNONA 60 (L) 08/25/2021    EGFRIFAFRI 73 08/25/2021    BCR 13.5 08/25/2021    CO2 26.5 08/25/2021    CALCIUM 8.7 08/25/2021    PROTENTOTREF 7.1 08/25/2021    ALBUMIN 2.80 (L) 08/25/2021    LABIL2 0.7 08/25/2021    AST 82 (H) 08/25/2021    ALT 42 (H) 08/25/2021         US LIVER-     Clinical: Hall cirrhosis     Technically difficult evaluation due to obesity     COMPARISON 9/21/2020     Ultrasound findings: Pancreas was obscured due to gas within the  overlying stomach and cannot be evaluated. No gallstones demonstrated.  No biliary duct dilatation CBD is 6 mm.     The liver appears small in size for the patient's given body habitus  with superficial irregularity and some overall  coarsening of the hepatic  echotexture. The findings are consistent with cirrhosis. There was free  intraperitoneal fluid within the upper abdomen.     The right kidney measures 10.9 cm in length and is normal in appearance.  The remainder is unremarkable.     This report was finalized on 7/7/2021 11:09 AM by Dr. Ranjit Hernandez M.D.    I personally reviewed data as detailed below:     The labs listed above.    The radiology studies above and as follows 8/11/2021 CT of the abdomen pelvis showing cirrhosis, no lesions of the liver, ascites, nonobstructing right nephrolithiasis, diverticulosis without diverticulitis, nonspecific 9 mm pancreatic tail lesion for which MRI pancreatic mass protocol is recommended    Office notes from: 8/25/21 pcp note; [facility] notes from August 11 through August 17        No notes on file    Assessment/Plan    1.  Liver cirrhosis secondary to BELTRÁN: Decompensated with ascites, varices, hepatic encephalopathy    2.  Esophageal varices: Small on her EGD 1 year ago.  She is due for repeat, canceled earlier this year    3.  Hepatic encephalopathy: Waxes and wanes.  She reports adherence to lactulose and Xifaxan    4.  Ascites: New finding on CT imaging last month.  She is now on low-dose diuretics    5.  Pancreatic lesion: Incidental finding    6.  Thrombocytopenia and coagulopathy: Stable    7.  Intellectual disability: This is complicating all aspects of her care    8.  Morbid obesity: Her weight is down to 284 which is good.  Previously it was over 300    Plan  Discussed with the patient and her sister need for the EGD for variceal surveillance.  The patient has fears about this but I explained why it is needed  MRI to further assess the pancreatic lesion  Continue lactulose, Xifaxan  Discussed end-stage nature of her disease.  She was seen by Dr. Schmid at [facility] during the hospitalization.  She expresses fears over discussion of liver transplantation; also given her morbid obesity  she is not a candidate for transplant  Discussed that she needs ongoing close monitoring      Diagnoses and all orders for this visit:    1. Liver cirrhosis secondary to BELTRÁN (CMS/HCC) (Primary)  -     Case Request; Standing  -     Follow Anesthesia Guidelines / Standing Orders; Future  -     Obtain Informed Consent; Future  -     Implement Anesthesia Orders Day of Procedure; Standing  -     Obtain Informed Consent; Standing  -     lactated ringers infusion  -     Case Request    2. Secondary esophageal varices without bleeding (CMS/HCC)  -     Case Request; Standing  -     Follow Anesthesia Guidelines / Standing Orders; Future  -     Obtain Informed Consent; Future  -     Implement Anesthesia Orders Day of Procedure; Standing  -     Obtain Informed Consent; Standing  -     lactated ringers infusion  -     Case Request    3. Hepatic encephalopathy (CMS/HCC)    4. Thrombocytopenia (CMS/HCC)    5. Other ascites    6. Pancreatic lesion  -     MRI abdomen w wo contrast mrcp; Future    7. Intellectual disability        I have discussed the above plan with the patient.  They verbalize understanding and are in agreement with the plan.  They have been advised to contact the office for any questions, concerns, or changes related to their health.    Dictated utilizing Dragon dictation

## 2021-09-01 NOTE — TELEPHONE ENCOUNTER
Caller: URSULAVALERIE    Relationship: Emergency Contact    Best call back number: [phone]    Who are you requesting to speak with (clinical staff, provider,  specific staff member): REQUESTING TO SPEAK TO DR. GARCIA    What was the call regarding: THEY WANT TO SPEAK TO PROVIDER REGARDING LAST VISIT WITH DR. CABRERA (ENDOCRINOLOGIST).     THEY ARE SETTING UP ANOTHER UPPER SCOPE. ALSO GOING TO DO AN MRI. PLEASE CALL AND ADVISE.     Do you require a callback: YES

## 2021-09-01 NOTE — TELEPHONE ENCOUNTER
I just spoke to her sister June and she was tearful. They told her at the doctor yesterday that her liver was terminal and to enjoy life while she can. And that she was not a good candidate for a liver transplant ! I asked why and she said she guesses it was because of her age. Her sister was calling in to ask about how long does  have to live ? She was in tears before we hung up and I told her that I would send over this message to you and call her with your response and if you wanted to maybe see her at a visit to discuss. She said they told her sister that yesterday, but she didn't comprehend to well. And ' main concern is to take care of her boyfriend and not her self. So sad. Please advise..

## 2021-09-02 NOTE — TELEPHONE ENCOUNTER
----- Message from Julian Cesar sent at 9/2/2021  1:18 PM EDT -----  Regarding: Prescription Refill  Contact: [phone]  Alessandra with [facility] requesting refill for Xifaxan 550 MG tablet.  Thank You

## 2021-09-02 NOTE — TELEPHONE ENCOUNTER
See xifaxan rx of 8/20/21 for xifaxan 550 mg 1 tab by mouth every 12 hours, #54, R11.     Call to [facility] and spoke with Alessandra.  States above rx not received.  Question to Alessandra if needs to be #54 for insurance reasons, or if can be #60.  He states #60 ok.  Advise fill per sig, #60, R11.  R&V.

## 2021-09-03 NOTE — TELEPHONE ENCOUNTER
I called and spoke to Shawna, patient's sister on [employer].  Discussed disease process of end-stage liver disease and her poor prognosis.  She expressed understanding and just wants her sister to be able to enjoy the rest of the days that she has.

## 2021-09-13 NOTE — TELEPHONE ENCOUNTER
PHARMACY IS CALLING TO SEE IF THE PRESCRIPTION THEY RECEIVED IS CORRECT.     THEY RECEIVED A PRESCRIPTION FOR lactulose (Enulose) 10 GM/15ML solution solution     ONCE  A DAY AND THEY SAID THE PATIENT USUALLY TAKES IT TWICE A DAY AND WANTED TO ENSURE THIS WAS CORRECT.     CALL: [phone]

## 2021-09-13 NOTE — TELEPHONE ENCOUNTER
I just sent a refill into [facility].  On the refill it states the instructions as twice daily.

## 2021-09-23 NOTE — TELEPHONE ENCOUNTER
Caller: ALBARO IBARRA    Relationship: Emergency Contact      Medication requested (name and dosage): pantoprazole (PROTONIX) 40 MG EC tablet    Pharmacy where request should be sent: [facility]  [address] [place] [address] [phone]    Additional details provided by patient: THIS MAY NEED A PRIOR AUTHORIZATION BEFORE IT IS REFILLED.     Best call back number:     Does the patient have less than a 3 day supply:  [x] Yes  [] No    Julian Dumont Rep   09/23/21 11:43 EDT

## 2021-09-29 NOTE — TELEPHONE ENCOUNTER
Call to [facility] and spoke with Shiv.  States Xifaxan PA approved for only 14 days.     See other note of today re: PA.  Message to Alla CLIFFORD re: xifaxan pa for hepatic encephalopathy.

## 2021-09-29 NOTE — TELEPHONE ENCOUNTER
----- Message from Julian Simpson sent at 9/29/2021 12:11 PM EDT -----  Regarding: xifaxin  Contact: [phone]  Shiv from [facility] said the Xifaxin was only approved for 14 days. So he will need a new prescription.

## 2021-09-30 NOTE — TELEPHONE ENCOUNTER
Call to [facility] and spoke with Jessica.  Advise new PA complete for 2x/day, #60/30.  Verb understanding.

## 2021-09-30 NOTE — TELEPHONE ENCOUNTER
Prior authorization was resubmitted and approved for correct dosing and diagnosis. Awaiting approval letter.

## 2021-10-28 NOTE — PROGRESS NOTES
The MRI shows the cirrhotic liver with some benign nodules, ascites.  Pancreatic cysts without dilation of the pancreatic duct.  The largest is 1.8 cm.We will plan to repeat the MRI in 1 year    Keep scheduled EGD appointmentKeep follow-up in December

## 2021-10-29 NOTE — TELEPHONE ENCOUNTER
----- Message from Bibiana Smith MD sent at 10/28/2021  2:40 PM EDT -----  The MRI shows the cirrhotic liver with some benign nodules, ascites.  Pancreatic cysts without dilation of the pancreatic duct.  The largest is 1.8 cm.We will plan to repeat the MRI in 1 year    Keep scheduled EGD appointmentKeep follow-up in December

## 2021-10-29 NOTE — TELEPHONE ENCOUNTER
Called pt and spoke with pt's sister who is her caregiver. Advised of Dr Smith note. She verb understanding.

## 2021-11-22 NOTE — TELEPHONE ENCOUNTER
----- Message from Julian Fay Rep sent at 11/22/2021  8:13 AM EST -----  Regarding: Order  Pt sister(June)[phone] is wanting an order sent for covid test for pt procedure. Lives outside of [place].

## 2021-11-22 NOTE — TELEPHONE ENCOUNTER
Verify with covid preop testing that pt may have rapid test day of procedure.      Call to June (see hipaa).  Advise of above.  Verb understanding.     Order placed - message to Dr Smith.

## 2021-12-08 NOTE — TELEPHONE ENCOUNTER
Pt sister Shawna said they never received paperwork to tell her what medication she needs to stop and also wants to make sure that she can get her covid test tomorrow. Please call her back at  [phone].

## 2022-01-01 ENCOUNTER — TELEPHONE (OUTPATIENT)
Dept: FAMILY MEDICINE CLINIC | Facility: CLINIC | Age: 57
End: 2022-01-01

## 2022-01-05 NOTE — TELEPHONE ENCOUNTER
Please let the patient's sister know that I would be happy to help in any way that I can but, unfortunately I am not a gastroenterologist like Dr. Smith.  For that reason I am not able to take over the care of her liver.  She must continue seeing Dr. Smith

## 2022-01-05 NOTE — TELEPHONE ENCOUNTER
PTS SISTER CALLED AND IS WORRIED ABOUT PT BECAUSE SHE IS NOT KEEPING HER APPTS.  SHE IS WANTING TO KNOW IF YOU TOOK OVER HER CARE FOR HER LIVER HOW WOULD THAT WORK, BECAUSE PT IS NOT DOING WHAT DR FLOR WANTS HER TO DO.  SHE IS ALSO STATING THAT THEY WANTED HER TO GO TO REHAB IN A NURSING HOME BUT SHE WOULD NOT GO.  SHE IS STATING THAT THEY TOOK 7 LITERS OF FLUID OFF OF HER WHEN SHE WAS IN THERE AND HER AMMONIA LEVELS WERE OVER 300.  HER MAIN QUESTION IS WILL YOU TAKE OVER THE CARE FOR HER LIVER BECAUSE SHE IS NOT DOING WHAT DR FLOR ASK HER TO DO.

## 2024-01-31 NOTE — OUTREACH NOTE
Medical Week 2 Survey      Responses   [facility] patient discharged from?  [place]   COVID-19 Test Status  Negative   Does the patient have one of the following disease processes/diagnoses(primary or secondary)?  Other   Week 2 attempt successful?  No   Unsuccessful attempts  Attempt 1          Celeste Ruiz RN   weight-bearing as tolerated

## (undated) DEVICE — FRCP BX RADJAW4 NDL 2.8 240CM LG OG BX40

## (undated) DEVICE — ADAPT CLN BIOGUARD AIR/H2O DISP

## (undated) DEVICE — TUBING, SUCTION, 1/4" X 10', STRAIGHT: Brand: MEDLINE

## (undated) DEVICE — MSK ENDO PORT O2 POM ELITE CURAPLEX A/

## (undated) DEVICE — BITEBLOCK OMNI BLOC

## (undated) DEVICE — SENSR O2 OXIMAX FNGR A/ 18IN NONSTR

## (undated) DEVICE — KT ORCA ORCAPOD DISP STRL

## (undated) DEVICE — THE TORRENT IRRIGATION SCOPE CONNECTOR IS USED WITH THE TORRENT IRRIGATION TUBING TO PROVIDE IRRIGATION FLUIDS SUCH AS STERILE WATER DURING GASTROINTESTINAL ENDOSCOPIC PROCEDURES WHEN USED IN CONJUNCTION WITH AN IRRIGATION PUMP (OR ELECTROSURGICAL UNIT).: Brand: TORRENT

## (undated) DEVICE — CANN O2 ETCO2 FITS ALL CONN CO2 SMPL A/ 7IN DISP LF

## (undated) DEVICE — LN SMPL CO2 SHTRM SD STREAM W/M LUER